# Patient Record
Sex: FEMALE | Race: BLACK OR AFRICAN AMERICAN | Employment: UNEMPLOYED | URBAN - METROPOLITAN AREA
[De-identification: names, ages, dates, MRNs, and addresses within clinical notes are randomized per-mention and may not be internally consistent; named-entity substitution may affect disease eponyms.]

---

## 2017-07-26 ENCOUNTER — HOSPITAL ENCOUNTER (EMERGENCY)
Age: 42
Discharge: HOME | End: 2017-07-26
Attending: EMERGENCY MEDICINE
Payer: COMMERCIAL

## 2017-07-26 VITALS
OXYGEN SATURATION: 100 % | HEART RATE: 84 BPM | DIASTOLIC BLOOD PRESSURE: 90 MMHG | SYSTOLIC BLOOD PRESSURE: 133 MMHG | WEIGHT: 186 LBS | RESPIRATION RATE: 19 BRPM | BODY MASS INDEX: 30.99 KG/M2 | HEIGHT: 65 IN | TEMPERATURE: 98.5 F

## 2017-07-26 DIAGNOSIS — R51.9 HEADACHE, UNSPECIFIED HEADACHE TYPE: Primary | ICD-10-CM

## 2017-07-26 PROCEDURE — 99282 EMERGENCY DEPT VISIT SF MDM: CPT

## 2017-07-26 RX ORDER — ACETAMINOPHEN 500 MG
1000 TABLET ORAL
Status: DISCONTINUED | OUTPATIENT
Start: 2017-07-26 | End: 2017-07-26

## 2017-07-26 NOTE — DISCHARGE INSTRUCTIONS

## 2017-07-26 NOTE — ED NOTES
Patient is refusing tylenol at this time. Patient states \"i can take my own tylenol at home that doesn't cost $300\". Patient instructed on use of tylenol. Will continue to monitor.

## 2017-07-26 NOTE — ED PROVIDER NOTES
HPI Comments: 2:53 AM   Tamy Duffy is a 38 y.o. female with hx of HTN and migraines presents to ED C/O stabbing, intermittent, worsening right sided parietal HA onset 22 hours ago, but woke her up PTA, prompting her to come to the ED. Associated sx include \"blotchyness to my vision. \" Denies current HA in ED. States \"it causes me to be confused\". Also c/o left ear pain and right shoulder pain. Denies exertional sx. Pt denies use of OTC for sx because \"I only have 1 kidney. \" Pt denies falls or trauma, prior episode of similar sx, fevers, chills, nausea, vomiting, or any other sxs or complaints. The history is provided by the patient. No  was used. Past Medical History:   Diagnosis Date    Anxiety     Depression     Hypertension     Personal history of kidney cancer        Past Surgical History:   Procedure Laterality Date    HX COLONOSCOPY      HX HYSTERECTOMY      HX NEPHRECTOMY      HX OOPHORECTOMY Right          History reviewed. No pertinent family history. Social History     Social History    Marital status: SINGLE     Spouse name: N/A    Number of children: N/A    Years of education: N/A     Occupational History    Not on file. Social History Main Topics    Smoking status: Never Smoker    Smokeless tobacco: Not on file    Alcohol use Yes    Drug use: No    Sexual activity: Not on file     Other Topics Concern    Not on file     Social History Narrative         ALLERGIES: Shellfish derived    Review of Systems   Constitutional: Negative. Negative for appetite change, chills and fever. HENT: Negative for congestion, sore throat and trouble swallowing. Eyes: Positive for visual disturbance (\"blotchyness\"). Respiratory: Negative. Negative for cough, shortness of breath and wheezing. Cardiovascular: Negative. Negative for chest pain, palpitations and leg swelling. Gastrointestinal: Negative.   Negative for abdominal pain, blood in stool, diarrhea and vomiting. Genitourinary: Negative. Negative for difficulty urinating, dysuria, frequency and vaginal discharge. Musculoskeletal: Negative. Negative for back pain and myalgias. Skin: Negative. Negative for rash and wound. Neurological: Positive for headaches. Negative for dizziness, syncope, speech difficulty, weakness, light-headedness and numbness. Psychiatric/Behavioral: Negative. Negative for hallucinations, self-injury and suicidal ideas. All other systems reviewed and are negative. Vitals:    07/26/17 0243 07/26/17 0248   BP: 133/90    Pulse: 84    Resp: 19    Temp: 98.5 °F (36.9 °C)    SpO2: 100% 100%   Weight: 84.4 kg (186 lb)    Height: 5' 5\" (1.651 m)             Physical Exam   Constitutional: She is oriented to person, place, and time. She appears well-developed and well-nourished. No distress. HENT:   Head: Normocephalic and atraumatic. Mouth/Throat: Oropharynx is clear and moist.   Eyes: Conjunctivae and EOM are normal. Pupils are equal, round, and reactive to light. No photophobia on exam   Neck: Trachea normal and normal range of motion. Neck supple. No meningeal signs   Cardiovascular: Normal rate, regular rhythm, S1 normal and S2 normal.    Pulmonary/Chest: Effort normal. No accessory muscle usage. No respiratory distress. Abdominal: Soft. Normal appearance. She exhibits no distension. There is no tenderness. There is no rigidity, no rebound and no guarding. Musculoskeletal: Normal range of motion. She exhibits no edema or tenderness. Neurological: She is alert and oriented to person, place, and time. She has normal strength. No cranial nerve deficit or sensory deficit. Coordination normal.   Normal finger to nose, no cerebellar signs or ataxia. Normal gait. Skin: Skin is warm and intact. No rash noted. Psychiatric: Her speech is normal and behavior is normal.   Flat affect   Vitals reviewed.        MDM  Number of Diagnoses or Management Options  Headache, unspecified headache type:   Diagnosis management comments: Shant Anna is a 38 y.o. Female coming in with a right sided parietal intermittent HA only lasting a couple of seconds at a time. ?? Aura and documented hx of migraines. Patient has a very strange demeanor and affect, but is well appearing with a normal neuro exam. Patient drove herself to the ED and is currently asymptomatic. Will provide tylenol and refer to PCP for outpatient follow up. No concern for intracranial hemorrhage, SAH or meningitis. Gave return precautions for worsening symptoms and otherwise instructed to call primary doctor in am for outpatient follow up. ED Course       Procedures  Vitals:  Patient Vitals for the past 12 hrs:   Temp Pulse Resp BP SpO2   07/26/17 0248 - - - - 100 %   07/26/17 0243 98.5 °F (36.9 °C) 84 19 133/90 100 %       Medications Ordered:  Medications   acetaminophen (TYLENOL) tablet 1,000 mg (not administered)       Diagnosis:   1. Headache, unspecified headache type        Disposition: discharge    Follow-up Information     Follow up With Details Comments [address] [place] [address]. Pedrito Maier MD Call today follow up with your primary care physician  [address] [facility]      [id number] [facility] EMERGENCY DEPT  As needed, If symptoms worsen [address] [facility]  [phone]          Patient's Medications   Start Taking    No medications on file   Continue Taking    BUTALBITAL-ACETAMINOPHEN-CAFF (FIORICET) -40 MG PER CAPSULE    Take 1-2 Caps by mouth every six (6) hours as needed for Pain. Max Daily Amount: 8 Caps. HYDROCHLOROTHIAZIDE (HYDRODIURIL) 25 MG TABLET    Take 25 mg by mouth daily. LOSARTAN (COZAAR) 100 MG TABLET    Take 100 mg by mouth daily. SIMVASTATIN (ZOCOR) 10 MG TABLET    Take 20 mg by mouth nightly.    These Medications have changed    No medications on file   Stop Taking    No medications on file Naeem 46  [employer], acting as a scribe for and in the presence of Leatha Dave MD      July 26, 2017 at 2:39 AM       Provider Attestation:      I personally performed the services described in the documentation, reviewed the documentation, as recorded by the scribe in my presence, and it accurately and completely records my words and actions.      Leatha Dave MD      Signed by: Naeem Fajardo, July 26, 2017 at 2:39 AM

## 2018-05-17 ENCOUNTER — CLINICAL SUPPORT (OUTPATIENT)
Dept: FAMILY MEDICINE CLINIC | Age: 43
End: 2018-05-17

## 2018-05-17 DIAGNOSIS — E66.9 OBESITY, UNSPECIFIED CLASSIFICATION, UNSPECIFIED OBESITY TYPE, UNSPECIFIED WHETHER SERIOUS COMORBIDITY PRESENT: Primary | ICD-10-CM

## 2018-05-17 NOTE — PROGRESS NOTES
Patient attended a Medically Supervised Weight Loss New Patient Orientation today where we discussed:  - New Direction Very Low Calorie Diet details  - Medical Supervision  - Nutrition education  - Cost of Meal Replacements  - Policies and compliance required for program enrollment. Patients initial consultation with physician is tentatively scheduled for:  Future Appointments  Date Time Provider Frederic Sanders   5/29/2018 2:30 PM Delvin Marx DO [address] [facility] [address]         Patient was concerned if this would be appropriate for her because she only has 1 kidney.

## 2018-05-17 NOTE — Clinical Note
Patient was concerned if this would be appropriate for her because she only has 1 kidney. Please let me know if I need to cancel her consult. Thank you.

## 2018-05-29 ENCOUNTER — OFFICE VISIT (OUTPATIENT)
Dept: FAMILY MEDICINE CLINIC | Age: 43
End: 2018-05-29

## 2018-05-29 VITALS
DIASTOLIC BLOOD PRESSURE: 87 MMHG | RESPIRATION RATE: 16 BRPM | HEIGHT: 65 IN | HEART RATE: 73 BPM | SYSTOLIC BLOOD PRESSURE: 118 MMHG | BODY MASS INDEX: 31.97 KG/M2 | WEIGHT: 191.9 LBS

## 2018-05-29 DIAGNOSIS — E66.9 OBESITY (BMI 35.0-39.9 WITHOUT COMORBIDITY): Primary | ICD-10-CM

## 2018-05-29 DIAGNOSIS — E78.5 HYPERLIPIDEMIA, UNSPECIFIED HYPERLIPIDEMIA TYPE: ICD-10-CM

## 2018-05-29 DIAGNOSIS — E55.9 VITAMIN D DEFICIENCY: ICD-10-CM

## 2018-05-29 RX ORDER — ZINC GLUCONATE 10 MG
400 LOZENGE ORAL DAILY
COMMUNITY
End: 2022-01-28

## 2018-05-29 RX ORDER — BENZONATATE 100 MG/1
CAPSULE ORAL
COMMUNITY
Start: 2018-05-23 | End: 2021-12-22

## 2018-05-29 RX ORDER — CYANOCOBALAMIN (VITAMIN B-12) 1000 MCG
1 TABLET, EXTENDED RELEASE ORAL
COMMUNITY
End: 2018-08-02 | Stop reason: ALTCHOICE

## 2018-05-29 RX ORDER — CETIRIZINE HCL 10 MG
10 TABLET ORAL
COMMUNITY
End: 2022-04-12

## 2018-05-29 RX ORDER — ALBUTEROL SULFATE 90 UG/1
1-2 AEROSOL, METERED RESPIRATORY (INHALATION)
COMMUNITY
Start: 2018-05-23

## 2018-05-29 RX ORDER — ASCORBIC ACID 500 MG
500 TABLET ORAL DAILY
COMMUNITY
End: 2021-12-02

## 2018-05-29 RX ORDER — BISMUTH SUBSALICYLATE 262 MG
1 TABLET,CHEWABLE ORAL DAILY
COMMUNITY

## 2018-05-29 NOTE — PATIENT INSTRUCTIONS
Homework for [employer]        Exercise    Exercise daily   - Start slow, gradually increase your time by around 10 to 20 % a week    - To prevent injury, take a recovery week every 4 weeks (reduce your exercise time and intensity by 1/2 during this time)    - Your goal is to work up to 150 min a week; hard enough that you can't whistle or sing. It may take 6 months to work up to this. - Call the Health  at [facility] labs for exercise ideas ([phone])          Common Side Effects   (In order of how common)    -Fatigue  -Hunger  -Constipation  -Low sodium  -Hair Loss      1. Fatigue  Everyone goes through this stage  It's normal  It lasts 10 - 14 days  It goes away  It's your body adjusting to the Ketogenic diet and it's normal       2. Hunger  More common in the first few days  After your body adjusts to the Ketogenic diet it will go away       3. Constipation   -Drink at least 64 oz of non-calorie fluid a day  -Add fiber (at least 20 grams a day)       1. Get the New Direction products with fiber added     2. Use SUGAR-FREE products: Fiber One products, Benefiber or Metamucil    If you're prone to constipation: Take a Stool Softener every day.     (eg - Dulcolax Stool Softener 100 mg or Miralax)    If you get constipated:     1. Start with a Stool Softener (produces a bowel movement in 72 hr):   Examples:      Miralax 1 capful twice a day (It's OK to go up to 3 caps for a few days)      Dulcolax Stool Softener 100 mg       2. Next: If you still have constipation after 2 or 3 days, add a Stimulant          Laxative (this will produce a bowel movement in 6 - 12 hr):   Examples:      Exlax (1 small square) for up to 4 days      Dulcolax stimulant laxative (8.25 mg)       Magnesium citrate pill 500 mg 3 - 4 pills a day       3. Or you can go straight to a combination Stool Softener / Stimulant at night. If  you need, you can add a morning dose.    Examples:      Pericolace 50 mg / 8.25 mg      Senokot-S  50 mg / 8.25 mg       4. Finally If You're Really locked up, get Liquid Magnesium Citrate (take  according to the directions)      4. Low blood levels of sodium  -Not very common, especially if you're not taking a diuretic (fluid pill)  If you develop a headache, feel fatigued, light-headed or dizzy    Drink 1/2 cup of bouillon broth up to twice a day until you feel normal      5. Hair loss  -This is fairly uncommon; you may see more than a usual amount of hair in your brush or the shower drain  This can happen with any physical stress (surgery, trauma or calorie restriction) or psychological stress. Although is it very concerning, it is often temporary and will resolve within 3 months. Some people notice a benefit from taking a daily dose of Biotin 2,500 mcg and increasing their Fish Oil intake. Books to [address] [place] [address]    1. Change Anything: The [employer] of Personal Success  by Dandy Graham, Ruba Rice, and Gian Alfaro    2. Mindless Eating  by Lou Freeman    3. Perfectly Yourself  by Anderson Tellez    4. Me, Myself and I - 28 Days to Creative Self-Love  by Thierno Dunn version only    Note: Reading these books is a small but significant investment in yourself. Merely following the diet will reliably result in weight loss. However, revising how you respond to stressful situations, how you relate to food and, your commitment to self-care (adequate sleep, exercise & daily reflection) is the ONLY way to protect your weight loss and free yourself from your patterns that lead to weight gain. Compliance    We do not expect perfection but we do ask for your persistence. Your success is directly corolleated to your willingness to do the work of growing yourself. You will hit plateaus in your weight loss. You will run into situations that test your will power. You will encounter times when you feel frustrated.     It's OK if you slip up from time to time. However, how your respond to your slip ups and, the adjustments you make to prevent future slip ups will determine you long-term success. If you find yourself temporarily slipping in the program, it's OK. We will gently nudge you forward and encourage you to do the work of identifying and moving beyond your stuck areas. However, if you find yourself wavering in the program for a prolonged time (more than 3 or 4 months) we will ask that you take a break from the program and work with a counselor to do some focused work in order to identify and break the patterns that are holding you back. Once you and your counselor is convinced that you have moved past those patterns and want to give the program another chance, we will gladly work with you to determine if you're ready to start back in the program.    When returning after a break, if it's been less than 3 months, you do not need to repeat an orientation, labs or an EKG. If it's over been 3 months you will required to repeat an orientation and, if greater than 6 months, you will be required to repeat an orientation, labs and an EKG. Additional Tips    - Consume your 4 daily meal replacements equally spaced over the    day   No more than 6 hours between each meal   Breakfast is especially important    - Get the support of family and friends    - Snack-proof your house    - Have strategies for social situations, meetings that run over or vacation      - When you fall off the plan it's no big deal; just start right back. Turn those days into examples of what to change or avoid next time. Long-term Healthy Weight / Body Fat  Different ways to determining your ideal weight:  1. Keep your waist to less than 1/2 of your height   2. Keep your % body fat to under 30% for female and under 20% if male  3. Keep your BMI around 25          Supplements      1.   Vitacost Synergy Basic Multi-Vitamin (Their In-House Brand)      Take 1 capsule twice a day        Found ONLY at [facility], NOT at [facility], [facility], [facility], the Vitamin Shoppe, etc      SKU #: [id number]       $16.49   / 1 month supply      www. Heap  [phone]       2. N-Acetyl Cysteine (NAC) -- 600 mg       1 pill once a day       Found at many stores but [address] has a good price:       Item #: [id number]  $9.99 / 120 pills (4 month supply)       www. Heap  [phone]      3. Turmeric with Black Pepper Extract (or Bioperine) 500 mg      1 pill 1 - 2 times a day (more is joint pain or increased inflammation)      Found at many stores but Vitacost has a good price:      SKU #: [id number]  $13.64 / 60 pills      www[employer]  [phone]       4. Probiotic: 15-35 (35 billion CFU)         1 capsule twice a day for 2 weeks, then 3 days a week       SKU #: [id number]  $27.99 / 120 capsules       www[employer]  [phone]       5. Fish Oil      Take 1 capsule daily                             FYI:   Typical fish oil per pill =  mg and  mg  Krill oil per pill = EPA 50 - 70 mg and DHA 27 - 250 mg    6. Other things to help with will power      -[address] you with self acceptance           Put 4 drops in 10 oz water or a meal replacement 2 - 4 times a day         Around $15 a vial which lasts ~3 months         www. Heap  [phone]                Drink Filtered Water    My favorite water filter (adds minerals to your water and cheaper than Jennifer)    pH REFRESH Alkaline Water Pitcher Ionizer With 2 Long-Life Filters - Alkaline  Machine - Ionized Water Alkalizer Filter, 84oz, 2.5L (2017 Model) (White)   Sold on [employer] w/ Free Shipping        ^^^^^^^^^^^^^^^^^^^^^^^^^^^^^^^^^^^^^^^^^^^^^^^^^^^^^^^^^^^^^^^^^^^^^^^^^^^^^^^^^^^^^^^^^^^^^^^      Carbohydrates     If you do not metabolize carbohydrates well, you will lose weight and keep the weigh off by keeping your carbohydrate intake low.  How do you know if you do not metabolize carbs well? If your Triglycerides, sdLCL-C and Insulin Resistance are elevated, then you will do well to follow a low carb diet. Fun Facts About Carbs    - The Typical American Diet = 450 grams a day    - The Reducing Phase of the VLCD = 40 grams a day    - Target for weight loss maintenance:   - Eat no more than 30 grams per meal   - Eat no more than 10 grams per snack (mid-morning and mid-afternoon snack)        Resources for finding out where carbs hide in our foods:  1. Our Registered Dietitians    2. Www. Atkins. com/tools    3. Read food labels    4. Books       - The Calorie Wilhelminia Bless       - The [place] System Diet for a New You       - Radha Olsen's NEW Carb and Calorie 4th Edition (my favorite)    5. Smart phone and Internet-based apps       - Circlefive        - Carb Manager      If you want to get a better picture of your cardiac risk, consider getting a coronary calcium score:  Call [phone] to schedule one. Body Mass Index: Care Instructions  Your Care Instructions    Body mass index (BMI) can help you see if your weight is raising your risk for health problems. It uses a formula to compare how much you weigh with how tall you are. · A BMI lower than 18.5 is considered underweight. · A BMI between 18.5 and 24.9 is considered healthy. · A BMI between 25 and 29.9 is considered overweight. A BMI of 30 or higher is considered obese. If your BMI is in the normal range, it means that you have a lower risk for weight-related health problems. If your BMI is in the overweight or obese range, you may be at increased risk for weight-related health problems, such as high blood pressure, heart disease, stroke, arthritis or joint pain, and diabetes.  If your BMI is in the underweight range, you may be at increased risk for health problems such as fatigue, lower protection (immunity) against illness, muscle loss, bone loss, hair loss, and hormone problems. BMI is just one measure of your risk for weight-related health problems. You may be at higher risk for health problems if you are not active, you eat an unhealthy diet, or you drink too much alcohol or use tobacco products. Follow-up care is a key part of your treatment and safety. Be sure to make and go to all appointments, and call your doctor if you are having problems. It's also a good idea to know your test results and keep a list of the medicines you take. How can you care for yourself at home? · Practice healthy eating habits. This includes eating plenty of fruits, vegetables, whole grains, lean protein, and low-fat dairy. · If your doctor recommends it, get more exercise. Walking is a good choice. Bit by bit, increase the amount you walk every day. Try for at least 30 minutes on most days of the week. · Do not smoke. Smoking can increase your risk for health problems. If you need help quitting, talk to your doctor about stop-smoking programs and medicines. These can increase your chances of quitting for good. · Limit alcohol to 2 drinks a day for men and 1 drink a day for women. Too much alcohol can cause health problems. If you have a BMI higher than 25  · Your doctor may do other tests to check your risk for weight-related health problems. This may include measuring the distance around your waist. A waist measurement of more than 40 inches in men or 35 inches in women can increase the risk of weight-related health problems. · Talk with your doctor about steps you can take to stay healthy or improve your health. You may need to make lifestyle changes to lose weight and stay healthy, such as changing your diet and getting regular exercise. If you have a BMI lower than 18.5  · Your doctor may do other tests to check your risk for health problems. · Talk with your doctor about steps you can take to stay healthy or improve your health.  You may need to make lifestyle changes to gain or maintain weight and stay healthy, such as getting more healthy foods in your diet and doing exercises to build muscle. Where can you learn more? Go to http://elvie-yi.info/. Enter S176 in the search box to learn more about \"Body Mass Index: Care Instructions. \"  Current as of: October 13, 2016  Content Version: 11.4  © 0998-8118 [employer]. Care instructions adapted under license by [employer] (which disclaims liability or warranty for this information). If you have questions about a medical condition or this instruction, always ask your healthcare professional. [place] [address] any warranty or liability for your use of this information.

## 2018-05-29 NOTE — PROGRESS NOTES
[facility] Weight Loss Program Progress Note: Initial Physician Visit     Chano Purdy is a 37 y.o. female who is here for medical screening for entering the [facility] Program.     CC:  Obesity      Weight History  I personally reviewed the Medical Screening Nara Fothergill completed by patient and scanned into media section of chart. It includes duration of their obesity, maximum weight, goal weight and weight gain time line (timing), all of which give the context of their obesity AND a Family History of their obesity. Is their Weight Loss Goal entered in to Comments? 145      Weight loss History  I personally reviewed the Medical Screening Nara Fothergill completed by the patient and scanned into media section of chart. It includes number of weight loss attempts, the weight loss program that patients was most successful using, and if they have any hx of anorectic medication use, including OTC supplements. This captures modifying factors. Significant Medical History  Past Medical History:   Diagnosis Date    Anxiety     Chronic kidney disease     Stage II    Depression     Hypertension     Personal history of kidney cancer        I personally reviewed the Medical Screening Nara Fothergill completed by the patient and scanned into media section of chart. This allows me to assess associated symptoms that are significant in the assessment of the patient's obesity and the patient's Past Medical History. Significant Psychosocial History   Has a doctor every diagnosed with Binge Eating Disorder, Bulemia or Anorexia? : no     Compliance  Upcoming Travel? no    Social History  Social History   Substance Use Topics    Smoking status: Never Smoker    Smokeless tobacco: Never Used    Alcohol use Yes       Exercise  I personally reviewed the Medical Screening Nara Fothergill completed by the patient and scanned into media section of chart.       Review of Systems  See HPI        Objective  Visit Vitals    /87    Pulse 73    Resp 16    Ht 5' 5\" (1.651 m)    Wt 191 lb 14.4 oz (87 kg)    BMI 31.93 kg/m2         Weight Metrics 5/29/2018 5/29/2018 7/26/2017 12/9/2016 11/11/2016 2/11/2016 10/20/2015   Weight - 191 lb 14.4 oz 186 lb 185 lb 187 lb 190 lb 190 lb   Waist Measure Inches 34 - - - - - -   Body Fat % 39.7 - - - - - -   BMI - 31.93 kg/m2 30.95 kg/m2 36.13 kg/m2 36.52 kg/m2 37.11 kg/m2 37.11 kg/m2       Labs: See HDL labs scanned into Media section       Physical Exam    Vital Signs Reviewed  Weight Management Metrics Reviewed    Appearance: Obese  HEENT:  Scleral icterus?  no  Neck:  Thyromegaly or nodules? no  Mouth: Large tongue no  Heart:  RRR  Lungs:  LCTA  Abdomen:     Hepatomegaly? no   Striae present? no  Skin:    Acne?  no   Hirsutism? no   Skin tags? no   Acanthosis Nigricans?  no  Ext:  Edema? no      Assessment & Plan  Encounter Diagnoses   Name Primary?  Obesity (BMI 35.0-39.9 without comorbidity) Yes    Hyperlipidemia, unspecified hyperlipidemia type     Vitamin D deficiency        1. Labs reviewed w/ patient    2. EKG reviewed w/ patient:  Personally reviewed by me  -Sinus  Rhythm   -Left atrial enlargement.   -Negative precordial T-waves  -Probably normal -consider anteroseptal ischemia - asymptomatic  QTc = 411 sec (Upper limit is 440 for males & 460 for females)    3. Medication changes include: Stop HCTZ & stop Fe    4. Based on his history, labs and EKG, Pete Quiñonez is now enrolled for the [facility] Weight Loss Program.     5.  Individualized Patient Plan is as follows:   Diet Regimen: 4 Meal Replacements daily. Weigh in: weekly at [address]   BP f/up plan: weekly at [address]       25 min of the 45 minutes face to face time with Jennifer Bautista consisted of counseling & coordinating and/or discussing treatment plans in reference to her above mentioned diagnoses.

## 2018-05-29 NOTE — MR AVS SNAPSHOT
Pam Diaz [place] [address] [facility] [address]. [address] Located within [facility] 83 [id number] 
[phone] Patient: Krysten Vaughan MRN: [id number] UVB:[birth year]52 Visit Information Date & Time Provider Department Dept. Phone Encounter #  
 5/29/2018  2:30 PM Garret AriadneEllen landers 13 [id number] Follow-up Instructions Return in about 4 weeks (around 6/26/2018) for MSWL & Lab Draw. Upcoming Health Maintenance Date Due DTaP/Tdap/Td series (1 - Tdap) 2/14/1994 PAP AKA CERVICAL CYTOLOGY 2/14/1994 Influenza Age 5 to Adult 8/1/2018 Allergies as of 5/29/2018  Review Complete On: 5/29/2018 By: Sascha Mcgrath LPN Severity Noted Reaction Type Reactions Shellfish Derived  08/27/2014    Angioedema Current Immunizations  Never Reviewed No immunizations on file. Not reviewed this visit You Were Diagnosed With   
  
 Codes Comments Obesity (BMI 35.0-39.9 without comorbidity)    -  Primary ICD-10-CM: W60.1 ICD-9-CM: 278.00 Hyperlipidemia, unspecified hyperlipidemia type     ICD-10-CM: E78.5 ICD-9-CM: 272.4 Vitamin D deficiency     ICD-10-CM: E55.9 ICD-9-CM: 268.9 Vitals BP Pulse Resp Height(growth percentile) Weight(growth percentile) BMI  
 118/87 73 16 5' 5\" (1.651 m) 191 lb 14.4 oz (87 kg) 31.93 kg/m2 OB Status Smoking Status Hysterectomy Never Smoker Vitals History BMI and BSA Data Body Mass Index Body Surface Area  
 31.93 kg/m 2 2 m 2 Your Updated Medication List  
  
   
This list is accurate as of 5/29/18  2:49 PM.  Always use your most recent med list.  
  
  
  
  
 albuterol 90 mcg/actuation inhaler Commonly known as:  PROVENTIL HFA, VENTOLIN HFA, PROAIR HFA  
1-2 Puffs. benzonatate 100 mg capsule Commonly known as:  TESSALON Take 1 capsule 3 times daily as needed for coughing, swallow capsules whole. butalbital-acetaminophen-caff -40 mg per capsule Commonly known as:  [employer] Take 1-2 Caps by mouth every six (6) hours as needed for Pain. Max Daily Amount: 8 Caps. cetirizine 10 mg tablet Commonly known as:  ZYRTEC 10 mg.  
  
 COQ10  PO Take  by mouth. CULTURELLE 15 billion cell capsule Generic drug:  lactobacillus rhamnosus gg 15 billion cell Take 1 Cap by mouth daily. EMERGEN-C 1,000 mg Pwep Generic drug:  Ascorbic Acid-Multivits-Min Take  by mouth. FIBER (PSYLLIUM HUSK) PO Take  by mouth. fish oil-omega-3 fatty acids 340-1,000 mg capsule Take 1 Cap by mouth daily. hydroCHLOROthiazide 25 mg tablet Commonly known as:  HYDRODIURIL Take 25 mg by mouth daily. iron, carbonyl 45 mg Tab Commonly known as:  Nevelyn Cohen Take 1 Tab by mouth every seven (7) days. losartan 100 mg tablet Commonly known as:  COZAAR Take 100 mg by mouth daily. magnesium 250 mg Tab Take  by mouth.  
  
 multivitamin tablet Commonly known as:  ONE A DAY Take 1 Tab by mouth daily. [address] Take  by mouth.  
  
 potassium 99 mg tablet Take 99 mg by mouth two (2) times a day. simvastatin 10 mg tablet Commonly known as:  ZOCOR Take 20 mg by mouth nightly. VITAMIN B-6 PO Take  by mouth. VITAMIN C 500 mg tablet Generic drug:  ascorbic acid (vitamin C) Take  by mouth. zinc 50 mg Tab tablet Take  by mouth daily. We Performed the Following AMB POC EKG ROUTINE W/ 12 LEADS, INTER & REP [81438 CPT(R)] Follow-up Instructions Return in about 4 weeks (around 6/26/2018) for MS[facility] & Lab Draw. Patient Instructions Homework for [employer] 
 
 
 
Exercise Exercise daily  
- Start slow, gradually increase your time by around 10 to 20 % a week - To prevent injury, take a recovery week every 4 weeks (reduce your exercise time and intensity by 1/2 during this time) - Your goal is to work up to 150 min a week; hard enough that you can't whistle or sing. It may take 6 months to work up to this. - Call the Health  at [facility] labs for exercise ideas ([phone]) Common Side Effects (In order of how common) -Fatigue 
-Hunger 
-Constipation 
-Low sodium 
-Hair Loss 1. Fatigue Everyone goes through this stage It's normal 
It lasts 10 - 14 days It goes away It's your body adjusting to the Ketogenic diet and it's normal  
 
 
2. Hunger More common in the first few days After your body adjusts to the Ketogenic diet it will go away 3. Constipation  
-Drink at least 64 oz of non-calorie fluid a day 
-Add fiber (at least 20 grams a day) 1. Get the New Direction products with fiber added 2. Use SUGAR-FREE products: Fiber One products, Benefiber or Metamucil If you're prone to constipation: Take a Stool Softener every day. 
   (eg - Dulcolax Stool Softener 100 mg or Miralax) If you get constipated: 1. Start with a Stool Softener (produces a bowel movement in 72 hr): 
 Examples: 
    Miralax 1 capful twice a day (It's OK to go up to 3 caps for a few days) Dulcolax Stool Softener 100 mg 2. Next: If you still have constipation after 2 or 3 days, add a Stimulant Laxative (this will produce a bowel movement in 6 - 12 hr): 
 Examples: 
    Exlax (1 small square) for up to 4 days Dulcolax stimulant laxative (8.25 mg) Magnesium citrate pill 500 mg 3 - 4 pills a day 3. Or you can go straight to a combination Stool Softener / Stimulant at night. If  you need, you can add a morning dose. Examples: 
    Pericolace 50 mg / 8.25 mg Senokot-S  50 mg / 8.25 mg 
 
   4. Finally If You're Really locked up, get Liquid Magnesium Citrate (take  according to the directions) 4. Low blood levels of sodium -Not very common, especially if you're not taking a diuretic (fluid pill) If you develop a headache, feel fatigued, light-headed or dizzy Drink 1/2 cup of bouillon broth up to twice a day until you feel normal 
 
 
5. Hair loss 
-This is fairly uncommon; you may see more than a usual amount of hair in your brush or the shower drain This can happen with any physical stress (surgery, trauma or calorie restriction) or psychological stress. Although is it very concerning, it is often temporary and will resolve within 3 months. Some people notice a benefit from taking a daily dose of Biotin 2,500 mcg and increasing their Fish Oil intake. Books to [address] [place] [address] 1. Change Anything: The [employer] of Personal Success 
by Daphnie Frazier, Bert Desir, and Sidra Donald 2. Mindless Eating 
by Ava Olivares 3. Perfectly Yourself 
by Baljinder Cordero 4. Me, Myself and I - 28 Days to Creative Self-Love 
by Loni Roldan version only Note: Reading these books is a small but significant investment in yourself. Merely following the diet will reliably result in weight loss. However, revising how you respond to stressful situations, how you relate to food and, your commitment to self-care (adequate sleep, exercise & daily reflection) is the ONLY way to protect your weight loss and free yourself from your patterns that lead to weight gain. Compliance We do not expect perfection but we do ask for your persistence. Your success is directly corolleated to your willingness to do the work of growing yourself. You will hit plateaus in your weight loss. You will run into situations that test your will power. You will encounter times when you feel frustrated. It's OK if you slip up from time to time. However, how your respond to your slip ups and, the adjustments you make to prevent future slip ups will determine you long-term success. If you find yourself temporarily slipping in the program, it's OK. We will gently nudge you forward and encourage you to do the work of identifying and moving beyond your stuck areas. However, if you find yourself wavering in the program for a prolonged time (more than 3 or 4 months) we will ask that you take a break from the program and work with a counselor to do some focused work in order to identify and break the patterns that are holding you back. Once you and your counselor is convinced that you have moved past those patterns and want to give the program another chance, we will gladly work with you to determine if you're ready to start back in the program. 
 
When returning after a break, if it's been less than 3 months, you do not need to repeat an orientation, labs or an EKG. If it's over been 3 months you will required to repeat an orientation and, if greater than 6 months, you will be required to repeat an orientation, labs and an EKG. Additional Tips 
 
- Consume your 4 daily meal replacements equally spaced over the    day No more than 6 hours between each meal 
 Breakfast is especially important - Get the support of family and friends 
 
- Snack-proof your house - Have strategies for social situations, meetings that run over or vacation - When you fall off the plan it's no big deal; just start right back. Turn those days into examples of what to change or avoid next time. Long-term Healthy Weight / Body Fat Different ways to determining your ideal weight: 
1. Keep your waist to less than 1/2 of your height 2. Keep your % body fat to under 30% for female and under 20% if male 3. Keep your BMI around 25 Supplements 1. Vitacost Synergy Basic Multi-Vitamin (Their In-House Brand) Take 1 capsule twice a day Found ONLY at [facility], NOT at [facility], [address] [place] [address], the [address] [place] [address] kelly Hernandez 
    [facility] #: [id number] $16.49   / 1 month supply 
    www. Zannel  [phone]  
 
 
2. N-Acetyl Cysteine (NAC) -- 600 mg 
     1 pill once a day Found at many stores but [address] has a good price: 
     Item #: [id number]  $9.99 / 120 pills (4 month supply) 
     www. Zannel  [phone] 3. Turmeric with Black Pepper Extract (or Bioperine) 500 mg 
    1 pill 1 - 2 times a day (more is joint pain or increased inflammation) Found at many stores but [address] has a good price: 
    SKU #: [id number]  $13.64 / 61 pills 
    www. Zannel  [phone] 4. Probiotic: 15-35 (35 billion CFU) 1 capsule twice a day for 2 weeks, then 3 days a week SKU #: [id number]  $27.99 / 120 capsules 
     www. Zannel  [phone] 5. Fish Oil Take 1 capsule daily FYI:  
Typical fish oil per pill =  mg and  mg 
Krill oil per pill = EPA 50 - 70 mg and DHA 27 - 250 mg 
 
6. Other things to help with will power -Radha Ke Remed[employer] Crab Apple - Helps you with self acceptance Put 4 drops in 10 oz water or a meal replacement 2 - 4 times a day Around $15 a vial which lasts ~3 months 
       www. Zannel  [phone] Drink Filtered Water My favorite water filter (adds minerals to your water and cheaper than Jennifer) pH REFRESH Alkaline Water Pitcher Ionizer With 2 Long-Life Filters - Alkaline  Machine - Ionized Water Alkalizer Filter, 84oz, 2.5L (2017 Model) Jackson Lauraside) Sold on [employer] w/ Free Shipping 
 
 
 
^^^^^^^^^^^^^^^^^^^^^^^^^^^^^^^^^^^^^^^^^^^^^^^^^^^^^^^^^^^^^^^^^^^^^^^^^^^^^^^^^^^^^^^^^^^^^^^ Carbohydrates If you do not metabolize carbohydrates well, you will lose weight and keep the weigh off by keeping your carbohydrate intake low. How do you know if you do not metabolize carbs well?   If your Triglycerides, sdLCL-C and Insulin Resistance are elevated, then you will do well to follow a low carb diet. Fun Facts About Carbs - The Typical American Diet = 450 grams a day - The Reducing Phase of the VLCD = 40 grams a day - Target for weight loss maintenance: 
 - Eat no more than 30 grams per meal 
 - Eat no more than 10 grams per snack (mid-morning and mid-afternoon snack) Resources for finding out where carbs hide in our foods: 
1. Our Registered Dietitians 2. Www. Atkins. com/tools 3. Read food labels 4. Books - The Calorie Annamarie Dukesile - The New Atkins Diet for a New You 
     - Radha Joneslloyd's NEW Carb and Calorie 4th Edition (my favorite) 5. Smart phone and Internet-based apps - Cyphort  
     - Carb Manager If you want to get a better picture of your cardiac risk, consider getting a coronary calcium score:  Call [phone] to schedule one. Body Mass Index: Care Instructions Your Care Instructions Body mass index (BMI) can help you see if your weight is raising your risk for health problems. It uses a formula to compare how much you weigh with how tall you are. · A BMI lower than 18.5 is considered underweight. · A BMI between 18.5 and 24.9 is considered healthy. · A BMI between 25 and 29.9 is considered overweight. A BMI of 30 or higher is considered obese. If your BMI is in the normal range, it means that you have a lower risk for weight-related health problems. If your BMI is in the overweight or obese range, you may be at increased risk for weight-related health problems, such as high blood pressure, heart disease, stroke, arthritis or joint pain, and diabetes. If your BMI is in the underweight range, you may be at increased risk for health problems such as fatigue, lower protection (immunity) against illness, muscle loss, bone loss, hair loss, and hormone problems. BMI is just one measure of your risk for weight-related health problems. You may be at higher risk for health problems if you are not active, you eat an unhealthy diet, or you drink too much alcohol or use tobacco products. Follow-up care is a key part of your treatment and safety. Be sure to make and go to all appointments, and call your doctor if you are having problems. It's also a good idea to know your test results and keep a list of the medicines you take. How can you care for yourself at home? · Practice healthy eating habits. This includes eating plenty of fruits, vegetables, whole grains, lean protein, and low-fat dairy. · If your doctor recommends it, get more exercise. Walking is a good choice. Bit by bit, increase the amount you walk every day. Try for at least 30 minutes on most days of the week. · Do not smoke. Smoking can increase your risk for health problems. If you need help quitting, talk to your doctor about stop-smoking programs and medicines. These can increase your chances of quitting for good. · Limit alcohol to 2 drinks a day for men and 1 drink a day for women. Too much alcohol can cause health problems. If you have a BMI higher than 25 · Your doctor may do other tests to check your risk for weight-related health problems. This may include measuring the distance around your waist. A waist measurement of more than 40 inches in men or 35 inches in women can increase the risk of weight-related health problems. · Talk with your doctor about steps you can take to stay healthy or improve your health. You may need to make lifestyle changes to lose weight and stay healthy, such as changing your diet and getting regular exercise. If you have a BMI lower than 18.5 · Your doctor may do other tests to check your risk for health problems. · Talk with your doctor about steps you can take to stay healthy or improve your health.  You may need to make lifestyle changes to gain or maintain weight and stay healthy, such as getting more healthy foods in your diet and doing exercises to build muscle. Where can you learn more? Go to http://elvie-yi.info/. Enter S176 in the search box to learn more about \"Body Mass Index: Care Instructions. \" Current as of: October 13, 2016 Content Version: 11.4 © 9975-6297 [employer]. Care instructions adapted under license by [employer] (which disclaims liability or warranty for this information). If you have questions about a medical condition or this instruction, always ask your healthcare professional. [address] any warranty or liability for your use of this information. Introducing [facility] & HE[facility] SERVICES! [employer] introduces [employer] patient portal. Now you can access parts of your medical record, email your doctor's office, and request medication refills online. 1. In your internet browser, go to [email or web address] 2. Click on the First Time User? Click Here link in the Sign In box. You will see the New Member Sign Up page. 3. Enter your [employer] Access Code exactly as it appears below. You will not need to use this code after youve completed the sign-up process. If you do not sign up before the expiration date, you must request a new code. · [employer] Access Code: [email or web address] Expires: 8/27/2018  2:48 PM 
 
4. Enter the last four digits of your Social Security Number (xxxx) and Date of Birth (mm/dd/yyyy) as indicated and click Submit. You will be taken to the next sign-up page. 5. Create a [employer] ID. This will be your [employer] login ID and cannot be changed, so think of one that is secure and easy to remember. 6. Create a [employer] password. You can change your password at any time. 7. Enter your Password Reset Question and Answer. This can be used at a later time if you forget your password. 8. Enter your e-mail address. You will receive e-mail notification when new information is available in [address] 9. Click Sign Up. You can now view and download portions of your medical record. 10. Click the Download Summary menu link to download a portable copy of your medical information. If you have questions, please visit the Frequently Asked Questions section of the [employer] website. Remember, [employer] is NOT to be used for urgent needs. For medical emergencies, dial 911. Now available from your iPhone and Android! Please provide this summary of care documentation to your next provider. Your primary care clinician is listed as ABUNDIO GUEVARA. If you have any questions after today's visit, please call [phone].

## 2018-05-30 ENCOUNTER — TELEPHONE (OUTPATIENT)
Dept: FAMILY MEDICINE CLINIC | Age: 43
End: 2018-05-30

## 2018-05-30 NOTE — TELEPHONE ENCOUNTER
Patient called this morning and was confused about her labs and what was reviewed at her consult yesterday, 5/29/18. Patient had questions about whether she should be consuming the 4 meal replacements daily as prescribed for the VLCD (108 grams of protein) given she only has 1 kidney. The provider note was incomplete at the time of this call and I was unable to reference it to answer her in regards to what instructions were given to her by Dr. Neptali Vo. Patient is requesting a followup call to address these clinical concerns.

## 2018-06-07 ENCOUNTER — CLINICAL SUPPORT (OUTPATIENT)
Dept: FAMILY MEDICINE CLINIC | Age: 43
End: 2018-06-07

## 2018-06-07 VITALS
BODY MASS INDEX: 34.72 KG/M2 | DIASTOLIC BLOOD PRESSURE: 82 MMHG | HEART RATE: 72 BPM | RESPIRATION RATE: 16 BRPM | SYSTOLIC BLOOD PRESSURE: 115 MMHG | WEIGHT: 188.7 LBS | HEIGHT: 62 IN

## 2018-06-07 DIAGNOSIS — E66.9 OBESITY (BMI 35.0-39.9 WITHOUT COMORBIDITY): Primary | ICD-10-CM

## 2018-06-07 NOTE — PROGRESS NOTES
Progress Note: Weekly Education Class in the [facility] Loss Program   Is there anything that you or the patient needs to let Dr Kim Rodriguez know about? no  Over the past week, have you experienced any side-effects? Yes fatigue and constipation    Guera Sanchez is a 37 y.o. female who is enrolled in [facility]    Visit Vitals    Ht 5' 5\" (1.651 m)     Weight Metrics 5/29/2018 5/29/2018 7/26/2017 12/9/2016 11/11/2016 2/11/2016 10/20/2015   Weight - 191 lb 14.4 oz 186 lb 185 lb 187 lb 190 lb 190 lb   Waist Measure Inches 34 - - - - - -   Body Fat % 39.7 - - - - - -   BMI - 31.93 kg/m2 30.95 kg/m2 36.13 kg/m2 36.52 kg/m2 37.11 kg/m2 37.11 kg/m2         Have you received any other medical care this week? no  If yes, where and for what? Have you had any change in your medications since your last visit? no  If yes what? Did you have any problems adhering to the program last week? no  If yes, please explain:       Eating Habits Over Last Week:  Did you take in 64 oz of non-caloric fluids?  yes     Did you consume your 4 meal replacements each day? no sometimes only 2-3      Physical Activity Over the Past Week:    Aerobic exercise: 300+ min  Resistance exercise: 0 workouts / week

## 2018-06-14 ENCOUNTER — CLINICAL SUPPORT (OUTPATIENT)
Dept: FAMILY MEDICINE CLINIC | Age: 43
End: 2018-06-14

## 2018-06-14 ENCOUNTER — HOSPITAL ENCOUNTER (EMERGENCY)
Age: 43
Discharge: HOME | End: 2018-06-14
Attending: EMERGENCY MEDICINE
Payer: COMMERCIAL

## 2018-06-14 VITALS
DIASTOLIC BLOOD PRESSURE: 77 MMHG | HEART RATE: 64 BPM | SYSTOLIC BLOOD PRESSURE: 117 MMHG | WEIGHT: 188.7 LBS | BODY MASS INDEX: 34.72 KG/M2 | RESPIRATION RATE: 16 BRPM | HEIGHT: 62 IN

## 2018-06-14 VITALS
DIASTOLIC BLOOD PRESSURE: 67 MMHG | HEART RATE: 60 BPM | SYSTOLIC BLOOD PRESSURE: 121 MMHG | OXYGEN SATURATION: 100 % | BODY MASS INDEX: 33.86 KG/M2 | TEMPERATURE: 97.5 F | HEIGHT: 62 IN | WEIGHT: 184 LBS | RESPIRATION RATE: 12 BRPM

## 2018-06-14 DIAGNOSIS — R00.2 PALPITATIONS: Primary | ICD-10-CM

## 2018-06-14 DIAGNOSIS — E66.9 OBESITY (BMI 35.0-39.9 WITHOUT COMORBIDITY): Primary | ICD-10-CM

## 2018-06-14 DIAGNOSIS — E87.6 HYPOKALEMIA: ICD-10-CM

## 2018-06-14 LAB
ANION GAP SERPL CALC-SCNC: 5 MMOL/L (ref 3–18)
APPEARANCE UR: CLEAR
ATRIAL RATE: 60 BPM
BASOPHILS # BLD: 0 K/UL (ref 0–0.06)
BASOPHILS NFR BLD: 0 % (ref 0–2)
BILIRUB UR QL: NEGATIVE
BUN SERPL-MCNC: 15 MG/DL (ref 7–18)
BUN/CREAT SERPL: 14 (ref 12–20)
CALCIUM SERPL-MCNC: 9 MG/DL (ref 8.5–10.1)
CALCULATED P AXIS, ECG09: 65 DEGREES
CALCULATED R AXIS, ECG10: 42 DEGREES
CALCULATED T AXIS, ECG11: 60 DEGREES
CHLORIDE SERPL-SCNC: 99 MMOL/L (ref 100–108)
CK MB CFR SERPL CALC: 1.2 % (ref 0–4)
CK MB SERPL-MCNC: 3 NG/ML (ref 5–25)
CK SERPL-CCNC: 248 U/L (ref 26–192)
CO2 SERPL-SCNC: 30 MMOL/L (ref 21–32)
COLOR UR: YELLOW
CREAT SERPL-MCNC: 1.07 MG/DL (ref 0.6–1.3)
D DIMER PPP FEU-MCNC: 0.27 UG/ML(FEU)
DIAGNOSIS, 93000: NORMAL
DIFFERENTIAL METHOD BLD: ABNORMAL
EOSINOPHIL # BLD: 0.1 K/UL (ref 0–0.4)
EOSINOPHIL NFR BLD: 2 % (ref 0–5)
ERYTHROCYTE [DISTWIDTH] IN BLOOD BY AUTOMATED COUNT: 12.7 % (ref 11.6–14.5)
GLUCOSE SERPL-MCNC: 83 MG/DL (ref 74–99)
GLUCOSE UR STRIP.AUTO-MCNC: NEGATIVE MG/DL
HCT VFR BLD AUTO: 33.5 % (ref 35–45)
HGB BLD-MCNC: 11.2 G/DL (ref 12–16)
HGB UR QL STRIP: NEGATIVE
KETONES UR QL STRIP.AUTO: ABNORMAL MG/DL
LEUKOCYTE ESTERASE UR QL STRIP.AUTO: NEGATIVE
LYMPHOCYTES # BLD: 1.9 K/UL (ref 0.9–3.6)
LYMPHOCYTES NFR BLD: 40 % (ref 21–52)
MCH RBC QN AUTO: 29 PG (ref 24–34)
MCHC RBC AUTO-ENTMCNC: 33.4 G/DL (ref 31–37)
MCV RBC AUTO: 86.8 FL (ref 74–97)
MONOCYTES # BLD: 0.3 K/UL (ref 0.05–1.2)
MONOCYTES NFR BLD: 7 % (ref 3–10)
NEUTS SEG # BLD: 2.3 K/UL (ref 1.8–8)
NEUTS SEG NFR BLD: 51 % (ref 40–73)
NITRITE UR QL STRIP.AUTO: NEGATIVE
P-R INTERVAL, ECG05: 144 MS
PH UR STRIP: 6.5 [PH] (ref 5–8)
PLATELET # BLD AUTO: 183 K/UL (ref 135–420)
PMV BLD AUTO: 10.8 FL (ref 9.2–11.8)
POTASSIUM SERPL-SCNC: 3 MMOL/L (ref 3.5–5.5)
PROT UR STRIP-MCNC: NEGATIVE MG/DL
Q-T INTERVAL, ECG07: 434 MS
QRS DURATION, ECG06: 84 MS
QTC CALCULATION (BEZET), ECG08: 434 MS
RBC # BLD AUTO: 3.86 M/UL (ref 4.2–5.3)
SODIUM SERPL-SCNC: 134 MMOL/L (ref 136–145)
SP GR UR REFRACTOMETRY: <1.005 (ref 1–1.03)
TROPONIN I SERPL-MCNC: <0.02 NG/ML (ref 0–0.06)
TSH SERPL DL<=0.05 MIU/L-ACNC: 3.56 UIU/ML (ref 0.36–3.74)
UROBILINOGEN UR QL STRIP.AUTO: 0.2 EU/DL (ref 0.2–1)
VENTRICULAR RATE, ECG03: 60 BPM
WBC # BLD AUTO: 4.7 K/UL (ref 4.6–13.2)

## 2018-06-14 PROCEDURE — 99285 EMERGENCY DEPT VISIT HI MDM: CPT

## 2018-06-14 PROCEDURE — 85025 COMPLETE CBC W/AUTO DIFF WBC: CPT | Performed by: EMERGENCY MEDICINE

## 2018-06-14 PROCEDURE — 81003 URINALYSIS AUTO W/O SCOPE: CPT | Performed by: EMERGENCY MEDICINE

## 2018-06-14 PROCEDURE — 96361 HYDRATE IV INFUSION ADD-ON: CPT

## 2018-06-14 PROCEDURE — 85379 FIBRIN DEGRADATION QUANT: CPT | Performed by: EMERGENCY MEDICINE

## 2018-06-14 PROCEDURE — 74011250636 HC RX REV CODE- 250/636: Performed by: EMERGENCY MEDICINE

## 2018-06-14 PROCEDURE — 80048 BASIC METABOLIC PNL TOTAL CA: CPT | Performed by: EMERGENCY MEDICINE

## 2018-06-14 PROCEDURE — 93005 ELECTROCARDIOGRAM TRACING: CPT

## 2018-06-14 PROCEDURE — 84443 ASSAY THYROID STIM HORMONE: CPT | Performed by: EMERGENCY MEDICINE

## 2018-06-14 PROCEDURE — 74011250637 HC RX REV CODE- 250/637: Performed by: EMERGENCY MEDICINE

## 2018-06-14 PROCEDURE — 82550 ASSAY OF CK (CPK): CPT | Performed by: EMERGENCY MEDICINE

## 2018-06-14 PROCEDURE — 96360 HYDRATION IV INFUSION INIT: CPT

## 2018-06-14 RX ORDER — POTASSIUM CHLORIDE 20 MEQ/1
40 TABLET, EXTENDED RELEASE ORAL
Status: COMPLETED | OUTPATIENT
Start: 2018-06-14 | End: 2018-06-14

## 2018-06-14 RX ORDER — SODIUM CHLORIDE 9 MG/ML
1000 INJECTION, SOLUTION INTRAVENOUS ONCE
Status: COMPLETED | OUTPATIENT
Start: 2018-06-14 | End: 2018-06-14

## 2018-06-14 RX ORDER — HYDROCHLOROTHIAZIDE 25 MG/1
25 TABLET ORAL DAILY
COMMUNITY
End: 2018-08-02 | Stop reason: SDUPTHER

## 2018-06-14 RX ORDER — LOSARTAN POTASSIUM 100 MG/1
100 TABLET ORAL DAILY
COMMUNITY
End: 2018-08-02 | Stop reason: SDUPTHER

## 2018-06-14 RX ORDER — POTASSIUM CHLORIDE 750 MG/1
10 TABLET, FILM COATED, EXTENDED RELEASE ORAL 2 TIMES DAILY
Qty: 6 TAB | Refills: 0 | Status: SHIPPED | OUTPATIENT
Start: 2018-06-14 | End: 2018-06-17

## 2018-06-14 RX ORDER — SIMVASTATIN 20 MG/1
10 TABLET, FILM COATED ORAL
COMMUNITY
End: 2018-08-02 | Stop reason: SDUPTHER

## 2018-06-14 RX ADMIN — SODIUM CHLORIDE 1000 ML: 900 INJECTION, SOLUTION INTRAVENOUS at 01:41

## 2018-06-14 RX ADMIN — SODIUM CHLORIDE 1000 ML: 900 INJECTION, SOLUTION INTRAVENOUS at 02:16

## 2018-06-14 RX ADMIN — POTASSIUM CHLORIDE 40 MEQ: 20 TABLET, EXTENDED RELEASE ORAL at 02:14

## 2018-06-14 NOTE — ED PROVIDER NOTES
HPI Comments: 1:41 AM Inocente Madison is a 37 y.o. female who presents to ED via EMS complaining of rapid HR and light headedness that started around 1230 this morning. She states she checked her fit bit watch at home and her HR was 120. Reports she is feeling anxious and nervous while presenting. 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 Before this episode of sx she was feeling fine. Denies CP, SOB, nausea, vomiting, back pain, weakness, numbness, or any other associated sx. No other complaints or concerns in the ED. PCP: No primary care provider on file. The history is provided by the patient. No  was used. Past Medical History:   Diagnosis Date    Hypertension        No past surgical history on file. No family history on file. Social History     Social History    Marital status: SINGLE     Spouse name: N/A    Number of children: N/A    Years of education: N/A     Occupational History    Not on file. Social History Main Topics    Smoking status: Not on file    Smokeless tobacco: Not on file    Alcohol use Not on file    Drug use: Not on file    Sexual activity: Not on file     Other Topics Concern    Not on file     Social History Narrative    No narrative on file         ALLERGIES: Seafood    Review of Systems   Constitutional: Negative for chills and fever. Respiratory: Negative for shortness of breath. Cardiovascular: Positive for palpitations. Negative for chest pain and leg swelling. Gastrointestinal: Negative for diarrhea, nausea and vomiting. Musculoskeletal: Negative for back pain. Neurological: Positive for light-headedness. Negative for dizziness, syncope, weakness and numbness. Psychiatric/Behavioral: The patient is nervous/anxious. All other systems reviewed and are negative.       Vitals:    06/14/18 0121 06/14/18 0200 06/14/18 0246   BP: 112/75 115/66 121/67   Pulse: 63 63 60   Resp: 14 13 12   Temp: 97.5 °F (36.4 °C)     SpO2: 100% 99% 100%   Weight: 83.5 kg (184 lb)     Height: 5' 2\" (1.575 m)              Physical Exam   Constitutional: She is oriented to person, place, and time. She appears well-developed. HENT:   Head: Normocephalic. Mouth/Throat: Oropharynx is clear and moist.   Eyes: Pupils are equal, round, and reactive to light. Neck: Normal range of motion. Cardiovascular: Normal rate and normal heart sounds. No murmur heard. Pulmonary/Chest: Effort normal. She has no wheezes. She has no rales. Abdominal: Soft. There is no tenderness. Musculoskeletal: Normal range of motion. She exhibits no edema. Neurological: She is alert and oriented to person, place, and time. Skin: Skin is warm and dry. Psychiatric: Her mood appears anxious. Mildly anxious. Nursing note and vitals reviewed. [facility]      ED Course       Procedures    Vitals:  No data found. Medications ordered:   Medications   0.9% sodium chloride infusion 1,000 mL (0 mL IntraVENous IV Completed 6/14/18 0216)   potassium chloride (K-DUR, KLOR-CON) SR tablet 40 mEq (40 mEq Oral Given 6/14/18 0214)   0.9% sodium chloride infusion 1,000 mL (0 mL IntraVENous IV Completed 6/14/18 0252)         Lab findings:  No results found for this or any previous visit (from the past 12 hour(s)). X-Ray, CT or other radiology findings or impressions:  No orders to display       Progress notes, Consult notes or additional Procedure notes:   2:27 AM pt feels better, no complaints. Stable for dc and close f/u. Not c/w cad/pe/ptx/pna. Verb understanding of det ret inst given and agrees w dc plan. No emc. Disposition:  Diagnosis:   1. Palpitations    2.  Hypokalemia        Disposition: home    Follow-up Information     Follow up With Details Comments [address] Schedule an appointment as soon as possible for a visit in 1 day or your physician [address] [place] [address]  [address]  [place] [address]   [phone]           Discharge Medication List as of 6/14/2018  2:27 AM      START taking these medications    Details   potassium chloride SR (KLOR-CON 10) 10 mEq tablet Take 1 Tab by mouth two (2) times a day for 3 days. , Print, Disp-6 Tab, R-0         CONTINUE these medications which have NOT CHANGED    Details   losartan (COZAAR) 100 mg tablet Take 100 mg by mouth daily. , Historical Med      hydroCHLOROthiazide (HYDRODIURIL) 25 mg tablet Take 25 mg by mouth daily. , Historical Med      simvastatin (ZOCOR) 20 mg tablet Take 10 mg by mouth nightly., Historical Med             [facility] acting as a scribe for and in the presence of Tiana Goldman MD      June 14, 2018 at 1:47 AM       Provider Attestation:      I personally performed the services described in the documentation, reviewed the documentation, as recorded by the scribe in my presence, and it accurately and completely records my words and actions.  June 14, 2018 at 1:47 AM - Tiana Goldman MD

## 2018-06-14 NOTE — PROGRESS NOTES
Progress Note: Weekly Education Class in the [facility] Weight Loss Program   Is there anything that you or the patient needs to let Dr Ruben Monzon know about? no  Over the past week, have you experienced any side-effects? placido    Poly Mary is a 37 y.o. female who is enrolled in [facility] Loss Program    Visit Vitals    Ht 5' 1.5\" (1.562 m)     Weight Metrics 6/7/2018 5/29/2018 5/29/2018 7/26/2017 12/9/2016 11/11/2016 2/11/2016   Weight 188 lb 11.2 oz - 191 lb 14.4 oz 186 lb 185 lb 187 lb 190 lb   Waist Measure Inches - 34 - - - - -   Body Fat % - 39.7 - - - - -   BMI 35.08 kg/m2 - 31.93 kg/m2 30.95 kg/m2 36.13 kg/m2 36.52 kg/m2 37.11 kg/m2         Have you received any other medical care this week? yes  If yes, where and for what? Ed for dehydration    Have you had any change in your medications since your last visit? no  If yes what? Did you have any problems adhering to the program last week? no  If yes, please explain:       Eating Habits Over Last Week:  Did you take in 64 oz of non-caloric fluids? yes     Did you consume your 4 meal replacements each day?  yes       Physical Activity Over the Past Week:    Aerobic exercise: 45 min  Resistance exercise: 0 workouts / week

## 2018-06-14 NOTE — ED NOTES
I have reviewed discharge instructions with the patient. The patient verbalized understanding. Patient armband removed and given to patient to take home. Patient was informed of the privacy risks if armband lost or stolen  Current Discharge Medication List      START taking these medications    Details   potassium chloride SR (KLOR-CON 10) 10 mEq tablet Take 1 Tab by mouth two (2) times a day for 3 days.   Qty: 6 Tab, Refills: 0

## 2018-06-14 NOTE — DISCHARGE INSTRUCTIONS
Return for pain, fever, shortness of breath, vomiting, decreased fluid intake, weakness, numbness, dizziness, or any change or concerns. Hypokalemia: Care Instructions  Your Care Instructions    Hypokalemia (say \"sr-ct-yhb-HEIDI-alejandra-uh\") is a low level of potassium. The heart, muscles, kidneys, and nervous system all need potassium to work well. This problem has many different causes. Kidney problems, diet, and medicines like diuretics and laxatives can cause it. So can vomiting or diarrhea. In some cases, cancer is the cause. Your doctor may do tests to find the cause of your low potassium levels. You may need medicines to bring your potassium levels back to normal. You may also need regular blood tests to check your potassium. If you have very low potassium, you may need intravenous (IV) medicines. You also may need tests to check the electrical activity of your heart. Heart problems caused by low potassium levels can be very serious. Follow-up care is a key part of your treatment and safety. Be sure to make and go to all appointments, and call your doctor if you are having problems. It's also a good idea to know your test results and keep a list of the medicines you take. How can you care for yourself at home? · If your doctor recommends it, eat foods that have a lot of potassium. These include fresh fruits, juices, and vegetables. They also include nuts, beans, and milk. · Be safe with medicines. If your doctor prescribes medicines or potassium supplements, take them exactly as directed. Call your doctor if you have any problems with your medicines. · Get your potassium levels tested as often as your doctor tells you. When should you call for help? Call 911 anytime you think you may need emergency care. For example, call if:  ? · You feel like your heart is missing beats. Heart problems caused by low potassium can cause death. ? · You passed out (lost consciousness).    ? · You have a seizure. ?Call your doctor now or seek immediate medical care if:  ? · You feel weak or unusually tired. ? · You have severe arm or leg cramps. ? · You have tingling or numbness. ? · You feel sick to your stomach, or you vomit. ? · You have belly cramps. ? · You feel bloated or constipated. ? · You have to urinate a lot. ? · You feel very thirsty most of the time. ? · You are dizzy or lightheaded, or you feel like you may faint. ? · You feel depressed, or you lose touch with reality. ? Watch closely for changes in your health, and be sure to contact your doctor if:  ? · You do not get better as expected. Where can you learn more? Go to http://elvie-yi.info/. Enter G358 in the search box to learn more about \"Hypokalemia: Care Instructions. \"  Current as of: May 12, 2017  Content Version: 11.4  © 5227-8186 [employer]. Care instructions adapted under license by [employer] (which disclaims liability or warranty for this information). If you have questions about a medical condition or this instruction, always ask your healthcare professional. [place] [address] any warranty or liability for your use of this information. Palpitations: Care Instructions  Your Care Instructions    Heart palpitations are the uncomfortable sensation that your heart is beating fast or irregularly. You might feel pounding or fluttering in your chest. It might feel like your heart is skipping a beat. Although palpitations may be caused by a heart problem, they also occur because of stress, fatigue, or use of alcohol, caffeine, or nicotine. Many medicines, including diet pills, antihistamines, decongestants, and some herbal products, can cause heart palpitations. Nearly everyone has palpitations from time to time. Depending on your symptoms, your doctor may need to do more tests to try to find the cause of your palpitations.   Follow-up care is a key part of your treatment and safety. Be sure to make and go to all appointments, and call your doctor if you are having problems. It's also a good idea to know your test results and keep a list of the medicines you take. How can you care for yourself at home? · Avoid caffeine, nicotine, and excess alcohol. · Do not take illegal drugs, such as methamphetamines and cocaine. · Do not take weight loss or diet medicines unless you talk with your doctor first.  · Get plenty of sleep. · Do not overeat. · If you have palpitations again, take deep breaths and try to relax. This may slow a racing heart. · If you start to feel lightheaded, lie down to avoid injuries that might result if you pass out and fall down. · Keep a record of your palpitations and bring it to your next doctor's appointment. Write down:  ¨ The date and time. ¨ Your pulse. (If your heart is beating fast, it may be hard to count your pulse.)  ¨ What you were doing when the palpitations started. ¨ How long the palpitations lasted. ¨ Any other symptoms. · If an activity causes palpitations, slow down or stop. Talk to your doctor before you do that activity again. · Take your medicines exactly as prescribed. Call your doctor if you think you are having a problem with your medicine. When should you call for help? Call 911 anytime you think you may need emergency care. For example, call if:  ? · You passed out (lost consciousness). ? · You have symptoms of a heart attack. These may include:  ¨ Chest pain or pressure, or a strange feeling in the chest.  ¨ Sweating. ¨ Shortness of breath. ¨ Pain, pressure, or a strange feeling in the back, neck, jaw, or upper belly or in one or both shoulders or arms. ¨ Lightheadedness or sudden weakness. ¨ A fast or irregular heartbeat. After you call 911, the  may tell you to chew 1 adult-strength or 2 to 4 low-dose aspirin. Wait for an ambulance. Do not try to drive yourself.    ? · You have symptoms of a stroke. These may include:  ¨ Sudden numbness, tingling, weakness, or loss of movement in your face, arm, or leg, especially on only one side of your body. ¨ Sudden vision changes. ¨ Sudden trouble speaking. ¨ Sudden confusion or trouble understanding simple statements. ¨ Sudden problems with walking or balance. ¨ A sudden, severe headache that is different from past headaches. ?Call your doctor now or seek immediate medical care if:  ? · You have heart palpitations and:  ¨ Are dizzy or lightheaded, or you feel like you may faint. ¨ Have new or increased shortness of breath. ? Watch closely for changes in your health, and be sure to contact your doctor if:  ? · You continue to have heart palpitations. Where can you learn more? Go to http://elvie-yi.info/. Enter R508 in the search box to learn more about \"Palpitations: Care Instructions. \"  Current as of: September 21, 2016  Content Version: 11.4  © 1884-6699 [employer]. Care instructions adapted under license by [employer] acquired by [employer] (which disclaims liability or warranty for this information). If you have questions about a medical condition or this instruction, always ask your healthcare professional. [place] [address] any warranty or liability for your use of this information.

## 2018-06-22 ENCOUNTER — CLINICAL SUPPORT (OUTPATIENT)
Dept: FAMILY MEDICINE CLINIC | Age: 43
End: 2018-06-22

## 2018-06-22 VITALS
RESPIRATION RATE: 16 BRPM | SYSTOLIC BLOOD PRESSURE: 125 MMHG | DIASTOLIC BLOOD PRESSURE: 82 MMHG | HEART RATE: 83 BPM | WEIGHT: 183.7 LBS | BODY MASS INDEX: 33.8 KG/M2 | HEIGHT: 62 IN

## 2018-06-22 DIAGNOSIS — E66.9 OBESITY (BMI 30.0-34.9): Primary | ICD-10-CM

## 2018-06-22 NOTE — PROGRESS NOTES
Progress Note: Weekly Education Class in the [facility] Weight Loss Program    Is there anything that you or the patient needs to let Dr Dina Carrillo know about? Yes, patient feels that the sodium amount in the soups should be divulged in orientation stress heavily to  participates that each product is not the same in sodium, potassium, cholesterol and vitamins. Over the past week, have you experienced any side-effects? no    Mc Sotomayor is a 37 y.o. female who is enrolled in [facility] Loss Program    Visit Vitals    Ht 5' 1.5\" (1.562 m)     Weight Metrics 6/14/2018 6/14/2018 6/14/2018 6/7/2018 5/29/2018 5/29/2018 7/26/2017   Weight - 184 lb 188 lb 11.2 oz 188 lb 11.2 oz - 191 lb 14.4 oz 186 lb   Waist Measure Inches 34.25 - - - 34 - -   Body Fat % 39.4 - - - 39.7 - -   BMI - 33.65 kg/m2 35.08 kg/m2 35.08 kg/m2 - 31.93 kg/m2 30.95 kg/m2         Have you received any other medical care this week? no  If yes, where and for what? Have you had any change in your medications since your last visit? yes  If yes what? Had to start back taking HCTZ for swelling. Did you have any problems adhering to the program last week? no  If yes, please explain:       Eating Habits Over Last Week:  Did you take in 64 oz of non-caloric fluids? yes plus    Did you consume your 4 meal replacements each day?  yes       Physical Activity Over the Past Week:    Aerobic exercise: 90 min  Resistance exercise: 0 workouts / week

## 2018-06-29 ENCOUNTER — CLINICAL SUPPORT (OUTPATIENT)
Dept: FAMILY MEDICINE CLINIC | Age: 43
End: 2018-06-29

## 2018-06-29 VITALS
WEIGHT: 180.3 LBS | SYSTOLIC BLOOD PRESSURE: 106 MMHG | HEIGHT: 62 IN | DIASTOLIC BLOOD PRESSURE: 80 MMHG | BODY MASS INDEX: 33.18 KG/M2 | HEART RATE: 65 BPM | RESPIRATION RATE: 16 BRPM

## 2018-06-29 DIAGNOSIS — E66.9 OBESITY (BMI 30.0-34.9): Primary | ICD-10-CM

## 2018-06-29 NOTE — PROGRESS NOTES
Pauly Sin RN     Progress Note: Weekly Education Class in the [facility] Weight Loss Program   Is there anything that you or the patient needs to let Dr Amari Pickett know about? no  Over the past week, have you experienced any side-effects? no    Micki Ag is a 37 y.o. female who is enrolled in [facility] Weight Loss Program    Visit Vitals    Ht 5' 1.5\" (1.562 m)    Wt 180 lb 4.8 oz (81.8 kg)    BMI 33.52 kg/m2     Weight Metrics 6/29/2018 6/29/2018 6/22/2018 6/22/2018 6/14/2018 6/14/2018 6/14/2018   Weight - 180 lb 4.8 oz - 183 lb 11.2 oz - 184 lb 188 lb 11.2 oz   Waist Measure Inches 32.25 - 33 - 34.25 - -   Body Fat % 38.1 - 38.9 - 39.4 - -   BMI - 33.52 kg/m2 - 34.15 kg/m2 - 33.65 kg/m2 35.08 kg/m2         Have you received any other medical care this week? no  If yes, where and for what? Have you had any change in your medications since your last visit? no  If yes what? Did you have any problems adhering to the program last week? No  If yes, please explain:        Eating Habits Over Last Week:  Did you take in 64 oz of non-caloric fluids?  yes     Did you consume your 4 meal replacements each day? no       Physical Activity Over the Past Week:    Aerobic exercise: 150  min  Resistance exercise: 0 workouts / week

## 2018-07-05 ENCOUNTER — OFFICE VISIT (OUTPATIENT)
Dept: FAMILY MEDICINE CLINIC | Age: 43
End: 2018-07-05

## 2018-07-05 VITALS
SYSTOLIC BLOOD PRESSURE: 104 MMHG | BODY MASS INDEX: 33.05 KG/M2 | HEART RATE: 76 BPM | RESPIRATION RATE: 16 BRPM | HEIGHT: 62 IN | DIASTOLIC BLOOD PRESSURE: 77 MMHG | TEMPERATURE: 97.4 F | WEIGHT: 179.6 LBS

## 2018-07-05 DIAGNOSIS — E55.9 VITAMIN D DEFICIENCY: ICD-10-CM

## 2018-07-05 DIAGNOSIS — E66.9 OBESITY (BMI 30.0-34.9): Primary | ICD-10-CM

## 2018-07-05 DIAGNOSIS — E78.5 HYPERLIPIDEMIA, UNSPECIFIED HYPERLIPIDEMIA TYPE: ICD-10-CM

## 2018-07-05 NOTE — PATIENT INSTRUCTIONS
Constipation   -Drink 64  To 100 oz of non-calorie fluid a day; don't go over 100 oz a day though.  -Add fiber (at least 20 grams a day)         1. Get the New Direction products with fiber added     2. Use SUGAR-FREE products: Fiber One products, Benefiber or Metamucil      If you're prone to constipation: Take a Stool Softener every day.     (eg - Dulcolax Stool Softener 100 mg or Miralax)    If you get constipated:     1. Start with a Stool Softener (produces a bowel movement in 72 hr):   Examples:      Miralax 1 capful twice a day (It's OK to go up to 3 caps for a few days)      Dulcolax Stool Softener 100 mg       2. Next: If you still have constipation after 2 or 3 days, add a Stimulant          Laxative (this will produce a bowel movement in 6 - 12 hr):   Examples:      Exlax (1 small square) for up to 4 days      Dulcolax stimulant laxative (8.25 mg)       Magnesium citrate pill 500 mg 3 - 4 pills a day       3. Or you can go straight to a combination Stool Softener / Stimulant at night. If  you need, you can add a morning dose. Examples:      Pericolace 50 mg / 8.25 mg      Senokot-S  50 mg / 8.25 mg       4. Finally If You're Really locked up, get Liquid Magnesium Citrate (take  according to the directions)        If you're going to be on a fluid pill, you need to come in for weekly electrolyte checks. Don't play into your co-worker's drama.    &&&&&&&&&&&&&&&&&&&&&&&&&&&&&&&&&&&&&&&&&&&&&&       Body Mass Index: Care Instructions  Your Care Instructions    Body mass index (BMI) can help you see if your weight is raising your risk for health problems. It uses a formula to compare how much you weigh with how tall you are. · A BMI lower than 18.5 is considered underweight. · A BMI between 18.5 and 24.9 is considered healthy. · A BMI between 25 and 29.9 is considered overweight. A BMI of 30 or higher is considered obese.   If your BMI is in the normal range, it means that you have a lower risk for weight-related health problems. If your BMI is in the overweight or obese range, you may be at increased risk for weight-related health problems, such as high blood pressure, heart disease, stroke, arthritis or joint pain, and diabetes. If your BMI is in the underweight range, you may be at increased risk for health problems such as fatigue, lower protection (immunity) against illness, muscle loss, bone loss, hair loss, and hormone problems. BMI is just one measure of your risk for weight-related health problems. You may be at higher risk for health problems if you are not active, you eat an unhealthy diet, or you drink too much alcohol or use tobacco products. Follow-up care is a key part of your treatment and safety. Be sure to make and go to all appointments, and call your doctor if you are having problems. It's also a good idea to know your test results and keep a list of the medicines you take. How can you care for yourself at home? · Practice healthy eating habits. This includes eating plenty of fruits, vegetables, whole grains, lean protein, and low-fat dairy. · If your doctor recommends it, get more exercise. Walking is a good choice. Bit by bit, increase the amount you walk every day. Try for at least 30 minutes on most days of the week. · Do not smoke. Smoking can increase your risk for health problems. If you need help quitting, talk to your doctor about stop-smoking programs and medicines. These can increase your chances of quitting for good. · Limit alcohol to 2 drinks a day for men and 1 drink a day for women. Too much alcohol can cause health problems. If you have a BMI higher than 25  · Your doctor may do other tests to check your risk for weight-related health problems. This may include measuring the distance around your waist. A waist measurement of more than 40 inches in men or 35 inches in women can increase the risk of weight-related health problems.   · Talk with your doctor about steps you can take to stay healthy or improve your health. You may need to make lifestyle changes to lose weight and stay healthy, such as changing your diet and getting regular exercise. If you have a BMI lower than 18.5  · Your doctor may do other tests to check your risk for health problems. · Talk with your doctor about steps you can take to stay healthy or improve your health. You may need to make lifestyle changes to gain or maintain weight and stay healthy, such as getting more healthy foods in your diet and doing exercises to build muscle. Where can you learn more? Go to http://elvie-yi.info/. Enter S176 in the search box to learn more about \"Body Mass Index: Care Instructions. \"  Current as of: October 13, 2016  Content Version: 11.4  © 5502-8305 [employer], [employer]. Care instructions adapted under license by [employer] (which disclaims liability or warranty for this information). If you have questions about a medical condition or this instruction, always ask your healthcare professional. [address] any warranty or liability for your use of this information.

## 2018-07-05 NOTE — PROGRESS NOTES
[facility] Weight Loss Program Progress Note:   F/up Physician Visit for the VLCD / LCD Program    CC: Weight Management    Alyx Toussaint is a 37 y.o. female who is here for her f/up physician visit for the [facility].    Weight History  Ideal body weight: 107 lb 14.6 oz (48.9 kg)  Adjusted ideal body weight: 136 lb 9.4 oz (62 kg) (Based on [address] [facility] [address])    Wt Readings from Last 10 Encounters:   07/05/18 179 lb 9.6 oz (81.5 kg)   06/29/18 180 lb 4.8 oz (81.8 kg)   06/22/18 183 lb 11.2 oz (83.3 kg)   06/14/18 188 lb 11.2 oz (85.6 kg)   06/14/18 184 lb (83.5 kg)   06/07/18 188 lb 11.2 oz (85.6 kg)   05/29/18 191 lb 14.4 oz (87 kg)   07/26/17 186 lb (84.4 kg)   12/09/16 185 lb (83.9 kg)   11/11/16 187 lb (84.8 kg)       Weight Metrics 7/5/2018 6/29/2018 6/29/2018 6/22/2018 6/22/2018 6/14/2018 6/14/2018   Weight 179 lb 9.6 oz - 180 lb 4.8 oz - 183 lb 11.2 oz - 184 lb   Waist Measure Inches - 32.25 - 33 - 34.25 -   Body Fat % - 38.1 - 38.9 - 39.4 -   BMI 33.39 kg/m2 - 33.52 kg/m2 - 34.15 kg/m2 - 33.65 kg/m2         Medications Currently Taking  Current Outpatient Prescriptions   Medication Sig Dispense Refill    losartan (COZAAR) 100 mg tablet Take 100 mg by mouth daily.  hydroCHLOROthiazide (HYDRODIURIL) 25 mg tablet Take 25 mg by mouth daily.  simvastatin (ZOCOR) 20 mg tablet Take 10 mg by mouth nightly.  albuterol (PROVENTIL HFA, VENTOLIN HFA, PROAIR HFA) 90 mcg/actuation inhaler 1-2 Puffs.  benzonatate (TESSALON) 100 mg capsule Take 1 capsule 3 times daily as needed for coughing, swallow capsules whole.  cetirizine (ZYRTEC) 10 mg tablet 10 mg.      ubidecarenone/vitamin E mixed (COQ10  PO) Take  by mouth.  iron, carbonyl (FEOSOL) 45 mg tab Take 1 Tab by mouth every seven (7) days.  L gasseri/B bifidum/B longum (Omate PO) Take  by mouth.  ascorbic acid, vitamin C, (VITAMIN C) 500 mg tablet Take  by mouth.       multivitamin (ONE A DAY) tablet Take 1 Tab by mouth daily.  pyridoxine HCl, vitamin B6, (VITAMIN B-6 PO) Take  by mouth.  fish oil-omega-3 fatty acids 340-1,000 mg capsule Take 1 Cap by mouth daily.  FIBER, PSYLLIUM HUSK, PO Take  by mouth.  Ascorbic Acid-Multivits-Min (EMERGEN-C) 1,000 mg pwep Take  by mouth.  magnesium 250 mg tab Take  by mouth.  zinc 50 mg tab tablet Take  by mouth daily.  lactobacillus rhamnosus gg 15 billion cell (CULTURELLE) 15 billion cell capsule Take 1 Cap by mouth daily.  potassium 99 mg tablet Take 99 mg by mouth two (2) times a day.  butalbital-acetaminophen-caff (FIORICET) -40 mg per capsule Take 1-2 Caps by mouth every six (6) hours as needed for Pain. Max Daily Amount: 8 Caps. 20 Cap 0    simvastatin (ZOCOR) 10 mg tablet Take 20 mg by mouth nightly.  losartan (COZAAR) 100 mg tablet Take 100 mg by mouth daily.  hydrochlorothiazide (HYDRODIURIL) 25 mg tablet Take 25 mg by mouth daily. Participation   Have you been attending class on a regular basis? yes    Review of Systems  Since your last visit, have you experienced any complications? yes  constipation   If yes, please list: constipation    Are you taking an appetite suppressant? no  If so:  Do you need a refill? no  Are you experiencine any Chest Pain, Palpitations or Dizziness? no    BP Readings from Last 3 Encounters:   06/29/18 106/80   06/22/18 125/82   06/14/18 117/77       Have you received any other medical care this week? no  If yes, where and for what? Have you discontinued or changed any medicine or dose of your medicine(s) since your last visit with Dr Latisha Stephens? no    If yes, where and for what? Diet  How many ounces of calorie-free liquids did you consume each day?   128 oz    How many meal replacements did you take each day? 2 replacement     Did you have any problems adhering to the program?  no   If yes, please explain:      Exercise  Aerobic exercise: 90 min  Resistance exercise: 0 workouts / week  Any discomfort?  no     If yes, where? Objective  Visit Vitals    Resp 16    Ht 5' 1.5\" (1.562 m)    Wt 179 lb 9.6 oz (81.5 kg)    BMI 33.39 kg/m2     No LMP recorded. Patient has had a hysterectomy. Physical Exam  Appearance: Obese, A&O x 3, NAD  HEENT:  NC/AT, EOMI, PERRL, No scleral icterus    Assessment / Plan    Encounter Diagnosis   Name Primary?  Obesity (BMI 30.0-34. 9) Yes       1. Weight management    improved   Progress was reviewed with patient   Goal(s) for next appointment:   Keep up the good work      2. Labs    Latest results reviewed with patient   Routine monitoring labs ordered yes    -Lab slip given to pt for off-site [facility] labs no    10 minutes of the 15 minutes face to face time with Cara Munoz consisted of counseling & coordinating and/or discussing treatment plans in reference to her The encounter diagnosis was Obesity (BMI 30.0-34.9).

## 2018-07-05 NOTE — MR AVS SNAPSHOT
Pam Diaz [place] [address] [facility] [address] [facility] 83 [address] [phone] Patient: Franc Craven MRN: [id number] OYS:[birth year] Visit Information Date & Time Provider Department Dept. Phone Encounter #  
 7/5/2018  9:45 AM Zeke Cutlerbeny 13 [id number] Follow-up Instructions Return in about 4 weeks (around 8/2/2018) for [facility] & Lab Draw. Your Appointments 7/13/2018 30:05 AM  
METABOLIC PROGRAM 5 with HRMP WEEKLY CLASS AMA  
HR Metabolic Program ([facility]) Appt Note: wt check and bp check HR Metabolic Program ([facility]) [phone]  
  
    
 7/20/2018 91:80 AM  
METABOLIC PROGRAM 5 with HRMP WEEKLY CLASS AMA  
HR Metabolic Program ([facility]) Appt Note: wt check and bp check HR Metabolic Program ([facility]) [phone]  
  
    
 7/27/2018 96:35 AM  
METABOLIC PROGRAM 5 with HRMP WEEKLY CLASS AMA  
HR Metabolic Program ([facility]) Appt Note: wt check and bp check HR Metabolic Program ([facility]) [phone] Upcoming Health Maintenance Date Due DTaP/Tdap/Td series (1 - Tdap) 2/14/1994 PAP AKA CERVICAL CYTOLOGY 2/14/1994 Influenza Age 5 to Adult 8/1/2018 Allergies as of 7/5/2018  Review Complete On: 7/5/2018 By: Norm Obrien DO Severity Noted Reaction Type Reactions Seafood  06/14/2018    Hives Shellfish Derived  08/27/2014    Angioedema Current Immunizations  Never Reviewed No immunizations on file. Not reviewed this visit You Were Diagnosed With   
  
 Codes Comments Obesity (BMI 30.0-34.9)    -  Primary ICD-10-CM: M15.1 ICD-9-CM: 278.00 Hyperlipidemia, unspecified hyperlipidemia type     ICD-10-CM: E78.5 ICD-9-CM: 272.4 Vitamin D deficiency     ICD-10-CM: E55.9 ICD-9-CM: 268.9 Vitals BP Pulse Temp Resp Height(growth percentile) Weight(growth percentile) 104/77 (BP 1 Location: Left arm, BP Patient Position: At rest) 76 97.4 °F (36.3 °C) 16 5' 1.5\" (1.562 m) 179 lb 9.6 oz (81.5 kg) BMI OB Status Smoking Status 33.39 kg/m2 Hysterectomy Never Smoker Vitals History BMI and BSA Data Body Mass Index Body Surface Area  
 33.39 kg/m 2 1.88 m 2 Your Updated Medication List  
  
   
This list is accurate as of 7/5/18 10:37 AM.  Always use your most recent med list.  
  
  
  
  
 albuterol 90 mcg/actuation inhaler Commonly known as:  PROVENTIL HFA, VENTOLIN HFA, PROAIR HFA  
1-2 Puffs. benzonatate 100 mg capsule Commonly known as:  TESSALON Take 1 capsule 3 times daily as needed for coughing, swallow capsules whole. butalbital-acetaminophen-caff -40 mg per capsule Commonly known as:  [employer] Take 1-2 Caps by mouth every six (6) hours as needed for Pain. Max Daily Amount: 8 Caps. cetirizine 10 mg tablet Commonly known as:  ZYRTEC 10 mg.  
  
 COQ10  PO Take  by mouth. CULTURELLE 15 billion cell capsule Generic drug:  lactobacillus rhamnosus gg 15 billion cell Take 1 Cap by mouth daily. EMERGEN-C 1,000 mg Pwep Generic drug:  Ascorbic Acid-Multivits-Min Take  by mouth. FIBER (PSYLLIUM HUSK) PO Take  by mouth. fish oil-omega-3 fatty acids 340-1,000 mg capsule Take 1 Cap by mouth daily. * hydroCHLOROthiazide 25 mg tablet Commonly known as:  HYDRODIURIL Take 25 mg by mouth daily. * hydroCHLOROthiazide 25 mg tablet Commonly known as:  HYDRODIURIL Take 25 mg by mouth daily. iron, carbonyl 45 mg Tab Commonly known as:  Demond Axe Take 1 Tab by mouth every seven (7) days. * losartan 100 mg tablet Commonly known as:  COZAAR Take 100 mg by mouth daily. * losartan 100 mg tablet Commonly known as:  COZAAR Take 100 mg by mouth daily. magnesium 250 mg Tab Take  by mouth.  
  
 multivitamin tablet Commonly known as:  ONE A DAY Take 1 Tab by mouth daily. [address] Take  by mouth.  
  
 potassium 99 mg tablet Take 99 mg by mouth two (2) times a day. * simvastatin 10 mg tablet Commonly known as:  ZOCOR Take 20 mg by mouth nightly. * simvastatin 20 mg tablet Commonly known as:  ZOCOR Take 10 mg by mouth nightly. VITAMIN B-6 PO Take  by mouth. VITAMIN C 500 mg tablet Generic drug:  ascorbic acid (vitamin C) Take  by mouth. zinc 50 mg Tab tablet Take  by mouth daily. * Notice: This list has 6 medication(s) that are the same as other medications prescribed for you. Read the directions carefully, and ask your doctor or other care provider to review them with you. Follow-up Instructions Return in about 4 weeks (around 8/2/2018) for [facility] & Lab Draw. Patient Instructions Constipation  
-Drink 64  To 100 oz of non-calorie fluid a day; don't go over 100 oz a day though. 
-Add fiber (at least 20 grams a day) 1. Get the New Direction products with fiber added 2. Use SUGAR-FREE products: Fiber One products, Benefiber or Metamucil If you're prone to constipation: Take a Stool Softener every day. 
   (eg - Dulcolax Stool Softener 100 mg or Miralax) If you get constipated: 1. Start with a Stool Softener (produces a bowel movement in 72 hr): 
 Examples: 
    Miralax 1 capful twice a day (It's OK to go up to 3 caps for a few days) Dulcolax Stool Softener 100 mg 2. Next: If you still have constipation after 2 or 3 days, add a Stimulant Laxative (this will produce a bowel movement in 6 - 12 hr): 
 Examples: 
    Exlax (1 small square) for up to 4 days Dulcolax stimulant laxative (8.25 mg) Magnesium citrate pill 500 mg 3 - 4 pills a day 3. Or you can go straight to a combination Stool Softener / Stimulant at night. If  you need, you can add a morning dose. Examples: 
    Pericolace 50 mg / 8.25 mg Senokot-S  50 mg / 8.25 mg 
 
   4. Finally If You're Really locked up, get Liquid Magnesium Citrate (take  according to the directions) If you're going to be on a fluid pill, you need to come in for weekly electrolyte checks. Don't play into your co-worker's drama. 
 
&&&&&&&&&&&&&&&&&&&&&&&&&&&&&&&&&&&&&&&&&&&&&& Body Mass Index: Care Instructions Your Care Instructions Body mass index (BMI) can help you see if your weight is raising your risk for health problems. It uses a formula to compare how much you weigh with how tall you are. · A BMI lower than 18.5 is considered underweight. · A BMI between 18.5 and 24.9 is considered healthy. · A BMI between 25 and 29.9 is considered overweight. A BMI of 30 or higher is considered obese. If your BMI is in the normal range, it means that you have a lower risk for weight-related health problems. If your BMI is in the overweight or obese range, you may be at increased risk for weight-related health problems, such as high blood pressure, heart disease, stroke, arthritis or joint pain, and diabetes. If your BMI is in the underweight range, you may be at increased risk for health problems such as fatigue, lower protection (immunity) against illness, muscle loss, bone loss, hair loss, and hormone problems. BMI is just one measure of your risk for weight-related health problems. You may be at higher risk for health problems if you are not active, you eat an unhealthy diet, or you drink too much alcohol or use tobacco products. Follow-up care is a key part of your treatment and safety. Be sure to make and go to all appointments, and call your doctor if you are having problems. It's also a good idea to know your test results and keep a list of the medicines you take. How can you care for yourself at home? · Practice healthy eating habits.  This includes eating plenty of fruits, vegetables, whole grains, lean protein, and low-fat dairy. · If your doctor recommends it, get more exercise. Walking is a good choice. Bit by bit, increase the amount you walk every day. Try for at least 30 minutes on most days of the week. · Do not smoke. Smoking can increase your risk for health problems. If you need help quitting, talk to your doctor about stop-smoking programs and medicines. These can increase your chances of quitting for good. · Limit alcohol to 2 drinks a day for men and 1 drink a day for women. Too much alcohol can cause health problems. If you have a BMI higher than 25 · Your doctor may do other tests to check your risk for weight-related health problems. This may include measuring the distance around your waist. A waist measurement of more than 40 inches in men or 35 inches in women can increase the risk of weight-related health problems. · Talk with your doctor about steps you can take to stay healthy or improve your health. You may need to make lifestyle changes to lose weight and stay healthy, such as changing your diet and getting regular exercise. If you have a BMI lower than 18.5 · Your doctor may do other tests to check your risk for health problems. · Talk with your doctor about steps you can take to stay healthy or improve your health. You may need to make lifestyle changes to gain or maintain weight and stay healthy, such as getting more healthy foods in your diet and doing exercises to build muscle. Where can you learn more? Go to http://elvie-yi.info/. Enter S176 in the search box to learn more about \"Body Mass Index: Care Instructions. \" Current as of: October 13, 2016 Content Version: 11.4 © 9663-0440 Healthwise, Incorporated. Care instructions adapted under license by [employer] (which disclaims liability or warranty for this information).  If you have questions about a medical condition or this instruction, always ask your healthcare professional. [place] [address] any warranty or liability for your use of this information. Introducing [facility] SERVICES! Kaylie Miller introduces [employer] patient portal. Now you can access parts of your medical record, email your doctor's office, and request medication refills online. 1. In your internet browser, go to [email or web address] 2. Click on the First Time User? Click Here link in the Sign In box. You will see the New Member Sign Up page. 3. Enter your [employer] Access Code exactly as it appears below. You will not need to use this code after youve completed the sign-up process. If you do not sign up before the expiration date, you must request a new code. · [employer] Access Code: [email or web address] Expires: 8/27/2018  2:48 PM 
 
4. Enter the last four digits of your Social Security Number (xxxx) and Date of Birth (mm/dd/yyyy) as indicated and click Submit. You will be taken to the next sign-up page. 5. Create a [employer] ID. This will be your [employer] login ID and cannot be changed, so think of one that is secure and easy to remember. 6. Create a [employer] password. You can change your password at any time. 7. Enter your Password Reset Question and Answer. This can be used at a later time if you forget your password. 8. Enter your e-mail address. You will receive e-mail notification when new information is available in [address] 9. Click Sign Up. You can now view and download portions of your medical record. 10. Click the Download Summary menu link to download a portable copy of your medical information. If you have questions, please visit the Frequently Asked Questions section of the [employer] website. Remember, [employer] is NOT to be used for urgent needs. For medical emergencies, dial 911. Now available from your iPhone and Android! Please provide this summary of care documentation to your next provider. Your primary care clinician is listed as ABUNDIO GUEVARA. If you have any questions after today's visit, please call [phone].

## 2018-07-06 LAB
ANION GAP SERPL CALC-SCNC: 12 MMOL/L
BUN SERPL-MCNC: 12 MG/DL (ref 6–22)
CALCIUM SERPL-MCNC: 9.9 MG/DL (ref 8.4–10.5)
CHLORIDE SERPL-SCNC: 99 MMOL/L (ref 98–110)
CO2 SERPL-SCNC: 31 MMOL/L (ref 20–32)
CREAT SERPL-MCNC: 1 MG/DL (ref 0.5–1.2)
GFRAA, 66117: >60
GFRNA, 66118: 57.3
GLUCOSE SERPL-MCNC: 96 MG/DL (ref 70–99)
POTASSIUM SERPL-SCNC: 3.7 MMOL/L (ref 3.5–5.5)
SODIUM SERPL-SCNC: 142 MMOL/L (ref 133–145)

## 2018-07-18 DIAGNOSIS — E66.9 OBESITY (BMI 35.0-39.9 WITHOUT COMORBIDITY): Primary | ICD-10-CM

## 2018-07-20 ENCOUNTER — CLINICAL SUPPORT (OUTPATIENT)
Dept: FAMILY MEDICINE CLINIC | Age: 43
End: 2018-07-20

## 2018-07-20 VITALS
WEIGHT: 176.7 LBS | BODY MASS INDEX: 32.52 KG/M2 | HEIGHT: 62 IN | RESPIRATION RATE: 16 BRPM | DIASTOLIC BLOOD PRESSURE: 87 MMHG | HEART RATE: 80 BPM | SYSTOLIC BLOOD PRESSURE: 126 MMHG

## 2018-07-20 DIAGNOSIS — E66.09 CLASS 1 OBESITY DUE TO EXCESS CALORIES WITH BODY MASS INDEX (BMI) OF 34.0 TO 34.9 IN ADULT, UNSPECIFIED WHETHER SERIOUS COMORBIDITY PRESENT: Primary | ICD-10-CM

## 2018-07-20 DIAGNOSIS — E66.9 OBESITY (BMI 30.0-34.9): ICD-10-CM

## 2018-07-20 DIAGNOSIS — E66.9 OBESITY (BMI 35.0-39.9 WITHOUT COMORBIDITY): Primary | ICD-10-CM

## 2018-07-20 LAB
ANION GAP SERPL CALC-SCNC: 17 MMOL/L
BUN SERPL-MCNC: 24 MG/DL (ref 6–22)
CALCIUM SERPL-MCNC: 9.4 MG/DL (ref 8.4–10.5)
CHLORIDE SERPL-SCNC: 93 MMOL/L (ref 98–110)
CO2 SERPL-SCNC: 28 MMOL/L (ref 20–32)
CREAT SERPL-MCNC: 1.1 MG/DL (ref 0.5–1.2)
GFRAA, 66117: >60
GFRNA, 66118: 51.5
GLUCOSE SERPL-MCNC: 63 MG/DL (ref 70–99)
POTASSIUM SERPL-SCNC: 3.6 MMOL/L (ref 3.5–5.5)
SODIUM SERPL-SCNC: 138 MMOL/L (ref 133–145)

## 2018-07-20 NOTE — PROGRESS NOTES
Progress Note: Weekly Education Class in the [facility]   Is there anything that you or the patient needs to let Dr Darius Connelly know about? Yes  Weekly classes are all not the same with regards to incentive coupons . Over the past week, have you experienced any side-effects? Yes guerita Clarke is a 37 y.o. female who is enrolled in [facility]    Visit Vitals    Ht 5' 1.5\" (1.562 m)    Wt 176 lb 11.2 oz (80.2 kg)    BMI 32.85 kg/m2     Weight Metrics 7/20/2018 7/20/2018 7/5/2018 6/29/2018 6/29/2018 6/22/2018 6/22/2018   Weight - 176 lb 11.2 oz 179 lb 9.6 oz - 180 lb 4.8 oz - 183 lb 11.2 oz   Waist Measure Inches 31.5 - - 32.25 - 33 -   Body Fat % 37.5 - - 38.1 - 38.9 -   BMI - 32.85 kg/m2 33.39 kg/m2 - 33.52 kg/m2 - 34.15 kg/m2         Have you received any other medical care this week? no  If yes, where and for what? Have you had any change in your medications since your last visit? no  If yes what? Did you have any problems adhering to the program last week? no  If yes, please explain:       Eating Habits Over Last Week:  Did you take in 64 oz of non-caloric fluids?  yes     Did you consume your 4 meal replacements each day? no       Physical Activity Over the Past Week:    Aerobic exercise: 30 min  Resistance exercise: 0 workouts / week

## 2018-07-23 ENCOUNTER — TELEPHONE (OUTPATIENT)
Dept: FAMILY MEDICINE CLINIC | Age: 43
End: 2018-07-23

## 2018-07-26 ENCOUNTER — CLINICAL SUPPORT (OUTPATIENT)
Dept: FAMILY MEDICINE CLINIC | Age: 43
End: 2018-07-26

## 2018-07-26 VITALS
WEIGHT: 175.1 LBS | BODY MASS INDEX: 32.55 KG/M2 | SYSTOLIC BLOOD PRESSURE: 116 MMHG | HEART RATE: 72 BPM | DIASTOLIC BLOOD PRESSURE: 78 MMHG

## 2018-07-26 DIAGNOSIS — E66.9 OBESITY (BMI 30.0-34.9): Primary | ICD-10-CM

## 2018-07-26 NOTE — PROGRESS NOTES
Progress Note: Weekly Education Class in the [facility]   Is there anything that you or the patient needs to let Dr Roger Ordoñez know about? Yes Patient states taking lasix 20mg every other day due to  Increase face, legs, feet, and hands,   Over the past week, have you experienced any side-effects? no    Ramiro Barbosa is a 37 y.o. female who is enrolled in [facility]    Visit Vitals    /78    Pulse 72     Weight Metrics 7/20/2018 7/20/2018 7/5/2018 6/29/2018 6/29/2018 6/22/2018 6/22/2018   Weight - 176 lb 11.2 oz 179 lb 9.6 oz - 180 lb 4.8 oz - 183 lb 11.2 oz   Waist Measure Inches 31.5 - - 32.25 - 33 -   Body Fat % 37.5 - - 38.1 - 38.9 -   BMI - 32.85 kg/m2 33.39 kg/m2 - 33.52 kg/m2 - 34.15 kg/m2         Have you received any other medical care this week? no  If yes, where and for what? Have you had any change in your medications since your last visit? no  If yes what? Did you have any problems adhering to the program last week? no  If yes, please explain:       Eating Habits Over Last Week:  Did you take in 64 oz of non-caloric fluids? yes     Did you consume your 4 meal replacements each day?  yes       Physical Activity Over the Past Week:    Aerobic exercise: 45 min  Resistance exercise: 0 workouts / week

## 2018-08-02 ENCOUNTER — OFFICE VISIT (OUTPATIENT)
Dept: INTERNAL MEDICINE CLINIC | Age: 43
End: 2018-08-02

## 2018-08-02 VITALS
BODY MASS INDEX: 32.7 KG/M2 | OXYGEN SATURATION: 98 % | RESPIRATION RATE: 12 BRPM | HEIGHT: 61 IN | HEART RATE: 82 BPM | TEMPERATURE: 97.6 F | WEIGHT: 173.2 LBS | SYSTOLIC BLOOD PRESSURE: 108 MMHG | DIASTOLIC BLOOD PRESSURE: 74 MMHG

## 2018-08-02 DIAGNOSIS — E78.5 HYPERLIPIDEMIA, UNSPECIFIED HYPERLIPIDEMIA TYPE: ICD-10-CM

## 2018-08-02 DIAGNOSIS — E66.9 OBESITY (BMI 35.0-39.9 WITHOUT COMORBIDITY): Primary | ICD-10-CM

## 2018-08-02 NOTE — PATIENT INSTRUCTIONS
Health Maintenance Due   Topic Date Due    DTaP/Tdap/Td series (1 - Tdap) 02/14/1994    PAP AKA CERVICAL CYTOLOGY  02/14/1994    Influenza Age 5 to Adult  08/01/2018     Monthly goal:    8-12 lbs weight loss         Body Mass Index: Care Instructions  Your Care Instructions    Body mass index (BMI) can help you see if your weight is raising your risk for health problems. It uses a formula to compare how much you weigh with how tall you are. · A BMI lower than 18.5 is considered underweight. · A BMI between 18.5 and 24.9 is considered healthy. · A BMI between 25 and 29.9 is considered overweight. A BMI of 30 or higher is considered obese. If your BMI is in the normal range, it means that you have a lower risk for weight-related health problems. If your BMI is in the overweight or obese range, you may be at increased risk for weight-related health problems, such as high blood pressure, heart disease, stroke, arthritis or joint pain, and diabetes. If your BMI is in the underweight range, you may be at increased risk for health problems such as fatigue, lower protection (immunity) against illness, muscle loss, bone loss, hair loss, and hormone problems. BMI is just one measure of your risk for weight-related health problems. You may be at higher risk for health problems if you are not active, you eat an unhealthy diet, or you drink too much alcohol or use tobacco products. Follow-up care is a key part of your treatment and safety. Be sure to make and go to all appointments, and call your doctor if you are having problems. It's also a good idea to know your test results and keep a list of the medicines you take. How can you care for yourself at home? · Practice healthy eating habits. This includes eating plenty of fruits, vegetables, whole grains, lean protein, and low-fat dairy. · If your doctor recommends it, get more exercise. Walking is a good choice. Bit by bit, increase the amount you walk every day. Try for at least 30 minutes on most days of the week. · Do not smoke. Smoking can increase your risk for health problems. If you need help quitting, talk to your doctor about stop-smoking programs and medicines. These can increase your chances of quitting for good. · Limit alcohol to 2 drinks a day for men and 1 drink a day for women. Too much alcohol can cause health problems. If you have a BMI higher than 25  · Your doctor may do other tests to check your risk for weight-related health problems. This may include measuring the distance around your waist. A waist measurement of more than 40 inches in men or 35 inches in women can increase the risk of weight-related health problems. · Talk with your doctor about steps you can take to stay healthy or improve your health. You may need to make lifestyle changes to lose weight and stay healthy, such as changing your diet and getting regular exercise. If you have a BMI lower than 18.5  · Your doctor may do other tests to check your risk for health problems. · Talk with your doctor about steps you can take to stay healthy or improve your health. You may need to make lifestyle changes to gain or maintain weight and stay healthy, such as getting more healthy foods in your diet and doing exercises to build muscle. Where can you learn more? Go to http://elvie-yi.info/. Enter S176 in the search box to learn more about \"Body Mass Index: Care Instructions. \"  Current as of: October 13, 2016  Content Version: 11.4  © 3114-4366 Healthwise, Incorporated. Care instructions adapted under license by [employer] (which disclaims liability or warranty for this information). If you have questions about a medical condition or this instruction, always ask your healthcare professional. [address] any warranty or liability for your use of this information.

## 2018-08-02 NOTE — PROGRESS NOTES
[facility] Progress Note:   F/up Physician Visit    CC: Obesity      Dahlia Camara is a 37 y.o. female who is here for her  f/up physician visit for the VLCD Program. Deondre Levy has completed 9 weeks of the program to date. Pt [facility] from [facility] office for location convenience of classes. Is taking HCTZ half tab every other day, using edema to guide whether or not she takes it. Wants to come off losartan and simvastatin. Discussed that losartan may be for her kidney protection and she should discuss with her nephrologist. And statin will depend on labs at goal weight. Feels well on diet, doing well with compliance, likes convenience of replacement meals during her work hours. Down 6 lbs since last visit. Starting weight:  191 lbs  Current weight:  173 lbs  Goal weight:  145 lbs    Last  lbs    Weight Metrics 8/2/2018 8/2/2018 7/26/2018 7/20/2018 7/20/2018 7/5/2018 6/29/2018   Weight - 173 lb 3.2 oz 175 lb 1.6 oz - 176 lb 11.2 oz 179 lb 9.6 oz -   Waist Measure Inches 31 - 32 31.5 - - 32.25   Exercise Mins/week 45 - - - - - -   Body Fat % 37.5 - 37.2 37.5 - - 38.1   BMI - 32.28 kg/m2 32.55 kg/m2 - 32.85 kg/m2 33.39 kg/m2 -         Current Outpatient Prescriptions   Medication Sig Dispense Refill    albuterol (PROVENTIL HFA, VENTOLIN HFA, PROAIR HFA) 90 mcg/actuation inhaler 1-2 Puffs.  benzonatate (TESSALON) 100 mg capsule Take 1 capsule 3 times daily as needed for coughing, swallow capsules whole.  cetirizine (ZYRTEC) 10 mg tablet 10 mg.      ubidecarenone/vitamin E mixed (COQ10  PO) Take  by mouth daily.  L gasseri/B bifidum/B longum (Cortex PO) Take  by mouth daily.  ascorbic acid, vitamin C, (VITAMIN C) 500 mg tablet Take 500 mg by mouth daily.  multivitamin (ONE A DAY) tablet Take 1 Tab by mouth daily.  pyridoxine HCl, vitamin B6, (VITAMIN B-6 PO) Take  by mouth daily.       fish oil-omega-3 fatty acids 340-1,000 mg capsule Take 1 Cap by mouth daily.  FIBER, PSYLLIUM HUSK, PO Take  by mouth daily.  Ascorbic Acid-Multivits-Min (EMERGEN-C) 1,000 mg pwep Take  by mouth daily.  magnesium 250 mg tab Take  by mouth daily.  zinc 50 mg tab tablet Take 50 mg by mouth daily.  lactobacillus rhamnosus gg 15 billion cell (CULTURELLE) 15 billion cell capsule Take 1 Cap by mouth daily.  potassium 99 mg tablet Take 99 mg by mouth two (2) times a day.  butalbital-acetaminophen-caff (FIORICET) -40 mg per capsule Take 1-2 Caps by mouth every six (6) hours as needed for Pain. Max Daily Amount: 8 Caps. 20 Cap 0    simvastatin (ZOCOR) 10 mg tablet Take 20 mg by mouth nightly.  losartan (COZAAR) 100 mg tablet Take 100 mg by mouth daily.  hydrochlorothiazide (HYDRODIURIL) 25 mg tablet Take 25 mg by mouth daily. Participation   Did you attend clinic and class last week? no    Review of Systems  Since your last visit, have you experienced any complications? no  If yes, please list:     No CP, SOB, palpitations, lightheadedness, dizziness, constipation. Positives highlight in BOLD. Are you taking an appetite suppressant? no  If so, is there any Chest Pain, Palpitations or Dizziness? BP Readings from Last 10 Encounters:   08/02/18 108/74   07/26/18 116/78   07/20/18 126/87   07/05/18 104/77   06/29/18 106/80   06/22/18 125/82   06/14/18 117/77   06/14/18 121/67   06/07/18 115/82   05/29/18 118/87         Have you received any other medical care this week? yes  If yes, where and for what? Mammogram screening      Have you discontinued or changed any medicine or dose of your medicine since your last visit? no  If yes, where and for what? Diet  How many ounces of calorie-free liquids did you consume each day? 64 oz    How many meal replacements did you take each day?  4    Did you have any problems adhering to the program?  no If yes, please explain: Exercise  Aerobic exercise: 45 min  Resistance exercise: 0 workouts / week  Any discomfort?  no     If yes, where? Review of Systems  Complete ROS negative except where noted above    Objective  Visit Vitals    /74 (BP 1 Location: Right arm, BP Patient Position: Sitting)    Pulse 82    Temp 97.6 °F (36.4 °C) (Oral)    Resp 12    Ht 5' 1.42\" (1.56 m)    Wt 173 lb 3.2 oz (78.6 kg)    SpO2 98%    BMI 32.28 kg/m2     No LMP recorded. Patient has had a hysterectomy. No flowsheet data found. Waist Circumference: I personally reviewed patient's Weight Management Doc Flowsheet  Neck Circumference: I personally reviewed patient's Weight Management Doc Flowsheet  Percent Body Fat: I personally reviewed patient's Weight Management Doc Flowsheet    Physical Exam  Appearance: well appearing, obese, A&O, NAD  HEENT:  NC/AT, PERRL, No scleral icterus  Heart:  RRR without M/R/G  Lungs:  CTAB, no rhonchi, rales, or wheezes with good air exchange   Ext:  No LE Edema    Assessment / Plan    Encounter Diagnoses   Name Primary?  Obesity (BMI 35.0-39.9 without comorbidity) Yes    Hyperlipidemia, unspecified hyperlipidemia type        1. Weight management well controlled   Progress was reviewed with patient    2. Labs    Latest results reviewed with patient   Lab slip given to pt for f/up HDL labs    3. Diet regimen   # of meal replacements prescribed: 4   If modified LCD-nutritional guidelines:    Monthly Goal   As below    Medical monitoring schedule:   Weekly BP/Weight checks   Monthly provider appointments  I have reviewed/discussed the above normal BMI with the patient. I have recommended the following interventions: dietary management education, guidance, and counseling, monitor weight and prescribed dietary intake . .      Ms. Ronaldo French has a reminder for a \"due or due soon\" health maintenance.  I have asked that she contact her primary care provider for follow-up on this health maintenance. Patient Instructions     Health Maintenance Due   Topic Date Due    DTaP/Tdap/Td series (1 - Tdap) 02/14/1994    PAP AKA CERVICAL CYTOLOGY  02/14/1994    Influenza Age 5 to Adult  08/01/2018     Monthly goal:    8-12 lbs weight loss         Body Mass Index: Care Instructions  Your Care Instructions    Body mass index (BMI) can help you see if your weight is raising your risk for health problems. It uses a formula to compare how much you weigh with how tall you are. · A BMI lower than 18.5 is considered underweight. · A BMI between 18.5 and 24.9 is considered healthy. · A BMI between 25 and 29.9 is considered overweight. A BMI of 30 or higher is considered obese. If your BMI is in the normal range, it means that you have a lower risk for weight-related health problems. If your BMI is in the overweight or obese range, you may be at increased risk for weight-related health problems, such as high blood pressure, heart disease, stroke, arthritis or joint pain, and diabetes. If your BMI is in the underweight range, you may be at increased risk for health problems such as fatigue, lower protection (immunity) against illness, muscle loss, bone loss, hair loss, and hormone problems. BMI is just one measure of your risk for weight-related health problems. You may be at higher risk for health problems if you are not active, you eat an unhealthy diet, or you drink too much alcohol or use tobacco products. Follow-up care is a key part of your treatment and safety. Be sure to make and go to all appointments, and call your doctor if you are having problems. It's also a good idea to know your test results and keep a list of the medicines you take. How can you care for yourself at home? · Practice healthy eating habits. This includes eating plenty of fruits, vegetables, whole grains, lean protein, and low-fat dairy. · If your doctor recommends it, get more exercise. Walking is a good choice.  Bit by bit, increase the amount you walk every day. Try for at least 30 minutes on most days of the week. · Do not smoke. Smoking can increase your risk for health problems. If you need help quitting, talk to your doctor about stop-smoking programs and medicines. These can increase your chances of quitting for good. · Limit alcohol to 2 drinks a day for men and 1 drink a day for women. Too much alcohol can cause health problems. If you have a BMI higher than 25  · Your doctor may do other tests to check your risk for weight-related health problems. This may include measuring the distance around your waist. A waist measurement of more than 40 inches in men or 35 inches in women can increase the risk of weight-related health problems. · Talk with your doctor about steps you can take to stay healthy or improve your health. You may need to make lifestyle changes to lose weight and stay healthy, such as changing your diet and getting regular exercise. If you have a BMI lower than 18.5  · Your doctor may do other tests to check your risk for health problems. · Talk with your doctor about steps you can take to stay healthy or improve your health. You may need to make lifestyle changes to gain or maintain weight and stay healthy, such as getting more healthy foods in your diet and doing exercises to build muscle. Where can you learn more? Go to http://elvie-yi.info/. Enter S176 in the search box to learn more about \"Body Mass Index: Care Instructions. \"  Current as of: October 13, 2016  Content Version: 11.4  © 8368-7040 Healthwise, [employer]. Care instructions adapted under license by [employer] (which disclaims liability or warranty for this information). If you have questions about a medical condition or this instruction, always ask your healthcare professional. [place] [address] any warranty or liability for your use of this information.           Follow-up Disposition:  Return in about 4 weeks (around 8/30/2018). 10 minutes of the 15 minutes face to face time with Ifrah Villarreal consisted of counseling & coordinating and/or discussing treatment plans in reference to her The primary encounter diagnosis was Obesity (BMI 35.0-39.9 without comorbidity). A diagnosis of Hyperlipidemia, unspecified hyperlipidemia type was also pertinent to this visit. The patient is to follow up as scheduled and will report to the ED or the office if symptoms change or increase. The patient has voiced understanding and will comply.

## 2018-08-02 NOTE — PROGRESS NOTES
Chief Complaint   Patient presents with    Weight Management     1. Have you been to the ER, urgent care clinic since your last visit? Hospitalized since your last visit? No    2. Have you seen or consulted any other health care providers outside of the [address] [place] [address] since your last visit? Include any pap smears or colon screening.  No

## 2018-08-02 NOTE — MR AVS SNAPSHOT
[address] 
[phone] Patient: Quintin Reyes MRN: [id number] MCK:[id number] Visit Information Date & Time Provider Department Dept. Phone Encounter #  
 8/2/2018  9:30 AM Jamila Doty NP [facility] [id number] Follow-up Instructions Return in about 4 weeks (around 8/30/2018). Upcoming Health Maintenance Date Due DTaP/Tdap/Td series (1 - Tdap) 2/14/1994 PAP AKA CERVICAL CYTOLOGY 2/14/1994 Influenza Age 5 to Adult 8/1/2018 Allergies as of 8/2/2018  Review Complete On: 8/2/2018 By: Jamila Doty NP Severity Noted Reaction Type Reactions Seafood High 03/21/2014    Hives, Swelling Iodine Medium 03/21/2014    Other (comments) Nsaids (Non-steroidal Anti-inflammatory Drug)  03/21/2014    Other (comments) Shellfish Derived  08/27/2014    Angioedema Current Immunizations  Never Reviewed No immunizations on file. Not reviewed this visit You Were Diagnosed With   
  
 Codes Comments Obesity (BMI 35.0-39.9 without comorbidity)    -  Primary ICD-10-CM: R35.9 ICD-9-CM: 278.00 Hyperlipidemia, unspecified hyperlipidemia type     ICD-10-CM: E78.5 ICD-9-CM: 272.4 Vitals BP Pulse Temp Resp Height(growth percentile) Weight(growth percentile) 108/74 (BP 1 Location: Right arm, BP Patient Position: Sitting) 82 97.6 °F (36.4 °C) (Oral) 12 5' 1.42\" (1.56 m) 173 lb 3.2 oz (78.6 kg) SpO2 BMI OB Status Smoking Status 98% 32.28 kg/m2 Hysterectomy Never Smoker Vitals History BMI and BSA Data Body Mass Index Body Surface Area  
 32.28 kg/m 2 1.85 m 2 Your Updated Medication List  
  
   
This list is accurate as of 8/2/18 10:09 AM.  Always use your most recent med list.  
  
  
  
  
 albuterol 90 mcg/actuation inhaler Commonly known as:  PROVENTIL HFA, VENTOLIN HFA, PROAIR HFA  
 1-2 Puffs. benzonatate 100 mg capsule Commonly known as:  TESSALON Take 1 capsule 3 times daily as needed for coughing, swallow capsules whole. butalbital-acetaminophen-caff -40 mg per capsule Commonly known as:  [employer] Take 1-2 Caps by mouth every six (6) hours as needed for Pain. Max Daily Amount: 8 Caps. cetirizine 10 mg tablet Commonly known as:  ZYRTEC 10 mg.  
  
 COQ10  PO Take  by mouth daily. CULTURELLE 15 billion cell capsule Generic drug:  lactobacillus rhamnosus gg 15 billion cell Take 1 Cap by mouth daily. EMERGEN-C 1,000 mg Pwep Generic drug:  Ascorbic Acid-Multivits-Min Take  by mouth daily. FIBER (PSYLLIUM HUSK) PO Take  by mouth daily. fish oil-omega-3 fatty acids 340-1,000 mg capsule Take 1 Cap by mouth daily. hydroCHLOROthiazide 25 mg tablet Commonly known as:  HYDRODIURIL Take 25 mg by mouth daily. losartan 100 mg tablet Commonly known as:  COZAAR Take 100 mg by mouth daily. magnesium 250 mg Tab Take  by mouth daily. multivitamin tablet Commonly known as:  ONE A DAY Take 1 Tab by mouth daily. [address] Take  by mouth daily. potassium 99 mg tablet Take 99 mg by mouth two (2) times a day. simvastatin 10 mg tablet Commonly known as:  ZOCOR Take 20 mg by mouth nightly. VITAMIN B-6 PO Take  by mouth daily. VITAMIN C 500 mg tablet Generic drug:  ascorbic acid (vitamin C) Take 500 mg by mouth daily. zinc 50 mg Tab tablet Take 50 mg by mouth daily. Follow-up Instructions Return in about 4 weeks (around 8/30/2018). Patient Instructions Health Maintenance Due Topic Date Due  
 DTaP/Tdap/Td series (1 - Tdap) 02/14/1994  PAP AKA CERVICAL CYTOLOGY  02/14/1994  Influenza Age 5 to Adult  08/01/2018 Monthly goal: 
 
8-12 lbs weight loss Body Mass Index: Care Instructions Your Care Instructions Body mass index (BMI) can help you see if your weight is raising your risk for health problems. It uses a formula to compare how much you weigh with how tall you are. · A BMI lower than 18.5 is considered underweight. · A BMI between 18.5 and 24.9 is considered healthy. · A BMI between 25 and 29.9 is considered overweight. A BMI of 30 or higher is considered obese. If your BMI is in the normal range, it means that you have a lower risk for weight-related health problems. If your BMI is in the overweight or obese range, you may be at increased risk for weight-related health problems, such as high blood pressure, heart disease, stroke, arthritis or joint pain, and diabetes. If your BMI is in the underweight range, you may be at increased risk for health problems such as fatigue, lower protection (immunity) against illness, muscle loss, bone loss, hair loss, and hormone problems. BMI is just one measure of your risk for weight-related health problems. You may be at higher risk for health problems if you are not active, you eat an unhealthy diet, or you drink too much alcohol or use tobacco products. Follow-up care is a key part of your treatment and safety. Be sure to make and go to all appointments, and call your doctor if you are having problems. It's also a good idea to know your test results and keep a list of the medicines you take. How can you care for yourself at home? · Practice healthy eating habits. This includes eating plenty of fruits, vegetables, whole grains, lean protein, and low-fat dairy. · If your doctor recommends it, get more exercise. Walking is a good choice. Bit by bit, increase the amount you walk every day. Try for at least 30 minutes on most days of the week. · Do not smoke. Smoking can increase your risk for health problems.  If you need help quitting, talk to your doctor about stop-smoking programs and medicines. These can increase your chances of quitting for good. · Limit alcohol to 2 drinks a day for men and 1 drink a day for women. Too much alcohol can cause health problems. If you have a BMI higher than 25 · Your doctor may do other tests to check your risk for weight-related health problems. This may include measuring the distance around your waist. A waist measurement of more than 40 inches in men or 35 inches in women can increase the risk of weight-related health problems. · Talk with your doctor about steps you can take to stay healthy or improve your health. You may need to make lifestyle changes to lose weight and stay healthy, such as changing your diet and getting regular exercise. If you have a BMI lower than 18.5 · Your doctor may do other tests to check your risk for health problems. · Talk with your doctor about steps you can take to stay healthy or improve your health. You may need to make lifestyle changes to gain or maintain weight and stay healthy, such as getting more healthy foods in your diet and doing exercises to build muscle. Where can you learn more? Go to http://elvie-yi.info/. Enter S176 in the search box to learn more about \"Body Mass Index: Care Instructions. \" Current as of: October 13, 2016 Content Version: 11.4 © 2375-1891 Healthwise, Incorporated. Care instructions adapted under license by [employer] (which disclaims liability or warranty for this information). If you have questions about a medical condition or this instruction, always ask your healthcare professional. [place] [address] any warranty or liability for your use of this information. Introducing [facility]! [employer] introduces [employer] patient portal. Now you can access parts of your medical record, email your doctor's office, and request medication refills online.    
 
1. In your internet browser, go to [email or web address] 2. Click on the First Time User? Click Here link in the Sign In box. You will see the New Member Sign Up page. 3. Enter your Appfolio Access Code exactly as it appears below. You will not need to use this code after youve completed the sign-up process. If you do not sign up before the expiration date, you must request a new code. · Appfolio Access Code: [email or web address] Expires: 8/27/2018  2:48 PM 
 
4. Enter the last four digits of your Social Security Number (xxxx) and Date of Birth (mm/dd/yyyy) as indicated and click Submit. You will be taken to the next sign-up page. 5. Create a Appfolio ID. This will be your Appfolio login ID and cannot be changed, so think of one that is secure and easy to remember. 6. Create a Appfolio password. You can change your password at any time. 7. Enter your Password Reset Question and Answer. This can be used at a later time if you forget your password. 8. Enter your e-mail address. You will receive e-mail notification when new information is available in [address] 9. Click Sign Up. You can now view and download portions of your medical record. 10. Click the Download Summary menu link to download a portable copy of your medical information. If you have questions, please visit the Frequently Asked Questions section of the Appfolio website. Remember, Appfolio is NOT to be used for urgent needs. For medical emergencies, dial 911. Now available from your iPhone and Android! Please provide this summary of care documentation to your next provider. Your primary care clinician is listed as ABUNDIO GUEVARA. If you have any questions after today's visit, please call [phone].

## 2018-08-03 RX ORDER — GLUCOSAMINE SULFATE 1500 MG
3000 POWDER IN PACKET (EA) ORAL DAILY
COMMUNITY
End: 2019-06-14 | Stop reason: ALTCHOICE

## 2018-08-10 ENCOUNTER — CLINICAL SUPPORT (OUTPATIENT)
Dept: FAMILY MEDICINE CLINIC | Age: 43
End: 2018-08-10

## 2018-08-10 VITALS
HEART RATE: 75 BPM | DIASTOLIC BLOOD PRESSURE: 74 MMHG | SYSTOLIC BLOOD PRESSURE: 106 MMHG | HEIGHT: 64 IN | WEIGHT: 173.8 LBS | BODY MASS INDEX: 29.67 KG/M2

## 2018-08-10 DIAGNOSIS — E66.9 OBESITY (BMI 35.0-39.9 WITHOUT COMORBIDITY): Primary | ICD-10-CM

## 2018-08-10 NOTE — PROGRESS NOTES
Progress Note: Weekly Education Class in the [facility] Loss Program   Is there anything that you or the patient needs to let the [facility] Physician know about? no  Over the past week, have you experienced any side-effects? no    Dahlia Camara is a 37 y.o. female who is enrolled in [facility]    Visit Vitals    /74 (BP 1 Location: Right arm, BP Patient Position: Sitting)    Pulse 75    Ht 5' 4\" (1.626 m)    Wt 173 lb 12.8 oz (78.8 kg)    BMI 29.83 kg/m2     Weight Metrics 8/10/2018 8/2/2018 8/2/2018 7/26/2018 7/20/2018 7/20/2018 7/5/2018   Weight 173 lb 12.8 oz - 173 lb 3.2 oz 175 lb 1.6 oz - 176 lb 11.2 oz 179 lb 9.6 oz   Waist Measure Inches 31 31 - 32 31.5 - -   Exercise Mins/week - 45 - - - - -   Body Fat % - 37.5 - 37.2 37.5 - -   BMI 29.83 kg/m2 - 32.28 kg/m2 32.55 kg/m2 - 32.85 kg/m2 33.39 kg/m2         Have you received any other medical care this week? no  If yes, where and for what? Have you had any change in your medications since your last visit? no  If yes what? Did you have any problems adhering to the program last week? no  If yes, please explain:       Eating Habits Over Last Week:  Did you take in 64 oz of non-caloric fluids?  yes     Did you consume your 4 meal replacements each day? no eating 3 meal replacements per day, as well as Fish or Tuna on each day      Physical Activity Over the Past Week:    Aerobic exercise: 315 min  Resistance exercise: 315 min workouts / week

## 2018-08-24 ENCOUNTER — CLINICAL SUPPORT (OUTPATIENT)
Dept: FAMILY MEDICINE CLINIC | Age: 43
End: 2018-08-24

## 2018-08-24 VITALS
BODY MASS INDEX: 28.54 KG/M2 | HEIGHT: 64 IN | WEIGHT: 167.2 LBS | SYSTOLIC BLOOD PRESSURE: 106 MMHG | DIASTOLIC BLOOD PRESSURE: 75 MMHG | HEART RATE: 78 BPM

## 2018-08-24 DIAGNOSIS — E66.9 OBESITY (BMI 35.0-39.9 WITHOUT COMORBIDITY): Primary | ICD-10-CM

## 2018-08-24 NOTE — PROGRESS NOTES
Progress Note: Weekly Education Class in the [facility] Loss Program   Is there anything that you or the patient needs to let the [facility] Physician know about? no  Over the past week, have you experienced any side-effects? no    Rudi Rhoades is a 37 y.o. female who is enrolled in [facility] Loss Program    Visit Vitals    /75 (BP 1 Location: Right arm, BP Patient Position: Sitting)    Pulse 78    Ht 5' 4\" (1.626 m)    Wt 167 lb 3.2 oz (75.8 kg)    BMI 28.7 kg/m2     Weight Metrics 8/24/2018 8/10/2018 8/2/2018 8/2/2018 7/26/2018 7/20/2018 7/20/2018   Weight 167 lb 3.2 oz 173 lb 12.8 oz - 173 lb 3.2 oz 175 lb 1.6 oz - 176 lb 11.2 oz   Waist Measure Inches 31 31 31 - 32 31.5 -   Exercise Mins/week - - 45 - - - -   Body Fat % - - 37.5 - 37.2 37.5 -   BMI 28.7 kg/m2 29.83 kg/m2 - 32.28 kg/m2 32.55 kg/m2 - 32.85 kg/m2         Have you received any other medical care this week? yes  If yes, where and for what? Fingers smashed in the garage door    Have you had any change in your medications since your last visit? no  If yes what? Did you have any problems adhering to the program last week? yes  If yes, please explain: thank you for the discount card. I have been doing this program for 12 weeks, with constant progress and this has been the first discount card. I would like to talk about the Web Classes, Please contact me. Eating Habits Over Last Week:  Did you take in 64 oz of non-caloric fluids? yes     Did you consume your 4 meal replacements each day?  yes       Physical Activity Over the Past Week:    Aerobic exercise: 210 min  Resistance exercise: 0 workouts / week

## 2018-09-07 ENCOUNTER — TELEPHONE (OUTPATIENT)
Dept: FAMILY MEDICINE CLINIC | Age: 43
End: 2018-09-07

## 2018-09-07 NOTE — TELEPHONE ENCOUNTER
Attempted to return patient call 2 days in a row 9/5/18 and 9/6/18 at the number listed. The voicemail is not set up and there was no answer.

## 2018-09-21 ENCOUNTER — CLINICAL SUPPORT (OUTPATIENT)
Dept: FAMILY MEDICINE CLINIC | Age: 43
End: 2018-09-21

## 2018-09-21 VITALS
HEART RATE: 91 BPM | HEIGHT: 64 IN | DIASTOLIC BLOOD PRESSURE: 74 MMHG | BODY MASS INDEX: 28.31 KG/M2 | SYSTOLIC BLOOD PRESSURE: 104 MMHG | WEIGHT: 165.8 LBS

## 2018-09-21 DIAGNOSIS — E66.9 OBESITY, UNSPECIFIED CLASSIFICATION, UNSPECIFIED OBESITY TYPE, UNSPECIFIED WHETHER SERIOUS COMORBIDITY PRESENT: Primary | ICD-10-CM

## 2018-09-21 NOTE — PROGRESS NOTES
Progress Note: Weekly Medical Monitoring in the [facility]   
Is there anything that you or the patient needs to let the supervising provider know about? no 
 
Over the past week, have you experienced any side-effects? no 
 
Armen Eli is a 37 y.o. female who is enrolled in [facility] 
 
Armen Eli was prescribed the VLCD / LCD. Visit Vitals  /74 (BP 1 Location: Right arm, BP Patient Position: Sitting)  Pulse 91  
 Ht 5' 4\" (1.626 m)  Wt 75.2 kg (165 lb 12.8 oz)  BMI 28.46 kg/m2 Weight Metrics 9/21/2018 8/24/2018 8/10/2018 8/2/2018 8/2/2018 7/26/2018 7/20/2018 Weight 165 lb 12.8 oz 167 lb 3.2 oz 173 lb 12.8 oz - 173 lb 3.2 oz 175 lb 1.6 oz - Waist Measure Inches 30.5 31 31 31 - 32 31.5 Exercise Mins/week - - - 45 - - - Body Fat % - - - 37.5 - 37.2 37.5 BMI 28.46 kg/m2 28.7 kg/m2 29.83 kg/m2 - 32.28 kg/m2 32.55 kg/m2 - Have you received any other medical care this week? no  If yes, where and for what? Have you had any change in your medications since your last visit? no  If yes what? Did you have any problems adhering to the program last week? no  If yes, please explain:  
 
 
Eating Habits Over Last Week: 
Did you take in 64 oz of non-caloric fluids? yes Did you consume your prescribed meal replacement regimen each day? yes Physical Activity Over the Past Week: 
 
Aerobic exercise: 105 min Resistance exercise: 35 workouts / week

## 2018-10-05 ENCOUNTER — CLINICAL SUPPORT (OUTPATIENT)
Dept: FAMILY MEDICINE CLINIC | Age: 43
End: 2018-10-05

## 2018-10-05 VITALS
HEART RATE: 81 BPM | HEIGHT: 64 IN | SYSTOLIC BLOOD PRESSURE: 103 MMHG | BODY MASS INDEX: 28.17 KG/M2 | DIASTOLIC BLOOD PRESSURE: 68 MMHG | WEIGHT: 165 LBS

## 2018-10-05 DIAGNOSIS — E66.9 OBESITY, UNSPECIFIED CLASSIFICATION, UNSPECIFIED OBESITY TYPE, UNSPECIFIED WHETHER SERIOUS COMORBIDITY PRESENT: Primary | ICD-10-CM

## 2018-10-05 NOTE — PROGRESS NOTES
Progress Note: Weekly Medical Monitoring in the [facility]    Is there anything that you or the patient needs to let the supervising provider know about? no    Over the past week, have you experienced any side-effects? no    Jie Chamorro is a 37 y.o. female who is enrolled in [facility]    Jie Chamorro was prescribed the VLCD / LCD. Visit Vitals    /68    Pulse 81    Ht 5' 4\" (1.626 m)    Wt 74.8 kg (165 lb)    BMI 28.32 kg/m2     Weight Metrics 10/5/2018 9/21/2018 8/24/2018 8/10/2018 8/2/2018 8/2/2018 7/26/2018   Weight 165 lb 165 lb 12.8 oz 167 lb 3.2 oz 173 lb 12.8 oz - 173 lb 3.2 oz 175 lb 1.6 oz   Waist Measure Inches 32.25 30.5 31 31 31 - 32   Exercise Mins/week - - - - 45 - -   Body Fat % - - - - 37.5 - 37.2   BMI 28.32 kg/m2 28.46 kg/m2 28.7 kg/m2 29.83 kg/m2 - 32.28 kg/m2 32.55 kg/m2         Have you received any other medical care this week? no  If yes, where and for what? Have you had any change in your medications since your last visit? no  If yes what? Did you have any problems adhering to the program last week? no  If yes, please explain:       Eating Habits Over Last Week:  Did you take in 64 oz of non-caloric fluids?  yes     Did you consume your prescribed meal replacement regimen each day? no       Physical Activity Over the Past Week:    Aerobic exercise: daily did not specify minutes min  Resistance exercise: 0 workouts / week

## 2018-10-18 ENCOUNTER — OFFICE VISIT (OUTPATIENT)
Dept: SURGERY | Age: 43
End: 2018-10-18

## 2018-10-18 VITALS
WEIGHT: 166.4 LBS | TEMPERATURE: 98.5 F | HEIGHT: 64 IN | SYSTOLIC BLOOD PRESSURE: 110 MMHG | HEART RATE: 67 BPM | DIASTOLIC BLOOD PRESSURE: 72 MMHG | RESPIRATION RATE: 16 BRPM | OXYGEN SATURATION: 98 % | BODY MASS INDEX: 28.41 KG/M2

## 2018-10-18 DIAGNOSIS — E66.9 OBESITY (BMI 35.0-39.9 WITHOUT COMORBIDITY): ICD-10-CM

## 2018-10-18 DIAGNOSIS — E55.9 VITAMIN D DEFICIENCY: ICD-10-CM

## 2018-10-18 DIAGNOSIS — Z01.812 BLOOD TESTS PRIOR TO TREATMENT OR PROCEDURE: ICD-10-CM

## 2018-10-18 DIAGNOSIS — E66.9 OBESITY (BMI 35.0-39.9 WITHOUT COMORBIDITY): Primary | ICD-10-CM

## 2018-10-18 DIAGNOSIS — R63.4 RAPID WEIGHT LOSS: ICD-10-CM

## 2018-10-18 NOTE — PROGRESS NOTES
[facility] Progress Note:   F/up Provider Visit    CC: Weight Management    Africa Arreaga is a 37 y.o. female who is here for her  f/up medical provider visit for the VLCD Program. she did attend class last week. Still taking HCTZ PRN. Not feeling hungry. Eats salmon and tuna + 3 MR otherwise 4 MR Daily.   -7lb since last provider visit    Weight History  Weight Metrics 10/18/2018 10/18/2018 10/5/2018 10/5/2018 9/21/2018 8/24/2018 8/10/2018   Weight - 166 lb 6.4 oz - 165 lb 165 lb 12.8 oz 167 lb 3.2 oz 173 lb 12.8 oz   Waist Measure Inches 32 - 32.25 - 30.5 31 31   Exercise Mins/week - - - - - - -   Body Fat % - - - - - - -   BMI - 28.56 kg/m2 - 28.32 kg/m2 28.46 kg/m2 28.7 kg/m2 29.83 kg/m2       Starting wt: 191  Goal wt: 150 and if 145 will be happy   EKG due: 141  Ideal body weight: 54.7 kg (120 lb 9.5 oz)  Adjusted ideal body weight: 63 kg (138 lb 14.6 oz)  Body mass index is 28.56 kg/m². History    Past Medical History:   Diagnosis Date    Anxiety     Chronic kidney disease     Stage II    Depression     Hypertension     Personal history of kidney cancer        Past Surgical History:   Procedure Laterality Date    HX COLONOSCOPY      HX HYSTERECTOMY      HX NEPHRECTOMY Left     HX OOPHORECTOMY Right        Current Outpatient Medications   Medication Sig Dispense Refill    cholecalciferol (VITAMIN D3) 1,000 unit cap Take 3,000 Units by mouth daily.  albuterol (PROVENTIL HFA, VENTOLIN HFA, PROAIR HFA) 90 mcg/actuation inhaler 1-2 Puffs.  benzonatate (TESSALON) 100 mg capsule Take 1 capsule 3 times daily as needed for coughing, swallow capsules whole.  cetirizine (ZYRTEC) 10 mg tablet 10 mg.      ubidecarenone/vitamin E mixed (COQ10  PO) Take  by mouth daily.  L gasseri/B bifidum/B longum (Datamars PO) Take  by mouth daily.  ascorbic acid, vitamin C, (VITAMIN C) 500 mg tablet Take 500 mg by mouth daily.       multivitamin (ONE A DAY) tablet Take 1 Tab by mouth daily.  pyridoxine HCl, vitamin B6, (VITAMIN B-6 PO) Take  by mouth daily.  fish oil-omega-3 fatty acids 340-1,000 mg capsule Take 1 Cap by mouth daily.  FIBER, PSYLLIUM HUSK, PO Take  by mouth daily.  Ascorbic Acid-Multivits-Min (EMERGEN-C) 1,000 mg pwep Take  by mouth daily.  magnesium 250 mg tab Take  by mouth daily.  zinc 50 mg tab tablet Take 50 mg by mouth daily.  lactobacillus rhamnosus gg 15 billion cell (CULTURELLE) 15 billion cell capsule Take 1 Cap by mouth daily.  potassium 99 mg tablet Take 99 mg by mouth two (2) times a day.  butalbital-acetaminophen-caff (FIORICET) -40 mg per capsule Take 1-2 Caps by mouth every six (6) hours as needed for Pain. Max Daily Amount: 8 Caps. 20 Cap 0    simvastatin (ZOCOR) 10 mg tablet Take 20 mg by mouth nightly.  losartan (COZAAR) 100 mg tablet Take 100 mg by mouth daily.  hydrochlorothiazide (HYDRODIURIL) 25 mg tablet Take 25 mg by mouth daily. Allergies   Allergen Reactions    Seafood Hives and Swelling    Iodine Other (comments)    Nsaids (Non-Steroidal Anti-Inflammatory Drug) Other (comments)    Shellfish Derived Angioedema       Social History     Tobacco Use    Smoking status: Never Smoker    Smokeless tobacco: Never Used   Substance Use Topics    Alcohol use: Yes    Drug use: No       No family history on file. No family status information on file. Medications:  Outpatient Medications Marked as Taking for the 10/18/18 encounter (Office Visit) with Franco Park NP   Medication Sig Dispense Refill    cholecalciferol (VITAMIN D3) 1,000 unit cap Take 3,000 Units by mouth daily.  albuterol (PROVENTIL HFA, VENTOLIN HFA, PROAIR HFA) 90 mcg/actuation inhaler 1-2 Puffs.  benzonatate (TESSALON) 100 mg capsule Take 1 capsule 3 times daily as needed for coughing, swallow capsules whole.       cetirizine (ZYRTEC) 10 mg tablet 10 mg.      ubidecarenone/vitamin E mixed (COQ10  PO) Take  by mouth daily.  L gasseri/B bifidum/B longum ([email or web address] PO) Take  by mouth daily.  ascorbic acid, vitamin C, (VITAMIN C) 500 mg tablet Take 500 mg by mouth daily.  multivitamin (ONE A DAY) tablet Take 1 Tab by mouth daily.  pyridoxine HCl, vitamin B6, (VITAMIN B-6 PO) Take  by mouth daily.  fish oil-omega-3 fatty acids 340-1,000 mg capsule Take 1 Cap by mouth daily.  FIBER, PSYLLIUM HUSK, PO Take  by mouth daily.  Ascorbic Acid-Multivits-Min (EMERGEN-C) 1,000 mg pwep Take  by mouth daily.  magnesium 250 mg tab Take  by mouth daily.  zinc 50 mg tab tablet Take 50 mg by mouth daily.  lactobacillus rhamnosus gg 15 billion cell (CULTURELLE) 15 billion cell capsule Take 1 Cap by mouth daily.  potassium 99 mg tablet Take 99 mg by mouth two (2) times a day.  butalbital-acetaminophen-caff (FIORICET) -40 mg per capsule Take 1-2 Caps by mouth every six (6) hours as needed for Pain. Max Daily Amount: 8 Caps. 20 Cap 0    simvastatin (ZOCOR) 10 mg tablet Take 20 mg by mouth nightly.  losartan (COZAAR) 100 mg tablet Take 100 mg by mouth daily.  hydrochlorothiazide (HYDRODIURIL) 25 mg tablet Take 25 mg by mouth daily. Review of Systems  Review of Systems   Constitutional: Positive for weight loss. Negative for malaise/fatigue. Cardiovascular: Positive for leg swelling. Gastrointestinal: Positive for constipation. Musculoskeletal: Negative for joint pain. Neurological: Negative for weakness and headaches. Objective  Visit Vitals  /72   Pulse 67   Temp 98.5 °F (36.9 °C)   Resp 16   Ht 5' 4\" (1.626 m)   Wt 75.5 kg (166 lb 6.4 oz)   SpO2 98%   BMI 28.56 kg/m²     No LMP recorded. Patient has had a hysterectomy. Physical Exam  Physical Exam   Constitutional: She is oriented to person, place, and time.  She appears well-developed and well-nourished. HENT:   Head: Normocephalic. Cardiovascular: Normal rate and regular rhythm. Pulmonary/Chest: Effort normal and breath sounds normal.   Abdominal: Soft. Musculoskeletal: Normal range of motion. Neurological: She is alert and oriented to person, place, and time. Skin: Skin is warm and dry. Psychiatric: She has a normal mood and affect. Nursing note and vitals reviewed. Assessment / Plan    1. Weight management    Degree of control: Well-controlled, continues to lose weight, feels well on the diet, will likely approach goal in the next month   Progress was reviewed with patient. Goal(s) for next appointment:   -6-10lbs, will likely discuss transition to maintenance and schedule one-on-one registered dietitian appointment at next visit depending on how successful the patient is this month      2.  Labs    Labs not completed prior to visit encouraged patient to get labs done as soon as possible lab slip printed and given to patient for today's visit   Routine monitoring labs ordered, for next month's visit  Orders Placed This Encounter    METABOLIC PANEL, COMPREHENSIVE    URIC ACID    METABOLIC PANEL, COMPREHENSIVE    URIC ACID     Patient had numerous questions regarding insurance copayment for today's visit, did my best to answer patient's questions in regards to specialist visit versus primary care visit and the difference in her co-pays as indicated by her card.       >50% of 30 min visit spent counseling     SEAN Martinez

## 2018-10-18 NOTE — PROGRESS NOTES
Nursing Note for Medical Monitoring in the [facility]      Deniz Daigle is a 37 y.o. female who is enrolled in [facility]    Deniz Daigle was prescribed the VLCD / LCD. Did you have any problems adhering to the program last week? no  If yes, please explain:     Since your last visit, have you experienced any complications? no    Have you received any other medical care this week? no  If yes, where and for what? Have you had any change in your medications since your last visit? no  If yes what? Are you taking an appetite suppressant? no   If yes:  Do you need a refill? BP Readings from Last 3 Encounters:   10/05/18 103/68   09/21/18 104/74   08/24/18 106/75        Eating Habits Over Last Week:  Did you take in 64 oz of non-caloric fluids? yes     Did you consume your prescribed meal replacement regimen each day?  yes       Physical Activity Over the Past Week:    Aerobic exercise:  420min/ 1 hr 7 days a week   Resistance exercise:  workouts / week

## 2018-10-26 ENCOUNTER — CLINICAL SUPPORT (OUTPATIENT)
Dept: FAMILY MEDICINE CLINIC | Age: 43
End: 2018-10-26

## 2018-10-26 VITALS
WEIGHT: 165 LBS | SYSTOLIC BLOOD PRESSURE: 138 MMHG | DIASTOLIC BLOOD PRESSURE: 86 MMHG | HEIGHT: 64 IN | BODY MASS INDEX: 28.17 KG/M2

## 2018-10-26 DIAGNOSIS — E66.9 OBESITY, UNSPECIFIED CLASSIFICATION, UNSPECIFIED OBESITY TYPE, UNSPECIFIED WHETHER SERIOUS COMORBIDITY PRESENT: Primary | ICD-10-CM

## 2018-10-26 NOTE — PROGRESS NOTES
Chief Complaint Patient presents with  Weight Management Progress Note: Weekly Medical Monitoring in the [facility]   
Is there anything that you or the patient needs to let the supervising provider know about? no 
 
Over the past week, have you experienced any side-effects? no 
 
Vianca Flores is a 37 y.o. female who is enrolled in [facility] 
 
Vianca Flores was prescribed the VLCD / LCD. Visit Vitals /86 Ht 5' 4\" (1.626 m) Wt 165 lb (74.8 kg) BMI 28.32 kg/m² Weight Metrics 10/26/2018 10/18/2018 10/18/2018 10/5/2018 10/5/2018 9/21/2018 8/24/2018 Weight 165 lb - 166 lb 6.4 oz - 165 lb 165 lb 12.8 oz 167 lb 3.2 oz Waist Measure Inches 31 32 - 32.25 - 30.5 31 Exercise Mins/week - - - - - - - Body Fat % - - - - - - -  
BMI 28.32 kg/m2 - 28.56 kg/m2 - 28.32 kg/m2 28.46 kg/m2 28.7 kg/m2 Have you received any other medical care this week? no  If yes, where and for what? Have you had any change in your medications since your last visit? no  If yes what? Did you have any problems adhering to the program last week? no  If yes, please explain:  
 
 
Eating Habits Over Last Week: 
Did you take in 64 oz of non-caloric fluids? yes Did you consume your prescribed meal replacement regimen each day? no  
 
 
Physical Activity Over the Past Week: 
 
Aerobic exercise: 300 min Resistance exercise: no workouts / week

## 2018-11-02 ENCOUNTER — CLINICAL SUPPORT (OUTPATIENT)
Dept: FAMILY MEDICINE CLINIC | Age: 43
End: 2018-11-02

## 2018-11-02 VITALS
HEART RATE: 80 BPM | WEIGHT: 161 LBS | HEIGHT: 64 IN | BODY MASS INDEX: 27.49 KG/M2 | SYSTOLIC BLOOD PRESSURE: 104 MMHG | DIASTOLIC BLOOD PRESSURE: 72 MMHG

## 2018-11-02 DIAGNOSIS — E66.9 OBESITY, UNSPECIFIED CLASSIFICATION, UNSPECIFIED OBESITY TYPE, UNSPECIFIED WHETHER SERIOUS COMORBIDITY PRESENT: Primary | ICD-10-CM

## 2018-11-02 LAB
A-G RATIO,AGRAT: 1.4 RATIO (ref 1.1–2.6)
ALBUMIN SERPL-MCNC: 4.1 G/DL (ref 3.5–5)
ALP SERPL-CCNC: 40 U/L (ref 25–115)
ALT SERPL-CCNC: 14 U/L (ref 5–40)
ANION GAP SERPL CALC-SCNC: 12 MMOL/L
AST SERPL W P-5'-P-CCNC: 18 U/L (ref 10–37)
BILIRUB SERPL-MCNC: 1.3 MG/DL (ref 0.2–1.2)
BUN SERPL-MCNC: 13 MG/DL (ref 6–22)
CALCIUM SERPL-MCNC: 9.2 MG/DL (ref 8.4–10.5)
CHLORIDE SERPL-SCNC: 100 MMOL/L (ref 98–110)
CO2 SERPL-SCNC: 27 MMOL/L (ref 20–32)
CREAT SERPL-MCNC: 0.9 MG/DL (ref 0.5–1.2)
GFRAA, 66117: >60
GFRNA, 66118: >60
GLOBULIN,GLOB: 2.9 G/DL (ref 2–4)
GLUCOSE SERPL-MCNC: 90 MG/DL (ref 70–99)
POTASSIUM SERPL-SCNC: 3.7 MMOL/L (ref 3.5–5.5)
PROT SERPL-MCNC: 7 G/DL (ref 6.4–8.3)
SODIUM SERPL-SCNC: 139 MMOL/L (ref 133–145)
URATE SERPL-MCNC: 4.1 MG/DL (ref 2.2–7.7)

## 2018-11-02 NOTE — PROGRESS NOTES
Chief Complaint Patient presents with  Weight Management Progress Note: Weekly Medical Monitoring in the [facility]   
Is there anything that you or the patient needs to let the supervising provider know about? no 
 
Over the past week, have you experienced any side-effects? Yes headache Allison Jett is a 37 y.o. female who is enrolled in [facility] 
 
Allison Jett was prescribed the VLCD / LCD. Visit Vitals /72 Pulse 80 Ht 5' 4\" (1.626 m) Wt 161 lb (73 kg) BMI 27.64 kg/m² Weight Metrics 11/2/2018 10/26/2018 10/18/2018 10/18/2018 10/5/2018 10/5/2018 9/21/2018 Weight 161 lb 165 lb - 166 lb 6.4 oz - 165 lb 165 lb 12.8 oz Waist Measure Inches 31 31 32 - 32.25 - 30.5 Exercise Mins/week - - - - - - - Body Fat % - - - - - - -  
BMI 27.64 kg/m2 28.32 kg/m2 - 28.56 kg/m2 - 28.32 kg/m2 28.46 kg/m2 Have you received any other medical care this week? no  If yes, where and for what? Have you had any change in your medications since your last visit? no  If yes what? Did you have any problems adhering to the program last week? yes  If yes, please explain: cravings Eating Habits Over Last Week: 
Did you take in 64 oz of non-caloric fluids? yes Did you consume your prescribed meal replacement regimen each day? no  
 
 
Physical Activity Over the Past Week: 
 
Aerobic exercise: 210 min Resistance exercise: no workouts / week

## 2018-11-16 ENCOUNTER — CLINICAL SUPPORT (OUTPATIENT)
Dept: FAMILY MEDICINE CLINIC | Age: 43
End: 2018-11-16

## 2018-11-16 VITALS
SYSTOLIC BLOOD PRESSURE: 94 MMHG | BODY MASS INDEX: 27.86 KG/M2 | HEIGHT: 64 IN | WEIGHT: 163.2 LBS | HEART RATE: 71 BPM | DIASTOLIC BLOOD PRESSURE: 65 MMHG

## 2018-11-16 DIAGNOSIS — E66.9 OBESITY, UNSPECIFIED CLASSIFICATION, UNSPECIFIED OBESITY TYPE, UNSPECIFIED WHETHER SERIOUS COMORBIDITY PRESENT: Primary | ICD-10-CM

## 2018-11-16 NOTE — PROGRESS NOTES
Chief Complaint Patient presents with  Weight Management Progress Note: Weekly Medical Monitoring in the [facility]   
Is there anything that you or the patient needs to let the supervising provider know about? no 
 
Over the past week, have you experienced any side-effects? Yes cold intolerance\"heart racing 3am-5am morning time\" De Donnelly is a 37 y.o. female who is enrolled in [facility] 
 
De Donnelly was prescribed the VLCD / LCD. Visit Vitals BP 94/65 Pulse 71 Ht 5' 4\" (1.626 m) Wt 163 lb 3.2 oz (74 kg) BMI 28.01 kg/m² Weight Metrics 11/16/2018 11/2/2018 10/26/2018 10/18/2018 10/18/2018 10/5/2018 10/5/2018 Weight 163 lb 3.2 oz 161 lb 165 lb - 166 lb 6.4 oz - 165 lb Waist Measure Inches 30.5 31 31 32 - 32.25 - Exercise Mins/week - - - - - - - Body Fat % - - - - - - -  
BMI 28.01 kg/m2 27.64 kg/m2 28.32 kg/m2 - 28.56 kg/m2 - 28.32 kg/m2 Have you received any other medical care this week? yes  If yes, where and for what? \"I attended counseling yesterday ([facility])\" Have you had any change in your medications since your last visit? no  If yes what? Did you have any problems adhering to the program last week? yes  If yes, please explain: Keira Capps been drinking coconut water and Gatorade. \"  
 
Eating Habits Over Last Week: 
Did you take in 64 oz of non-caloric fluids? yes Did you consume your prescribed meal replacement regimen each day? yes Physical Activity Over the Past Week: 
 
Aerobic exercise: 175 min Resistance exercise: no workouts / week

## 2018-11-18 DIAGNOSIS — E66.9 OBESITY (BMI 35.0-39.9 WITHOUT COMORBIDITY): ICD-10-CM

## 2018-11-18 DIAGNOSIS — Z01.812 BLOOD TESTS PRIOR TO TREATMENT OR PROCEDURE: ICD-10-CM

## 2018-11-18 DIAGNOSIS — R63.4 RAPID WEIGHT LOSS: ICD-10-CM

## 2018-11-18 DIAGNOSIS — E55.9 VITAMIN D DEFICIENCY: ICD-10-CM

## 2018-11-26 LAB
A-G RATIO,AGRAT: 1.7 RATIO (ref 1.1–2.6)
ALBUMIN SERPL-MCNC: 4.2 G/DL (ref 3.5–5)
ALP SERPL-CCNC: 45 U/L (ref 25–115)
ALT SERPL-CCNC: 15 U/L (ref 5–40)
ANION GAP SERPL CALC-SCNC: 14 MMOL/L
AST SERPL W P-5'-P-CCNC: 16 U/L (ref 10–37)
BILIRUB SERPL-MCNC: 1.2 MG/DL (ref 0.2–1.2)
BUN SERPL-MCNC: 16 MG/DL (ref 6–22)
CALCIUM SERPL-MCNC: 9 MG/DL (ref 8.4–10.5)
CHLORIDE SERPL-SCNC: 100 MMOL/L (ref 98–110)
CO2 SERPL-SCNC: 26 MMOL/L (ref 20–32)
CREAT SERPL-MCNC: 1.1 MG/DL (ref 0.5–1.2)
GFRAA, 66117: >60
GFRNA, 66118: 51.5
GLOBULIN,GLOB: 2.5 G/DL (ref 2–4)
GLUCOSE SERPL-MCNC: 96 MG/DL (ref 70–99)
POTASSIUM SERPL-SCNC: 4 MMOL/L (ref 3.5–5.5)
PROT SERPL-MCNC: 6.7 G/DL (ref 6.4–8.3)
SODIUM SERPL-SCNC: 140 MMOL/L (ref 133–145)
URATE SERPL-MCNC: 3.7 MG/DL (ref 2.2–7.7)

## 2018-11-27 ENCOUNTER — OFFICE VISIT (OUTPATIENT)
Dept: SURGERY | Age: 43
End: 2018-11-27

## 2018-11-27 VITALS
DIASTOLIC BLOOD PRESSURE: 76 MMHG | HEIGHT: 64 IN | RESPIRATION RATE: 18 BRPM | BODY MASS INDEX: 28.24 KG/M2 | SYSTOLIC BLOOD PRESSURE: 106 MMHG | HEART RATE: 93 BPM | WEIGHT: 165.4 LBS | TEMPERATURE: 98.7 F

## 2018-11-27 DIAGNOSIS — R00.2 HEART PALPITATIONS: ICD-10-CM

## 2018-11-27 DIAGNOSIS — R63.4 RAPID WEIGHT LOSS: ICD-10-CM

## 2018-11-27 DIAGNOSIS — Z71.3 WEIGHT LOSS COUNSELING, ENCOUNTER FOR: ICD-10-CM

## 2018-11-27 DIAGNOSIS — E66.3 OVERWEIGHT: Primary | ICD-10-CM

## 2018-11-27 RX ORDER — DOCUSATE SODIUM 100 MG/1
100 CAPSULE, LIQUID FILLED ORAL 2 TIMES DAILY
COMMUNITY
End: 2021-01-18 | Stop reason: ALTCHOICE

## 2018-11-27 NOTE — PROGRESS NOTES
Nursing Note for Medical Monitoring in the [facility]      Eric Horne is a 37 y.o. female who is enrolled in [facility]    Eric Horne was prescribed the VLCD / LCD. Did you have any problems adhering to the program last week? yes  If yes, please explain: out of meal replacements    Since your last visit, have you experienced any complications? Yes  Increased palpitations  Have you received any other medical care this week? no  If yes, where and for what? Have you had any change in your medications since your last visit? no  If yes what? Are you taking an appetite suppressant? no   If yes:  Do you need a refill? BP Readings from Last 3 Encounters:   11/27/18 106/76   11/16/18 94/65   11/02/18 104/72        Eating Habits Over Last Week:  Did you take in 64 oz of non-caloric fluids?  yes     Did you consume your prescribed meal replacement regimen each day? no       Physical Activity Over the Past Week:    Aerobic exercise: 420 min  Resistance exercise: 0 workouts / week

## 2018-11-29 DIAGNOSIS — R63.4 RAPID WEIGHT LOSS: ICD-10-CM

## 2018-11-29 DIAGNOSIS — R00.2 HEART PALPITATIONS: ICD-10-CM

## 2018-11-29 DIAGNOSIS — E66.3 OVERWEIGHT: ICD-10-CM

## 2018-11-29 DIAGNOSIS — Z71.3 WEIGHT LOSS COUNSELING, ENCOUNTER FOR: ICD-10-CM

## 2018-11-29 NOTE — PROGRESS NOTES
[facility] Progress Note:   F/up Provider Visit    CC: Weight Management    Mabel Barrientos is a 37 y.o. female who is here for her  f/up medical provider visit for the VLCD Program. she did attend class last week. Still taking HCTZ PRN. Started no new meds. Not adhering to VLCD or LCD as prescribed. Struggling and eating late night snacks along with gluten rich items that she reports her previous MD asked her to avoid. Notes these types of foods and time of evening eating cause heart palpations. She has had these prior, sleeps on left side, nothing improves them except not eating after 8pm and avoiding gluten. Pt notes she has to avoid watching news and violent tv as it gives her anxiety and flash backs. Loss of family members around this time last year x2. Seeing counselor through job.     -1lbs since last visit. Weight History  Weight Metrics 11/27/2018 11/27/2018 11/16/2018 11/2/2018 10/26/2018 10/18/2018 10/18/2018   Weight - 165 lb 6.4 oz 163 lb 3.2 oz 161 lb 165 lb - 166 lb 6.4 oz   Waist Measure Inches 32 - 30.5 31 31 32 -   Exercise Mins/week - - - - - - -   Body Fat % - - - - - - -   BMI - 28.39 kg/m2 28.01 kg/m2 27.64 kg/m2 28.32 kg/m2 - 28.56 kg/m2       Starting wt: 191  Goal wt: 150 and if 145 will be happy   EKG due: 141  Ideal body weight: 54.7 kg (120 lb 9.5 oz)  Adjusted ideal body weight: 62.8 kg (138 lb 8.2 oz)  Body mass index is 28.39 kg/m².       History    Past Medical History:   Diagnosis Date    Anxiety     Cancer ([address]     kidney    Chronic kidney disease     Stage II    Depression     pt states anxiety    Hypertension     Personal history of kidney cancer        Past Surgical History:   Procedure Laterality Date    HX COLONOSCOPY      HX HYSTERECTOMY      HX NEPHRECTOMY Left     HX OOPHORECTOMY Right        Current Outpatient Medications   Medication Sig Dispense Refill    calcium-cholecalciferol, d3, (CALCIUM 600 + D) 600-125 mg-unit tab Take  by mouth.  docusate sodium (STOOL SOFTENER) 100 mg capsule Take 100 mg by mouth two (2) times a day.  cholecalciferol (VITAMIN D3) 1,000 unit cap Take 3,000 Units by mouth daily.  albuterol (PROVENTIL HFA, VENTOLIN HFA, PROAIR HFA) 90 mcg/actuation inhaler 1-2 Puffs.  cetirizine (ZYRTEC) 10 mg tablet 10 mg.      ubidecarenone/vitamin E mixed (COQ10  PO) Take  by mouth daily.  L gasseri/B bifidum/B longum ([employer] [place] PO) Take  by mouth daily.  ascorbic acid, vitamin C, (VITAMIN C) 500 mg tablet Take 500 mg by mouth daily.  multivitamin (ONE A DAY) tablet Take 1 Tab by mouth daily.  pyridoxine HCl, vitamin B6, (VITAMIN B-6 PO) Take  by mouth daily.  fish oil-omega-3 fatty acids 340-1,000 mg capsule Take 1 Cap by mouth daily.  FIBER, PSYLLIUM HUSK, PO Take  by mouth daily.  Ascorbic Acid-Multivits-Min (EMERGEN-C) 1,000 mg pwep Take  by mouth daily.  magnesium 250 mg tab Take  by mouth daily.  zinc 50 mg tab tablet Take 50 mg by mouth daily.  lactobacillus rhamnosus gg 15 billion cell (CULTURELLE) 15 billion cell capsule Take 1 Cap by mouth daily.  potassium 99 mg tablet Take 99 mg by mouth two (2) times a day.  butalbital-acetaminophen-caff (FIORICET) -40 mg per capsule Take 1-2 Caps by mouth every six (6) hours as needed for Pain. Max Daily Amount: 8 Caps. 20 Cap 0    simvastatin (ZOCOR) 10 mg tablet Take 20 mg by mouth nightly.  losartan (COZAAR) 100 mg tablet Take 100 mg by mouth daily.  hydrochlorothiazide (HYDRODIURIL) 25 mg tablet Take 25 mg by mouth daily.  benzonatate (TESSALON) 100 mg capsule Take 1 capsule 3 times daily as needed for coughing, swallow capsules whole.          Allergies   Allergen Reactions    Seafood Hives and Swelling    Iodine Other (comments)    Nsaids (Non-Steroidal Anti-Inflammatory Drug) Other (comments)    Shellfish Derived Angioedema Social History     Tobacco Use    Smoking status: Former Smoker     Last attempt to quit: 2011     Years since quittin.0    Smokeless tobacco: Never Used   Substance Use Topics    Alcohol use: Yes     Frequency: Monthly or less    Drug use: No       No family history on file. No family status information on file. Medications:  Outpatient Medications Marked as Taking for the 18 encounter (Office Visit) with Alicia Lee NP   Medication Sig Dispense Refill    calcium-cholecalciferol, d3, (CALCIUM 600 + D) 600-125 mg-unit tab Take  by mouth.  docusate sodium (STOOL SOFTENER) 100 mg capsule Take 100 mg by mouth two (2) times a day.  cholecalciferol (VITAMIN D3) 1,000 unit cap Take 3,000 Units by mouth daily.  albuterol (PROVENTIL HFA, VENTOLIN HFA, PROAIR HFA) 90 mcg/actuation inhaler 1-2 Puffs.  cetirizine (ZYRTEC) 10 mg tablet 10 mg.      ubidecarenone/vitamin E mixed (COQ10  PO) Take  by mouth daily.  L gasseri/B bifidum/B longum (PredictAd PO) Take  by mouth daily.  ascorbic acid, vitamin C, (VITAMIN C) 500 mg tablet Take 500 mg by mouth daily.  multivitamin (ONE A DAY) tablet Take 1 Tab by mouth daily.  pyridoxine HCl, vitamin B6, (VITAMIN B-6 PO) Take  by mouth daily.  fish oil-omega-3 fatty acids 340-1,000 mg capsule Take 1 Cap by mouth daily.  FIBER, PSYLLIUM HUSK, PO Take  by mouth daily.  Ascorbic Acid-Multivits-Min (EMERGEN-C) 1,000 mg pwep Take  by mouth daily.  magnesium 250 mg tab Take  by mouth daily.  zinc 50 mg tab tablet Take 50 mg by mouth daily.  lactobacillus rhamnosus gg 15 billion cell (CULTURELLE) 15 billion cell capsule Take 1 Cap by mouth daily.  potassium 99 mg tablet Take 99 mg by mouth two (2) times a day.  butalbital-acetaminophen-caff (FIORICET) -40 mg per capsule Take 1-2 Caps by mouth every six (6) hours as needed for Pain.  Max Daily Amount: 8 Caps. 20 Cap 0    simvastatin (ZOCOR) 10 mg tablet Take 20 mg by mouth nightly.  losartan (COZAAR) 100 mg tablet Take 100 mg by mouth daily.  hydrochlorothiazide (HYDRODIURIL) 25 mg tablet Take 25 mg by mouth daily. Review of Systems  Review of Systems   Constitutional: Positive for weight loss. Negative for chills, fever and malaise/fatigue. Eyes: Negative for blurred vision. Respiratory: Negative for shortness of breath. Cardiovascular: Positive for palpitations. Negative for chest pain, orthopnea and leg swelling. Gastrointestinal: Positive for constipation. Negative for abdominal pain, diarrhea, heartburn, nausea and vomiting. Musculoskeletal: Negative for joint pain. Neurological: Negative for dizziness, sensory change, weakness and headaches. Psychiatric/Behavioral: The patient is nervous/anxious. Objective  Visit Vitals  /76   Pulse 93   Temp 98.7 °F (37.1 °C)   Resp 18   Ht 5' 4\" (1.626 m)   Wt 75 kg (165 lb 6.4 oz)   BMI 28.39 kg/m²     No LMP recorded. Patient has had a hysterectomy. Physical Exam  Physical Exam   Constitutional: She is oriented to person, place, and time. She appears well-developed and well-nourished. HENT:   Head: Normocephalic. Cardiovascular: Normal rate and regular rhythm. Pulmonary/Chest: Effort normal and breath sounds normal.   Abdominal: Soft. Musculoskeletal: Normal range of motion. Neurological: She is alert and oriented to person, place, and time. Skin: Skin is warm and dry. Psychiatric: She has a normal mood and affect. Nursing note and vitals reviewed. Assessment / Plan    1. Weight management    Degree of control: poor control this month, feels Better on the diet then off, will likely approach goal in the next month   Progress was reviewed with patient.     Goal(s) for next appointment:   -4lb-10lbs, will likely discuss transition to maintenance and schedule one-on-one registered dietitian appointment at next visit depending on how successful the patient is this month      2.  Labs      Labs reviewed  Orders Placed This Encounter    calcium-cholecalciferol, d3, (CALCIUM 600 + D) 600-125 mg-unit tab     Sig: Take  by mouth.  docusate sodium (STOOL SOFTENER) 100 mg capsule     Sig: Take 100 mg by mouth two (2) times a day. Orders Placed This Encounter    METABOLIC PANEL, COMPREHENSIVE     Standing Status:   Future     Standing Expiration Date:   11/30/2019    URIC ACID     Standing Status:   Future     Standing Expiration Date:   11/30/2019    REFERRAL TO CARDIOLOGY     Referral Priority:   Routine     Referral Type:   Consultation     Referral Reason:   Specialty Services Required     Referred to Provider:   Kelly Candelario DO     Requested Specialty:   Cardiology     Number of Visits Requested:   1    calcium-cholecalciferol, d3, (CALCIUM 600 + D) 600-125 mg-unit tab     Sig: Take  by mouth.  docusate sodium (STOOL SOFTENER) 100 mg capsule     Sig: Take 100 mg by mouth two (2) times a day.      Follow up 1 mo or PRN   Follow up for cardiac eval of palpations      >50% of 30 min visit spent counseling     SEAN Castro

## 2018-12-07 ENCOUNTER — TELEPHONE (OUTPATIENT)
Dept: SURGERY | Age: 43
End: 2018-12-07

## 2018-12-07 ENCOUNTER — CLINICAL SUPPORT (OUTPATIENT)
Dept: FAMILY MEDICINE CLINIC | Age: 43
End: 2018-12-07

## 2018-12-07 VITALS
BODY MASS INDEX: 27.62 KG/M2 | HEART RATE: 90 BPM | WEIGHT: 161.8 LBS | SYSTOLIC BLOOD PRESSURE: 112 MMHG | HEIGHT: 64 IN | DIASTOLIC BLOOD PRESSURE: 88 MMHG

## 2018-12-07 DIAGNOSIS — E66.9 OBESITY, UNSPECIFIED CLASSIFICATION, UNSPECIFIED OBESITY TYPE, UNSPECIFIED WHETHER SERIOUS COMORBIDITY PRESENT: Primary | ICD-10-CM

## 2018-12-07 NOTE — TELEPHONE ENCOUNTER
Patient called while she was at cardiac doctor office at [facility] had me send referral for cardiac to [phone] she mad her own appointment and will f/u after this appointment with Michael ROSAS

## 2018-12-07 NOTE — PROGRESS NOTES
Chief Complaint Patient presents with  Weight Management Progress Note: Weekly Medical Monitoring in the [facility]   
Is there anything that you or the patient needs to let the supervising provider know about? no 
 
Over the past week, have you experienced any side-effects? no 
 
Jaspreet Johnson is a 37 y.o. female who is enrolled in [facility] 
 
Jaspreet Johnson was prescribed the VLCD / LCD. Visit Vitals /88 Pulse 90 Ht 5' 4\" (1.626 m) Wt 161 lb 12.8 oz (73.4 kg) BMI 27.77 kg/m² Weight Metrics 12/7/2018 12/7/2018 11/27/2018 11/27/2018 11/16/2018 11/2/2018 10/26/2018 Weight - 161 lb 12.8 oz - 165 lb 6.4 oz 163 lb 3.2 oz 161 lb 165 lb Waist Measure Inches 30.5 - 32 - 30.5 31 31 Exercise Mins/week - - - - - - - Body Fat % - - - - - - -  
BMI - 27.77 kg/m2 - 28.39 kg/m2 28.01 kg/m2 27.64 kg/m2 28.32 kg/m2 Have you received any other medical care this week? no  If yes, where and for what? Have you had any change in your medications since your last visit? no  If yes what? Did you have any problems adhering to the program last week? no  If yes, please explain:  
 
 
Eating Habits Over Last Week: 
Did you take in 64 oz of non-caloric fluids? yes Did you consume your prescribed meal replacement regimen each day? no  
 
 
Physical Activity Over the Past Week: 
 
Aerobic exercise: 150 min Resistance exercise: no workouts / week

## 2018-12-10 ENCOUNTER — TELEPHONE (OUTPATIENT)
Dept: CARDIOLOGY CLINIC | Age: 43
End: 2018-12-10

## 2018-12-10 NOTE — TELEPHONE ENCOUNTER
Called patient to schedule NP w/Dr. Amor Jorge for Please evaluate patient for palpitations appt but mailbox has not been set up yet

## 2018-12-21 ENCOUNTER — CLINICAL SUPPORT (OUTPATIENT)
Dept: FAMILY MEDICINE CLINIC | Age: 43
End: 2018-12-21

## 2018-12-21 VITALS
BODY MASS INDEX: 27.4 KG/M2 | HEIGHT: 64 IN | HEART RATE: 70 BPM | DIASTOLIC BLOOD PRESSURE: 66 MMHG | WEIGHT: 160.5 LBS | SYSTOLIC BLOOD PRESSURE: 104 MMHG

## 2018-12-21 DIAGNOSIS — E66.9 OBESITY, UNSPECIFIED CLASSIFICATION, UNSPECIFIED OBESITY TYPE, UNSPECIFIED WHETHER SERIOUS COMORBIDITY PRESENT: Primary | ICD-10-CM

## 2018-12-21 NOTE — PROGRESS NOTES
Progress Note: Weekly Medical Monitoring in the [facility]    Is there anything that you or the patient needs to let the supervising provider know about? no    Over the past week, have you experienced any side-effects? no    Mabel Barrientos is a 37 y.o. female who is enrolled in [facility]    Mabel Barrientos was prescribed the VLCD / LCD. Visit Vitals  /66   Pulse 70   Ht 5' 4\" (1.626 m)   Wt 72.8 kg (160 lb 8 oz)   BMI 27.55 kg/m²     Weight Metrics 12/21/2018 12/7/2018 12/7/2018 11/27/2018 11/27/2018 11/16/2018 11/2/2018   Weight 160 lb 8 oz - 161 lb 12.8 oz - 165 lb 6.4 oz 163 lb 3.2 oz 161 lb   Waist Measure Inches 30.25 30.5 - 32 - 30.5 31   Exercise Mins/week - - - - - - -   Body Fat % - - - - - - -   BMI 27.55 kg/m2 - 27.77 kg/m2 - 28.39 kg/m2 28.01 kg/m2 27.64 kg/m2         Have you received any other medical care this week? no  If yes, where and for what? Have you had any change in your medications since your last visit? no If yes what? Did you have any problems adhering to the program last week? no  If yes, please explain:       Eating Habits Over Last Week:  Did you take in 64 oz of non-caloric fluids? yes     Did you consume your prescribed meal replacement regimen each day?  yes       Physical Activity Over the Past Week:    Aerobic exercise: 210 min  Resistance exercise: 7 workouts / week

## 2019-01-04 ENCOUNTER — TELEPHONE (OUTPATIENT)
Dept: FAMILY MEDICINE CLINIC | Age: 44
End: 2019-01-04

## 2019-01-04 NOTE — TELEPHONE ENCOUNTER
Patient was no show for provider followup today with [facility] program. Attempted to contact to confirm enrollment. No voicemail setup, unable to leave message.

## 2019-01-11 ENCOUNTER — CLINICAL SUPPORT (OUTPATIENT)
Dept: FAMILY MEDICINE CLINIC | Age: 44
End: 2019-01-11

## 2019-01-11 VITALS
HEIGHT: 64 IN | BODY MASS INDEX: 27.35 KG/M2 | DIASTOLIC BLOOD PRESSURE: 74 MMHG | HEART RATE: 88 BPM | WEIGHT: 160.2 LBS | SYSTOLIC BLOOD PRESSURE: 106 MMHG

## 2019-01-11 DIAGNOSIS — E66.9 OBESITY, UNSPECIFIED CLASSIFICATION, UNSPECIFIED OBESITY TYPE, UNSPECIFIED WHETHER SERIOUS COMORBIDITY PRESENT: Primary | ICD-10-CM

## 2019-01-11 NOTE — PROGRESS NOTES
Chief Complaint Patient presents with  Weight Management Progress Note: Weekly Medical Monitoring in the [facility]   
Is there anything that you or the patient needs to let the supervising provider know about? NO Over the past week, have you experienced any side-effects? Yes \"nothing listed by patient\" Elizabeth Larson is a 37 y.o. female who is enrolled in [facility] 
 
Elizabeth Larson was prescribed the VLCD / LCD. Visit Vitals /74 Pulse 88 Ht 5' 4\" (1.626 m) Wt 160 lb 3.2 oz (72.7 kg) BMI 27.50 kg/m² Weight Metrics 1/11/2019 12/21/2018 12/7/2018 12/7/2018 11/27/2018 11/27/2018 11/16/2018 Weight 160 lb 3.2 oz 160 lb 8 oz - 161 lb 12.8 oz - 165 lb 6.4 oz 163 lb 3.2 oz Waist Measure Inches 31 30.25 30.5 - 32 - 30.5 Exercise Mins/week - - - - - - - Body Fat % - - - - - - -  
BMI 27.5 kg/m2 27.55 kg/m2 - 27.77 kg/m2 - 28.39 kg/m2 28.01 kg/m2 Have you received any other medical care this week? yes  If yes, where and for what? \"Sentara Ultra sound of stomach area\" Have you had any change in your medications since your last visit? yes  If yes what? \"Cardio cut blood pressure pill in half\" Did you have any problems adhering to the program last week? no  If yes, please explain:  
 
 
Eating Habits Over Last Week: 
Did you take in 64 oz of non-caloric fluids? yes Did you consume your prescribed meal replacement regimen each day? no  
 
 
Physical Activity Over the Past Week: 
 
Aerobic exercise: 210 min Resistance exercise: 70 minutes this week

## 2019-01-18 ENCOUNTER — CLINICAL SUPPORT (OUTPATIENT)
Dept: FAMILY MEDICINE CLINIC | Age: 44
End: 2019-01-18

## 2019-01-18 VITALS
HEART RATE: 78 BPM | DIASTOLIC BLOOD PRESSURE: 82 MMHG | BODY MASS INDEX: 27.68 KG/M2 | SYSTOLIC BLOOD PRESSURE: 118 MMHG | WEIGHT: 162.1 LBS | HEIGHT: 64 IN

## 2019-01-18 DIAGNOSIS — E66.9 OBESITY, UNSPECIFIED CLASSIFICATION, UNSPECIFIED OBESITY TYPE, UNSPECIFIED WHETHER SERIOUS COMORBIDITY PRESENT: Primary | ICD-10-CM

## 2019-01-18 NOTE — PROGRESS NOTES
Chief Complaint   Patient presents with    Weight Management     Progress Note: Weekly Medical Monitoring in the [facility] Program    Is there anything that you or the patient needs to let the supervising provider know about? no    Over the past week, have you experienced any side-effects? Yes \"random eye twitching\"    Stevan Gómez is a 37 y.o. female who is enrolled in [facility]    Stevan Gómez was prescribed the VLCD / LCD. Visit Vitals  /82   Pulse 78   Ht 5' 4\" (1.626 m)   Wt 162 lb 1.6 oz (73.5 kg)   BMI 27.82 kg/m²     Weight Metrics 1/18/2019 1/11/2019 12/21/2018 12/7/2018 12/7/2018 11/27/2018 11/27/2018   Weight 162 lb 1.6 oz 160 lb 3.2 oz 160 lb 8 oz - 161 lb 12.8 oz - 165 lb 6.4 oz   Waist Measure Inches 30.5 31 30.25 30.5 - 32 -   Exercise Mins/week - - - - - - -   Body Fat % - - - - - - -   BMI 27.82 kg/m2 27.5 kg/m2 27.55 kg/m2 - 27.77 kg/m2 - 28.39 kg/m2         Have you received any other medical care this week? n  If yes, where and for what? Have you had any change in your medications since your last visit? no  If yes what? Did you have any problems adhering to the program last week? no  If yes, please explain:       Eating Habits Over Last Week:  Did you take in 64 oz of non-caloric fluids?  no     Did you consume your prescribed meal replacement regimen each day? no       Physical Activity Over the Past Week:    Aerobic exercise: 210 min  Resistance exercise: 35 mins workouts / week

## 2019-01-25 ENCOUNTER — OFFICE VISIT (OUTPATIENT)
Dept: SURGERY | Age: 44
End: 2019-01-25

## 2019-01-25 ENCOUNTER — CLINICAL SUPPORT (OUTPATIENT)
Dept: FAMILY MEDICINE CLINIC | Age: 44
End: 2019-01-25

## 2019-01-25 VITALS
SYSTOLIC BLOOD PRESSURE: 112 MMHG | HEIGHT: 64 IN | HEART RATE: 78 BPM | WEIGHT: 159.8 LBS | BODY MASS INDEX: 27.28 KG/M2 | DIASTOLIC BLOOD PRESSURE: 80 MMHG

## 2019-01-25 DIAGNOSIS — E66.9 OBESITY, UNSPECIFIED CLASSIFICATION, UNSPECIFIED OBESITY TYPE, UNSPECIFIED WHETHER SERIOUS COMORBIDITY PRESENT: Primary | ICD-10-CM

## 2019-01-25 LAB
A-G RATIO,AGRAT: 1.8 RATIO (ref 1.1–2.6)
ALBUMIN SERPL-MCNC: 4.2 G/DL (ref 3.5–5)
ALP SERPL-CCNC: 41 U/L (ref 25–115)
ALT SERPL-CCNC: 21 U/L (ref 5–40)
ANION GAP SERPL CALC-SCNC: 11 MMOL/L
AST SERPL W P-5'-P-CCNC: 24 U/L (ref 10–37)
BILIRUB SERPL-MCNC: 0.5 MG/DL (ref 0.2–1.2)
BUN SERPL-MCNC: 15 MG/DL (ref 6–22)
CALCIUM SERPL-MCNC: 9.1 MG/DL (ref 8.4–10.5)
CHLORIDE SERPL-SCNC: 98 MMOL/L (ref 98–110)
CO2 SERPL-SCNC: 30 MMOL/L (ref 20–32)
CREAT SERPL-MCNC: 0.9 MG/DL (ref 0.5–1.2)
GFRAA, 66117: >60
GFRNA, 66118: >60
GLOBULIN,GLOB: 2.4 G/DL (ref 2–4)
GLUCOSE SERPL-MCNC: 91 MG/DL (ref 70–99)
POTASSIUM SERPL-SCNC: 3.8 MMOL/L (ref 3.5–5.5)
PROT SERPL-MCNC: 6.6 G/DL (ref 6.4–8.3)
SODIUM SERPL-SCNC: 139 MMOL/L (ref 133–145)
URATE SERPL-MCNC: 3.4 MG/DL (ref 2.2–7.7)

## 2019-01-25 NOTE — PROGRESS NOTES
Progress Note: Weekly Medical Monitoring in the [facility]   
Is there anything that you or the patient needs to let the supervising provider know about? no 
 
Over the past week, have you experienced any side-effects? no 
 
Tamy Duffy is a 37 y.o. female who is enrolled in [facility] 
 
Tamy Duffy was prescribed the VLCD / LCD. Visit Vitals /80 Pulse 78 Ht 5' 4\" (1.626 m) Wt 72.5 kg (159 lb 12.8 oz) BMI 27.43 kg/m² Weight Metrics 1/25/2019 1/18/2019 1/11/2019 12/21/2018 12/7/2018 12/7/2018 11/27/2018 Weight 159 lb 12.8 oz 162 lb 1.6 oz 160 lb 3.2 oz 160 lb 8 oz - 161 lb 12.8 oz - Waist Measure Inches 30.5 30.5 31 30.25 30.5 - 32 Exercise Mins/week - - - - - - - Body Fat % - - - - - - -  
BMI 27.43 kg/m2 27.82 kg/m2 27.5 kg/m2 27.55 kg/m2 - 27.77 kg/m2 - Have you received any other medical care this week? no  If yes, where and for what? Have you had any change in your medications since your last visit? no  If yes what? Did you have any problems adhering to the program last week? no  If yes, please explain:  
 
 
Eating Habits Over Last Week: 
Did you take in 64 oz of non-caloric fluids? yes Did you consume your prescribed meal replacement regimen each day? yes Physical Activity Over the Past Week: 
 
Aerobic exercise: 180 min Resistance exercise: 0 workouts / week

## 2019-02-01 ENCOUNTER — OFFICE VISIT (OUTPATIENT)
Dept: SURGERY | Age: 44
End: 2019-02-01

## 2019-02-01 VITALS
BODY MASS INDEX: 27.76 KG/M2 | TEMPERATURE: 97.2 F | SYSTOLIC BLOOD PRESSURE: 111 MMHG | HEIGHT: 64 IN | DIASTOLIC BLOOD PRESSURE: 74 MMHG | HEART RATE: 70 BPM | OXYGEN SATURATION: 100 % | WEIGHT: 162.6 LBS | RESPIRATION RATE: 16 BRPM

## 2019-02-01 DIAGNOSIS — E66.3 OVERWEIGHT: Primary | ICD-10-CM

## 2019-02-01 DIAGNOSIS — Z71.3 WEIGHT LOSS COUNSELING, ENCOUNTER FOR: ICD-10-CM

## 2019-02-01 NOTE — PROGRESS NOTES
Nursing Note for Medical Monitoring in the [facility] Loss Program      Krysten Vaughan is a 37 y.o. female who is enrolled in [facility]    Krysten Vaughan was prescribed the VLCD / LCD. Did you have any problems adhering to the program last week? no  If yes, please explain: but patient wants to be in maintenance and off diet. Since your last visit, have you experienced any complications? Yes, yeast infection    Have you received any other medical care this week? yes  If yes, where and for what? Cardiology last week cleared and pcp last week for yeast infection, no one seen this past week. Have you had any change in your medications since your last visit? no  If yes what? Are you taking an appetite suppressant? no   If yes:  Do you need a refill? BP Readings from Last 3 Encounters:   02/01/19 111/74   01/25/19 112/80   01/18/19 118/82        Eating Habits Over Last Week:  Did you take in 64 oz of non-caloric fluids? yes     Did you consume your prescribed meal replacement regimen each day?  yes       Physical Activity Over the Past Week:    Aerobic exercise: 420 min  Resistance exercise: 0 workouts / week

## 2019-02-01 NOTE — PROGRESS NOTES
[facility] Progress Note:   F/up Provider Visit    CC: Weight Management    Sita Marx is a 37 y.o. female who is here for her  f/up medical provider visit for the VLCD Program. she did attend class last week. Would like to transition to [facility], is happy where she is now. Has been at lower weights but has not felt well there. Recently seen by santana for eval of chest pains and palps; she reports she was found to be clear of any abnormalities. Weight History  Weight Metrics 2/1/2019 1/25/2019 1/25/2019 1/18/2019 1/11/2019 12/21/2018 12/7/2018   Weight 162 lb 9.6 oz - 159 lb 12.8 oz 162 lb 1.6 oz 160 lb 3.2 oz 160 lb 8 oz -   Waist Measure Inches - 30.5 - 30.5 31 30.25 30.5   Exercise Mins/week - - - - - - -   Body Fat % - - - - - - -   BMI 27.91 kg/m2 - 27.43 kg/m2 27.82 kg/m2 27.5 kg/m2 27.55 kg/m2 -       Starting wt: 191  Goal wt: 150 now changed to 160 ; at goal     Ideal body weight: 54.7 kg (120 lb 9.5 oz)  Adjusted ideal body weight: 62.3 kg (137 lb 6.3 oz)  Body mass index is 27.91 kg/m². History    Past Medical History:   Diagnosis Date    Anxiety     Cancer ([facility] [address]     kidney    Chronic kidney disease     Stage II    Depression     pt states anxiety    Eye twitch     Hypertension     Personal history of kidney cancer        Past Surgical History:   Procedure Laterality Date    HX COLONOSCOPY      HX HYSTERECTOMY      HX NEPHRECTOMY Left     HX OOPHORECTOMY Right        Current Outpatient Medications   Medication Sig Dispense Refill    calcium-cholecalciferol, d3, (CALCIUM 600 + D) 600-125 mg-unit tab Take  by mouth.  docusate sodium (STOOL SOFTENER) 100 mg capsule Take 100 mg by mouth two (2) times a day.  cholecalciferol (VITAMIN D3) 1,000 unit cap Take 3,000 Units by mouth daily.  albuterol (PROVENTIL HFA, VENTOLIN HFA, PROAIR HFA) 90 mcg/actuation inhaler 1-2 Puffs.       benzonatate (TESSALON) 100 mg capsule Take 1 capsule 3 times daily as needed for coughing, swallow capsules whole.  cetirizine (ZYRTEC) 10 mg tablet 10 mg.      ubidecarenone/vitamin E mixed (COQ10  PO) Take  by mouth daily.  L gasseri/B bifidum/B longum (Snaptalent PO) Take  by mouth daily.  ascorbic acid, vitamin C, (VITAMIN C) 500 mg tablet Take 500 mg by mouth daily.  multivitamin (ONE A DAY) tablet Take 1 Tab by mouth daily.  pyridoxine HCl, vitamin B6, (VITAMIN B-6 PO) Take  by mouth daily.  fish oil-omega-3 fatty acids 340-1,000 mg capsule Take 1 Cap by mouth daily.  FIBER, PSYLLIUM HUSK, PO Take  by mouth daily.  Ascorbic Acid-Multivits-Min (EMERGEN-C) 1,000 mg pwep Take  by mouth daily.  magnesium 250 mg tab Take  by mouth daily.  zinc 50 mg tab tablet Take 50 mg by mouth daily.  lactobacillus rhamnosus gg 15 billion cell (CULTURELLE) 15 billion cell capsule Take 1 Cap by mouth daily.  potassium 99 mg tablet Take 99 mg by mouth two (2) times a day.  butalbital-acetaminophen-caff (FIORICET) -40 mg per capsule Take 1-2 Caps by mouth every six (6) hours as needed for Pain. Max Daily Amount: 8 Caps. 20 Cap 0    simvastatin (ZOCOR) 10 mg tablet Take 20 mg by mouth nightly.  losartan (COZAAR) 100 mg tablet Take 100 mg by mouth daily.  hydrochlorothiazide (HYDRODIURIL) 25 mg tablet Take 25 mg by mouth daily. Allergies   Allergen Reactions    Seafood Hives and Swelling    Iodine Other (comments)    Nsaids (Non-Steroidal Anti-Inflammatory Drug) Other (comments)    Shellfish Derived Angioedema       Social History     Tobacco Use    Smoking status: Former Smoker     Last attempt to quit: 2011     Years since quittin.1    Smokeless tobacco: Never Used   Substance Use Topics    Alcohol use: Yes     Frequency: Monthly or less    Drug use: No       History reviewed. No pertinent family history. No family status information on file. Medications:  Outpatient Medications Marked as Taking for the 2/1/19 encounter (Office Visit) with Shaye Joshua NP   Medication Sig Dispense Refill    calcium-cholecalciferol, d3, (CALCIUM 600 + D) 600-125 mg-unit tab Take  by mouth.  docusate sodium (STOOL SOFTENER) 100 mg capsule Take 100 mg by mouth two (2) times a day.  cholecalciferol (VITAMIN D3) 1,000 unit cap Take 3,000 Units by mouth daily.  albuterol (PROVENTIL HFA, VENTOLIN HFA, PROAIR HFA) 90 mcg/actuation inhaler 1-2 Puffs.  benzonatate (TESSALON) 100 mg capsule Take 1 capsule 3 times daily as needed for coughing, swallow capsules whole.  cetirizine (ZYRTEC) 10 mg tablet 10 mg.      ubidecarenone/vitamin E mixed (COQ10  PO) Take  by mouth daily.  L gasseri/B bifidum/B longum (VIOlife PO) Take  by mouth daily.  ascorbic acid, vitamin C, (VITAMIN C) 500 mg tablet Take 500 mg by mouth daily.  multivitamin (ONE A DAY) tablet Take 1 Tab by mouth daily.  pyridoxine HCl, vitamin B6, (VITAMIN B-6 PO) Take  by mouth daily.  fish oil-omega-3 fatty acids 340-1,000 mg capsule Take 1 Cap by mouth daily.  FIBER, PSYLLIUM HUSK, PO Take  by mouth daily.  Ascorbic Acid-Multivits-Min (EMERGEN-C) 1,000 mg pwep Take  by mouth daily.  magnesium 250 mg tab Take  by mouth daily.  zinc 50 mg tab tablet Take 50 mg by mouth daily.  lactobacillus rhamnosus gg 15 billion cell (CULTURELLE) 15 billion cell capsule Take 1 Cap by mouth daily.  potassium 99 mg tablet Take 99 mg by mouth two (2) times a day.  butalbital-acetaminophen-caff (FIORICET) -40 mg per capsule Take 1-2 Caps by mouth every six (6) hours as needed for Pain. Max Daily Amount: 8 Caps. 20 Cap 0    simvastatin (ZOCOR) 10 mg tablet Take 20 mg by mouth nightly.  losartan (COZAAR) 100 mg tablet Take 100 mg by mouth daily.       hydrochlorothiazide (HYDRODIURIL) 25 mg tablet Take 25 mg by mouth daily. Review of Systems  Review of Systems   All other systems reviewed and are negative. Objective  Visit Vitals  /74   Pulse 70   Temp 97.2 °F (36.2 °C) (Oral)   Resp 16   Ht 5' 4\" (1.626 m)   Wt 73.8 kg (162 lb 9.6 oz)   SpO2 100%   BMI 27.91 kg/m²     No LMP recorded. Patient has had a hysterectomy. Physical Exam  Physical Exam   Constitutional: She is oriented to person, place, and time. She appears well-developed and well-nourished. HENT:   Head: Normocephalic. Cardiovascular: Normal rate. Pulmonary/Chest: Effort normal.   Musculoskeletal: Normal range of motion. Neurological: She is alert and oriented to person, place, and time. Skin: Skin is warm and dry. Psychiatric: She has a normal mood and affect. Nursing note and vitals reviewed. Assessment / Plan      1. Weight management    Move to maint    Progress was reviewed with patient. Goal(s) for next appointment:   Follow up 3 mo; maintain weight       2.   Labs    Latest results reviewed with patient        >50% of 30 min visit spent counseling     SEAN Gleason

## 2019-03-01 ENCOUNTER — CLINICAL SUPPORT (OUTPATIENT)
Dept: FAMILY MEDICINE CLINIC | Age: 44
End: 2019-03-01

## 2019-03-01 VITALS
BODY MASS INDEX: 28.13 KG/M2 | DIASTOLIC BLOOD PRESSURE: 74 MMHG | HEIGHT: 64 IN | WEIGHT: 164.8 LBS | SYSTOLIC BLOOD PRESSURE: 106 MMHG | HEART RATE: 92 BPM

## 2019-03-01 DIAGNOSIS — E66.9 OBESITY, UNSPECIFIED CLASSIFICATION, UNSPECIFIED OBESITY TYPE, UNSPECIFIED WHETHER SERIOUS COMORBIDITY PRESENT: Primary | ICD-10-CM

## 2019-03-01 DIAGNOSIS — Z76.89 ENCOUNTER FOR WEIGHT MANAGEMENT: Primary | ICD-10-CM

## 2019-03-01 NOTE — PROGRESS NOTES
Progress Note: Weekly Medical Monitoring in the [facility] Weight Loss Program    Is there anything that you or the patient needs to let the supervising provider know about? no    Over the past week, have you experienced any side-effects? no    Micki Ag is a 53 y.o. female who is enrolled in [facility] Program    Micki Ag was prescribed the VLCD / LCD. Visit Vitals  /74   Pulse 92   Ht 5' 4\" (1.626 m)   Wt 74.8 kg (164 lb 12.8 oz)   BMI 28.29 kg/m²     Weight Metrics 3/1/2019 3/1/2019 2/1/2019 1/25/2019 1/25/2019 1/18/2019 1/11/2019   Weight - 164 lb 12.8 oz 162 lb 9.6 oz - 159 lb 12.8 oz 162 lb 1.6 oz 160 lb 3.2 oz   Waist Measure Inches 30.25 - - 30.5 - 30.5 31   Exercise Mins/week - - - - - - -   Body Fat % - - - - - - -   BMI - 28.29 kg/m2 27.91 kg/m2 - 27.43 kg/m2 27.82 kg/m2 27.5 kg/m2         Have you received any other medical care this week? no  If yes, where and for what? Have you had any change in your medications since your last visit? no  If yes what? Did you have any problems adhering to the program last week? no  If yes, please explain:       Eating Habits Over Last Week:  Did you take in 64 oz of non-caloric fluids?  yes     Did you consume your prescribed meal replacement regimen each day? no       Physical Activity Over the Past Week:    Aerobic exercise: 210 min  Resistance exercise: 0 workouts / week

## 2019-03-22 ENCOUNTER — CLINICAL SUPPORT (OUTPATIENT)
Dept: FAMILY MEDICINE CLINIC | Age: 44
End: 2019-03-22

## 2019-03-22 ENCOUNTER — HOSPITAL ENCOUNTER (OUTPATIENT)
Dept: BARIATRICS/WEIGHT MGMT | Age: 44
Discharge: HOME | End: 2019-03-22

## 2019-03-22 ENCOUNTER — DOCUMENTATION ONLY (OUTPATIENT)
Dept: BARIATRICS/WEIGHT MGMT | Age: 44
End: 2019-03-22

## 2019-03-22 VITALS — WEIGHT: 161.9 LBS | BODY MASS INDEX: 27.79 KG/M2

## 2019-03-22 DIAGNOSIS — E66.9 OBESITY, UNSPECIFIED CLASSIFICATION, UNSPECIFIED OBESITY TYPE, UNSPECIFIED WHETHER SERIOUS COMORBIDITY PRESENT: Primary | ICD-10-CM

## 2019-03-22 NOTE — PATIENT INSTRUCTIONS
Medically Supervised Weight Loss Program        Patient's Name: Alonso Kingston   Age: 53 y.o. YOB: 1975   Sex: female      Current Weight:  161.9    Patient is in the [facility] Maintenance Class. Patient attended the weekly class facilitated by Registered Dietitian.   Topic discussed was: Open Forum    Eating Habits Since Last Visit:  0    Average Weekly Physical Activity:  Aerobic Exercise: 30 minutes  Resistance Exercise: 10 minutes    Lanney Hammans, MS RD  3/22/19

## 2019-03-22 NOTE — PROGRESS NOTES
Medically Supervised Weight Loss Program   Transition to Maintenance      Patients Name: Callie Talavera      :  1975          Patient has been in the medically supervised program for 41  weeks. Height: 5 f       205 leading up to program  Starting weight: 191 Current Weight: 161   Goal Weight:            Patient has lost  30  pounds    Current BMI:  31.5    Starting BMI:     Reason for Visit: Transition to adapting    Summary:   Patient is currently on 2 [facility] products per day. Food outside of meal replacement includes:  Patient states she recently got braces and is eating \"slider\" food, such as mashed potatoes (Jayla's baked potato), chili. She states she will have spacers for 8 weeks she has to stick to liquidy food. She is drinking a lot of milk and unsweetened applesauce. Patient's current activity level is:  Doing a lot of walking at work. 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 She states she does try to do some jogging. Patient is encouraged to keep their daily protein intake around 80 grams per day and keep their daily carbohydrate intake under 100 grams per day. Patient had been educated on the exchange list at least one time during the reducing phase. I spent some time reviewing the exchange list during today's visit. Patient has been educated on the adapting phase and understands the 5 week transition back to food. Patient was enrolled in the S.T.A.R. Maintenance Program.  Patient understands they are required to attend at least one class per month in order to be an active member in the maintenance program and are required to see the physician 1 x every 3 months. Patient understands they are allowed to purchase 8 boxes of food per month    Patient was given a list of protein shakes and bars that I feel are close in comparison, in terms of protein, calories, and carbohydrates to what her [facility] supplements provide.     Safety Weight Zones    Patient's S.T. A. Rting Weight is 161 pounds (goal weight/starting maintenance weight). Green Safety Zone (No more than 2.5% over your S.T. A. Rting weight). - Your GREEN Safety Zone weight is: 165    Yellow Caution Zone (2.6%-5% over S.T. A. R ting weight)  - Your YELLOW Caution Zone Weight is: 165.2-169    Red Correction Zone (Weekly average weight is greater than 10% over the S.T. A. R ting weight)  Your RED Correction Zone weight is: 177    Questions that patient has includes:     WOW Movement or Improvement in overall health:     Patient recently got braces and has spacers in for 8 weeks. She has been eating mashed potatoes and other slider food, which we talked about protein based foods that would be soft and that she could use during this time. Patient understood the maintenance diet and is going to aim to stay under 80 grams of carbohydrates per day. Patient was provided my E-mail address and phone number and may contact me with any nutrition questions.     Lanney Hammans, [address]  3/22/19

## 2019-05-03 ENCOUNTER — OFFICE VISIT (OUTPATIENT)
Dept: SURGERY | Age: 44
End: 2019-05-03

## 2019-05-03 VITALS
RESPIRATION RATE: 17 BRPM | HEIGHT: 64 IN | OXYGEN SATURATION: 100 % | DIASTOLIC BLOOD PRESSURE: 78 MMHG | TEMPERATURE: 98.4 F | SYSTOLIC BLOOD PRESSURE: 109 MMHG | HEART RATE: 73 BPM | BODY MASS INDEX: 27.83 KG/M2 | WEIGHT: 163 LBS

## 2019-05-03 DIAGNOSIS — R00.2 HEART PALPITATIONS: ICD-10-CM

## 2019-05-03 DIAGNOSIS — Z71.3 WEIGHT LOSS COUNSELING, ENCOUNTER FOR: ICD-10-CM

## 2019-05-03 DIAGNOSIS — E66.3 OVERWEIGHT: Primary | ICD-10-CM

## 2019-05-03 DIAGNOSIS — E78.5 HYPERLIPIDEMIA, UNSPECIFIED HYPERLIPIDEMIA TYPE: ICD-10-CM

## 2019-05-03 NOTE — PATIENT INSTRUCTIONS
Recent Results (from the past 2688 hour(s))   METABOLIC PANEL, COMPREHENSIVE    Collection Time: 01/25/19  7:17 AM   Result Value Ref Range    Glucose 91 70 - 99 mg/dL    BUN 15 6 - 22 mg/dL    Creatinine 0.9 0.5 - 1.2 mg/dL    Sodium 139 133 - 145 mmol/L    Potassium 3.8 3.5 - 5.5 mmol/L    Chloride 98 98 - 110 mmol/L    CO2 30 20 - 32 mmol/L    AST (SGOT) 24 10 - 37 U/L    ALT (SGPT) 21 5 - 40 U/L    Alk.  phosphatase 41 25 - 115 U/L    Bilirubin, total 0.5 0.2 - 1.2 mg/dL    Calcium 9.1 8.4 - 10.5 mg/dL    Protein, total 6.6 6.4 - 8.3 g/dL    Albumin 4.2 3.5 - 5.0 g/dL    A-G Ratio 1.8 1.1 - 2.6 ratio    Globulin 2.4 2.0 - 4.0 g/dL    Anion gap 11.0 mmol/L    GFRAA >60.0 >60.0    GFRNA >60.0 >60.0   URIC ACID    Collection Time: 01/25/19  7:17 AM   Result Value Ref Range    Uric acid 3.4 2.2 - 7.7 mg/dL

## 2019-05-03 NOTE — PROGRESS NOTES
Nursing Note for Medical Monitoring in the [facility] Weight Loss Program      Rudi Rhoades is a 53 y.o. female who is enrolled in [facility] Weight Loss Program    Rudi Rhoades was prescribed the VLCD / LCD. Did you have any problems adhering to the program last week? yes  If yes, please explain:     Since your last visit, have you experienced any complications? no    Have you received any other medical care this week? no  If yes, where and for what? Have you had any change in your medications since your last visit? no  If yes what? Are you taking an appetite suppressant? no   If yes:  Do you need a refill? BP Readings from Last 3 Encounters:   05/03/19 109/78   03/01/19 106/74   02/01/19 111/74        Eating Habits Over Last Week:  Did you take in 64 oz of non-caloric fluids? yes     Did you consume your prescribed meal replacement regimen each day?  yes       Physical Activity Over the Past Week:    Aerobic exercise: 210 min  Resistance exercise: 0 workouts / week

## 2019-05-06 NOTE — PROGRESS NOTES
F/up Provider Visit    CC: Weight Management    Quintin Reyes is a 53 y.o. female who is here for her  f/up medical provider visit for the Star maint. Program. She reports attending class. Would like to approach weight loss again in Aug /Sept and has a goal of -10lbs. She would like to achieve this in a slower manner as she had s/e with very rapid weight loss in the past. Reports doing overall well and feels very stable at this time. Weight History  Weight Metrics 5/3/2019 5/3/2019 3/22/2019 3/1/2019 3/1/2019 2/1/2019 1/25/2019   Weight - 163 lb 161 lb 14.4 oz - 164 lb 12.8 oz 162 lb 9.6 oz -   Waist Measure Inches 30 - - 30.25 - - 30.5   Exercise Mins/week - - - - - - -   Body Fat % - - - - - - -   BMI - 27.98 kg/m2 27.79 kg/m2 - 28.29 kg/m2 27.91 kg/m2 -       Starting wt: 191  Goal wt: 150     Ideal body weight: 54.7 kg (120 lb 9.5 oz)  Adjusted ideal body weight: 62.4 kg (137 lb 8.9 oz)  Body mass index is 27.98 kg/m². History    Past Medical History:   Diagnosis Date    Anxiety     Cancer ([address]     kidney    Chronic kidney disease     Stage II    Depression     pt states anxiety    Eye twitch     Hypertension     Personal history of kidney cancer        Past Surgical History:   Procedure Laterality Date    HX COLONOSCOPY      HX HYSTERECTOMY      HX NEPHRECTOMY Left     HX OOPHORECTOMY Right        Current Outpatient Medications   Medication Sig Dispense Refill    calcium-cholecalciferol, d3, (CALCIUM 600 + D) 600-125 mg-unit tab Take  by mouth.  docusate sodium (STOOL SOFTENER) 100 mg capsule Take 100 mg by mouth two (2) times a day.  cholecalciferol (VITAMIN D3) 1,000 unit cap Take 3,000 Units by mouth daily.  albuterol (PROVENTIL HFA, VENTOLIN HFA, PROAIR HFA) 90 mcg/actuation inhaler 1-2 Puffs.  benzonatate (TESSALON) 100 mg capsule Take 1 capsule 3 times daily as needed for coughing, swallow capsules whole.       cetirizine (ZYRTEC) 10 mg tablet 10 mg.     [facility] ubidecarenone/vitamin E mixed (COQ10  PO) Take  by mouth daily.  L gasseri/B bifidum/B longum ([employer] PO) Take  by mouth daily.  ascorbic acid, vitamin C, (VITAMIN C) 500 mg tablet Take 500 mg by mouth daily.  multivitamin (ONE A DAY) tablet Take 1 Tab by mouth daily.  pyridoxine HCl, vitamin B6, (VITAMIN B-6 PO) Take  by mouth daily.  fish oil-omega-3 fatty acids 340-1,000 mg capsule Take 1 Cap by mouth daily.  FIBER, PSYLLIUM HUSK, PO Take  by mouth daily.  Ascorbic Acid-Multivits-Min (EMERGEN-C) 1,000 mg pwep Take  by mouth daily.  magnesium 250 mg tab Take  by mouth daily.  zinc 50 mg tab tablet Take 50 mg by mouth daily.  lactobacillus rhamnosus gg 15 billion cell (CULTURELLE) 15 billion cell capsule Take 1 Cap by mouth daily.  potassium 99 mg tablet Take 99 mg by mouth two (2) times a day.  butalbital-acetaminophen-caff (FIORICET) -40 mg per capsule Take 1-2 Caps by mouth every six (6) hours as needed for Pain. Max Daily Amount: 8 Caps. 20 Cap 0    simvastatin (ZOCOR) 10 mg tablet Take 20 mg by mouth nightly.  losartan (COZAAR) 100 mg tablet Take 100 mg by mouth daily.  hydrochlorothiazide (HYDRODIURIL) 25 mg tablet Take 25 mg by mouth daily. Allergies   Allergen Reactions    Seafood Hives and Swelling    Iodine Other (comments)    Nsaids (Non-Steroidal Anti-Inflammatory Drug) Other (comments)    Shellfish Derived Angioedema       Social History     Tobacco Use    Smoking status: Former Smoker     Last attempt to quit: 2011     Years since quittin.4    Smokeless tobacco: Never Used   Substance Use Topics    Alcohol use: Yes     Frequency: Monthly or less    Drug use: No       History reviewed. No pertinent family history. No family status information on file.          Medications:  Outpatient Medications Marked as Taking for the 5/3/19 encounter (Office Visit) with Aylin Brian M, NP   Medication Sig Dispense Refill    calcium-cholecalciferol, d3, (CALCIUM 600 + D) 600-125 mg-unit tab Take  by mouth.  docusate sodium (STOOL SOFTENER) 100 mg capsule Take 100 mg by mouth two (2) times a day.  cholecalciferol (VITAMIN D3) 1,000 unit cap Take 3,000 Units by mouth daily.  albuterol (PROVENTIL HFA, VENTOLIN HFA, PROAIR HFA) 90 mcg/actuation inhaler 1-2 Puffs.  benzonatate (TESSALON) 100 mg capsule Take 1 capsule 3 times daily as needed for coughing, swallow capsules whole.  cetirizine (ZYRTEC) 10 mg tablet 10 mg.      ubidecarenone/vitamin E mixed (COQ10  PO) Take  by mouth daily.  L gasseri/B bifidum/B longum (VTEX PO) Take  by mouth daily.  ascorbic acid, vitamin C, (VITAMIN C) 500 mg tablet Take 500 mg by mouth daily.  multivitamin (ONE A DAY) tablet Take 1 Tab by mouth daily.  pyridoxine HCl, vitamin B6, (VITAMIN B-6 PO) Take  by mouth daily.  fish oil-omega-3 fatty acids 340-1,000 mg capsule Take 1 Cap by mouth daily.  FIBER, PSYLLIUM HUSK, PO Take  by mouth daily.  Ascorbic Acid-Multivits-Min (EMERGEN-C) 1,000 mg pwep Take  by mouth daily.  magnesium 250 mg tab Take  by mouth daily.  zinc 50 mg tab tablet Take 50 mg by mouth daily.  lactobacillus rhamnosus gg 15 billion cell (CULTURELLE) 15 billion cell capsule Take 1 Cap by mouth daily.  potassium 99 mg tablet Take 99 mg by mouth two (2) times a day.  butalbital-acetaminophen-caff (FIORICET) -40 mg per capsule Take 1-2 Caps by mouth every six (6) hours as needed for Pain. Max Daily Amount: 8 Caps. 20 Cap 0    simvastatin (ZOCOR) 10 mg tablet Take 20 mg by mouth nightly.  losartan (COZAAR) 100 mg tablet Take 100 mg by mouth daily.  hydrochlorothiazide (HYDRODIURIL) 25 mg tablet Take 25 mg by mouth daily. Review of Systems  Review of Systems   Cardiovascular: Positive for palpitations.    All other systems reviewed and are negative. Objective  Visit Vitals  /78   Pulse 73   Temp 98.4 °F (36.9 °C) (Oral)   Resp 17   Ht 5' 4\" (1.626 m)   Wt 73.9 kg (163 lb)   SpO2 100%   BMI 27.98 kg/m²     No LMP recorded. Patient has had a hysterectomy. Physical Exam  Physical Exam   Constitutional: She is oriented to person, place, and time. She appears well-developed and well-nourished. HENT:   Head: Normocephalic. Cardiovascular: Normal rate. Pulmonary/Chest: Effort normal.   Musculoskeletal: Normal range of motion. Neurological: She is alert and oriented to person, place, and time. Skin: Skin is warm and dry. Psychiatric: She has a normal mood and affect. Nursing note and vitals reviewed. Assessment / Plan      1. Overweight  2. BMI 27.0-27.9,adult  3. Weight loss counseling, encounter for  Weight management    Con't maint    Progress was reviewed with patient. Goal(s) for next appointment:   Follow up 3 mo; maintain weight   - METABOLIC PANEL, COMPREHENSIVE; Future  - LIPID PANEL; Future  - CBC WITH AUTOMATED DIFF; Future  - MAGNESIUM; Future      4. Heart palpitations - cleared by CV; occasional   - METABOLIC PANEL, COMPREHENSIVE; Future  - MAGNESIUM; Future    5. Hyperlipidemia, unspecified hyperlipidemia type  - LIPID PANEL; Future  - CBC WITH AUTOMATED DIFF; Future  - MAGNESIUM;  Future       Latest results reviewed with patient, printed with AVS  Printed order sheet mailed to pt        >50% of 20 min visit spent counseling     JHONY Long-BC

## 2019-06-13 LAB
A-G RATIO,AGRAT: 1.5 RATIO (ref 1.1–2.6)
ABSOLUTE LYMPHOCYTE COUNT, 10803: 1.2 K/UL (ref 1–4.8)
ALBUMIN SERPL-MCNC: 4.1 G/DL (ref 3.5–5)
ALP SERPL-CCNC: 46 U/L (ref 25–115)
ALT SERPL-CCNC: 24 U/L (ref 5–40)
ANION GAP SERPL CALC-SCNC: 13 MMOL/L
AST SERPL W P-5'-P-CCNC: 23 U/L (ref 10–37)
BASOPHILS # BLD: 0 K/UL (ref 0–0.2)
BASOPHILS NFR BLD: 1 % (ref 0–2)
BILIRUB SERPL-MCNC: 0.7 MG/DL (ref 0.2–1.2)
BUN SERPL-MCNC: 14 MG/DL (ref 6–22)
CALCIUM SERPL-MCNC: 9.1 MG/DL (ref 8.4–10.5)
CHLORIDE SERPL-SCNC: 98 MMOL/L (ref 98–110)
CHOLEST SERPL-MCNC: 171 MG/DL (ref 110–200)
CO2 SERPL-SCNC: 28 MMOL/L (ref 20–32)
CREAT SERPL-MCNC: 1 MG/DL (ref 0.5–1.2)
EOSINOPHIL # BLD: 0.1 K/UL (ref 0–0.5)
EOSINOPHIL NFR BLD: 3 % (ref 0–6)
ERYTHROCYTE [DISTWIDTH] IN BLOOD BY AUTOMATED COUNT: 13.9 % (ref 10–15.5)
GFRAA, 66117: >60
GFRNA, 66118: 57
GLOBULIN,GLOB: 2.8 G/DL (ref 2–4)
GLUCOSE SERPL-MCNC: 95 MG/DL (ref 70–99)
GRANULOCYTES,GRANS: 52 % (ref 40–75)
HCT VFR BLD AUTO: 35.5 % (ref 35.1–48)
HDLC SERPL-MCNC: 2.5 MG/DL (ref 0–5)
HDLC SERPL-MCNC: 68 MG/DL (ref 40–59)
HGB BLD-MCNC: 11.9 G/DL (ref 11.7–16)
LDLC SERPL CALC-MCNC: 93 MG/DL (ref 50–99)
LYMPHOCYTES, LYMLT: 36 % (ref 20–45)
MAGNESIUM SERPL-MCNC: 1.9 MG/DL (ref 1.6–2.5)
MCH RBC QN AUTO: 29 PG (ref 26–34)
MCHC RBC AUTO-ENTMCNC: 34 G/DL (ref 31–36)
MCV RBC AUTO: 85 FL (ref 80–95)
MONOCYTES # BLD: 0.3 K/UL (ref 0.1–1)
MONOCYTES NFR BLD: 8 % (ref 3–12)
NEUTROPHILS # BLD AUTO: 1.7 K/UL (ref 1.8–7.7)
PLATELET # BLD AUTO: 202 K/UL (ref 140–440)
PMV BLD AUTO: 11.6 FL (ref 9–13)
POTASSIUM SERPL-SCNC: 3.5 MMOL/L (ref 3.5–5.5)
PROT SERPL-MCNC: 6.9 G/DL (ref 6.4–8.3)
RBC # BLD AUTO: 4.17 M/UL (ref 3.8–5.2)
SODIUM SERPL-SCNC: 139 MMOL/L (ref 133–145)
TRIGL SERPL-MCNC: 52 MG/DL (ref 40–149)
VLDLC SERPL CALC-MCNC: 10 MG/DL (ref 8–30)
WBC # BLD AUTO: 3.2 K/UL (ref 4–11)

## 2019-06-14 ENCOUNTER — OFFICE VISIT (OUTPATIENT)
Dept: SURGERY | Age: 44
End: 2019-06-14

## 2019-06-14 VITALS
BODY MASS INDEX: 32.08 KG/M2 | DIASTOLIC BLOOD PRESSURE: 82 MMHG | HEIGHT: 60 IN | WEIGHT: 163.4 LBS | RESPIRATION RATE: 18 BRPM | TEMPERATURE: 98.3 F | HEART RATE: 76 BPM | SYSTOLIC BLOOD PRESSURE: 126 MMHG

## 2019-06-14 DIAGNOSIS — Z71.3 WEIGHT LOSS COUNSELING, ENCOUNTER FOR: ICD-10-CM

## 2019-06-14 DIAGNOSIS — E66.9 OBESITY (BMI 30-39.9): Primary | ICD-10-CM

## 2019-06-14 DIAGNOSIS — E78.5 HYPERLIPIDEMIA, UNSPECIFIED HYPERLIPIDEMIA TYPE: ICD-10-CM

## 2019-06-14 RX ORDER — MAGNESIUM HYDROXIDE 2400 MG/10ML
15 SUSPENSION ORAL
COMMUNITY
End: 2022-01-28

## 2019-06-14 RX ORDER — SERTRALINE HYDROCHLORIDE 25 MG/1
TABLET, FILM COATED ORAL DAILY
COMMUNITY
End: 2021-01-18 | Stop reason: ALTCHOICE

## 2019-06-14 NOTE — LETTER
6/17/19 Patient: Amye Severance YOB: 1975 Date of Visit: 6/14/2019 Jr. Juaquin Baumgarten, MD 
[address] [place] [address] VIA Facsimile: [phone] Dear Belkis Kirk. Juaquin Baumgarten, MD, Thank you for [facility] [place] [address] [facility]. My notes for this consultation are attached. If you have questions, please do not hesitate to call me. I look forward to following your patient along with you.  
 
 
Sincerely, 
 
Etienne Morgan NP

## 2019-06-14 NOTE — PATIENT INSTRUCTIONS
Recent Results (from the past 672 hour(s))   CBC WITH AUTOMATED DIFF    Collection Time: 06/13/19  9:28 AM   Result Value Ref Range    WBC 3.2 (L) 4.0 - 11.0 K/uL    RBC 4.17 3.80 - 5.20 M/uL    HGB 11.9 11.7 - 16.0 g/dL    HCT 35.5 35.1 - 48.0 %    MCV 85 80 - 95 fL    MCH 29 26 - 34 pg    MCHC 34 31 - 36 g/dL    RDW 13.9 10.0 - 15.5 %    PLATELET 500 055 - 725 K/uL    MPV 11.6 9.0 - 13.0 fL    NEUTROPHILS 52 40 - 75 %    Lymphocytes 36 20 - 45 %    MONOCYTES 8 3 - 12 %    EOSINOPHILS 3 0 - 6 %    BASOPHILS 1 0 - 2 %    ABS. NEUTROPHILS 1.7 (L) 1.8 - 7.7 K/uL    ABSOLUTE LYMPHOCYTE COUNT 1.2 1.0 - 4.8 K/uL    ABS. MONOCYTES 0.3 0.1 - 1.0 K/uL    ABS. EOSINOPHILS 0.1 0.0 - 0.5 K/uL    ABS. BASOPHILS 0.0 0.0 - 0.2 K/uL   LIPID PANEL    Collection Time: 06/13/19  9:28 AM   Result Value Ref Range    Triglyceride 52 40 - 149 mg/dL    HDL Cholesterol 68 (H) 40 - 59 mg/dL    Cholesterol, total 171 110 - 200 mg/dL    CHOLESTEROL/HDL 2.5 0.0 - 5.0    LDL, calculated 93 50 - 99 mg/dL    VLDL, calculated 10 8 - 30 mg/dL   METABOLIC PANEL, COMPREHENSIVE    Collection Time: 06/13/19  9:28 AM   Result Value Ref Range    Glucose 95 70 - 99 mg/dL    BUN 14 6 - 22 mg/dL    Creatinine 1.0 0.5 - 1.2 mg/dL    Sodium 139 133 - 145 mmol/L    Potassium 3.5 3.5 - 5.5 mmol/L    Chloride 98 98 - 110 mmol/L    CO2 28 20 - 32 mmol/L    AST (SGOT) 23 10 - 37 U/L    ALT (SGPT) 24 5 - 40 U/L    Alk.  phosphatase 46 25 - 115 U/L    Bilirubin, total 0.7 0.2 - 1.2 mg/dL    Calcium 9.1 8.4 - 10.5 mg/dL    Protein, total 6.9 6.4 - 8.3 g/dL    Albumin 4.1 3.5 - 5.0 g/dL    A-G Ratio 1.5 1.1 - 2.6 ratio    Globulin 2.8 2.0 - 4.0 g/dL    Anion gap 13.0 mmol/L    GFRAA >60.0 >60.0    GFRNA 57.0 (L) >60.0   MAGNESIUM    Collection Time: 06/13/19  9:28 AM   Result Value Ref Range    Magnesium 1.9 1.6 - 2.5 mg/dL

## 2019-06-14 NOTE — PROGRESS NOTES
Chief Complaint   Patient presents with    Weight Management   1. Have you been to the ER, urgent care clinic since your last visit? Hospitalized since your last visit? no    2. Have you seen or consulted any other health care providers outside of the [address] [place] [address] since your last visit? Include any pap smears or colon screening. no  Nursing Note for Medical Monitoring in the [facility]      Sterling Borrego is a 53 y.o. female who is enrolled in [facility]    Sterling Borrego was prescribed the VLCD / LCD. Did you have any problems adhering to the program last week? A little   If yes, please explain: due to braces ate more carbs  Since your last visit, have you experienced any complications? no    Have you received any other medical care this week? no  If yes, where and for what? Have you had any change in your medications since your last visit? no  If yes what? Are you taking an appetite suppressant? no   If yes:  Do you need a refill? BP Readings from Last 3 Encounters:   05/03/19 109/78   03/01/19 106/74   02/01/19 111/74        Eating Habits Over Last Week:  Did you take in 64 oz of non-caloric fluids? yes     Did you consume your prescribed meal replacement regimen each day?  Had a little more carbs than usual      Physical Activity Over the Past Week:    Aerobic exercise: 420 min  Resistance exercise: 0 workouts / week
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
## 2019-10-16 ENCOUNTER — TELEPHONE (OUTPATIENT)
Dept: SURGERY | Age: 44
End: 2019-10-16

## 2019-10-16 NOTE — TELEPHONE ENCOUNTER
Called patient today in attempt to schedule provider visit fro [facility] program. VM not setup. Unable to leave message. Patient graduated from the Medical Weight Loss program and has been in the [facility] Maintenance phase. Patient is outside of attendance policy for education, medical monitoring, and/or provider followups. Patient is automatically disenrolled from the Medical Weight Loss [facility] Maintenance Program based on the required attendance policy and signed agreement to the policies upon enrollment. Maintenance Program Policies:    1. I agree to meet with the provider every 3 months while enrolled. 2. I agree to attend at least one weight loss class AND weigh-in a month for the next 6 months. The webinar alone does not meet requirement. You must attend a weigh-in. *This does not apply the month that you see provider. 3. I understand that my maintenance membership will automatically  6 months from the date it was issued. 4. I understand that in order to renew maintenance membership when it expires, I must have complied with policies 1 and 2.     5. I agree that I will adhere to the nutritional guidelines set by the physician and RD for maintenance and will NOT put myself on a VLCD. *The VLCD is for patients who are being medically monitored weekly. Maintenance patients are limited to 8 boxes/month while in maintenance. No future appointments.

## 2019-12-17 ENCOUNTER — HOSPITAL ENCOUNTER (EMERGENCY)
Age: 44
Discharge: HOME | End: 2019-12-18
Attending: EMERGENCY MEDICINE
Payer: COMMERCIAL

## 2019-12-17 VITALS
WEIGHT: 175 LBS | TEMPERATURE: 99 F | OXYGEN SATURATION: 100 % | DIASTOLIC BLOOD PRESSURE: 90 MMHG | RESPIRATION RATE: 14 BRPM | HEIGHT: 64 IN | SYSTOLIC BLOOD PRESSURE: 133 MMHG | HEART RATE: 74 BPM | BODY MASS INDEX: 29.88 KG/M2

## 2019-12-17 DIAGNOSIS — R45.851 SUICIDAL IDEATION: Primary | ICD-10-CM

## 2019-12-17 LAB
ALBUMIN SERPL-MCNC: 4.2 G/DL (ref 3.4–5)
ALBUMIN/GLOB SERPL: 1.3 {RATIO} (ref 0.8–1.7)
ALP SERPL-CCNC: 63 U/L (ref 45–117)
ALT SERPL-CCNC: 34 U/L (ref 13–56)
AMPHET UR QL SCN: NEGATIVE
ANION GAP SERPL CALC-SCNC: 5 MMOL/L (ref 3–18)
AST SERPL-CCNC: 18 U/L (ref 10–38)
BARBITURATES UR QL SCN: NEGATIVE
BASOPHILS # BLD: 0 K/UL (ref 0–0.1)
BASOPHILS NFR BLD: 0 % (ref 0–2)
BENZODIAZ UR QL: NEGATIVE
BILIRUB SERPL-MCNC: 1.3 MG/DL (ref 0.2–1)
BUN SERPL-MCNC: 11 MG/DL (ref 7–18)
BUN/CREAT SERPL: 9 (ref 12–20)
CALCIUM SERPL-MCNC: 8.7 MG/DL (ref 8.5–10.1)
CANNABINOIDS UR QL SCN: NEGATIVE
CHLORIDE SERPL-SCNC: 102 MMOL/L (ref 100–111)
CO2 SERPL-SCNC: 30 MMOL/L (ref 21–32)
COCAINE UR QL SCN: NEGATIVE
CREAT SERPL-MCNC: 1.17 MG/DL (ref 0.6–1.3)
DIFFERENTIAL METHOD BLD: NORMAL
EOSINOPHIL # BLD: 0.1 K/UL (ref 0–0.4)
EOSINOPHIL NFR BLD: 3 % (ref 0–5)
ERYTHROCYTE [DISTWIDTH] IN BLOOD BY AUTOMATED COUNT: 13.6 % (ref 11.6–14.5)
ETHANOL SERPL-MCNC: <3 MG/DL (ref 0–3)
GLOBULIN SER CALC-MCNC: 3.2 G/DL (ref 2–4)
GLUCOSE SERPL-MCNC: 92 MG/DL (ref 74–99)
HCG UR QL: NEGATIVE
HCT VFR BLD AUTO: 36.5 % (ref 35–45)
HDSCOM,HDSCOM: NORMAL
HGB BLD-MCNC: 12.4 G/DL (ref 12–16)
LYMPHOCYTES # BLD: 1.2 K/UL (ref 0.9–3.6)
LYMPHOCYTES NFR BLD: 26 % (ref 21–52)
MCH RBC QN AUTO: 28.8 PG (ref 24–34)
MCHC RBC AUTO-ENTMCNC: 34 G/DL (ref 31–37)
MCV RBC AUTO: 84.9 FL (ref 74–97)
METHADONE UR QL: NEGATIVE
MONOCYTES # BLD: 0.4 K/UL (ref 0.05–1.2)
MONOCYTES NFR BLD: 8 % (ref 3–10)
NEUTS SEG # BLD: 2.9 K/UL (ref 1.8–8)
NEUTS SEG NFR BLD: 63 % (ref 40–73)
OPIATES UR QL: NEGATIVE
PCP UR QL: NEGATIVE
PLATELET # BLD AUTO: 185 K/UL (ref 135–420)
PMV BLD AUTO: 10.9 FL (ref 9.2–11.8)
POTASSIUM SERPL-SCNC: 3.4 MMOL/L (ref 3.5–5.5)
PROT SERPL-MCNC: 7.4 G/DL (ref 6.4–8.2)
RBC # BLD AUTO: 4.3 M/UL (ref 4.2–5.3)
SODIUM SERPL-SCNC: 137 MMOL/L (ref 136–145)
WBC # BLD AUTO: 4.6 K/UL (ref 4.6–13.2)

## 2019-12-17 PROCEDURE — 99284 EMERGENCY DEPT VISIT MOD MDM: CPT

## 2019-12-17 PROCEDURE — 80053 COMPREHEN METABOLIC PANEL: CPT

## 2019-12-17 PROCEDURE — 85025 COMPLETE CBC W/AUTO DIFF WBC: CPT

## 2019-12-17 PROCEDURE — 80307 DRUG TEST PRSMV CHEM ANLYZR: CPT

## 2019-12-17 PROCEDURE — 81025 URINE PREGNANCY TEST: CPT

## 2019-12-17 PROCEDURE — 74011250637 HC RX REV CODE- 250/637: Performed by: EMERGENCY MEDICINE

## 2019-12-17 RX ORDER — SIMVASTATIN 20 MG/1
20 TABLET, FILM COATED ORAL
Status: COMPLETED | OUTPATIENT
Start: 2019-12-17 | End: 2019-12-18

## 2019-12-17 RX ORDER — TRAZODONE HYDROCHLORIDE 50 MG/1
50 TABLET ORAL
Status: COMPLETED | OUTPATIENT
Start: 2019-12-17 | End: 2019-12-18

## 2019-12-17 RX ORDER — ACETAMINOPHEN 325 MG/1
650 TABLET ORAL
Status: COMPLETED | OUTPATIENT
Start: 2019-12-17 | End: 2019-12-17

## 2019-12-17 RX ADMIN — ACETAMINOPHEN 650 MG: 325 TABLET ORAL at 18:32

## 2019-12-17 NOTE — ED PROVIDER NOTES
EMERGENCY DEPARTMENT HISTORY AND PHYSICAL EXAM    3:12 PM  Date: 2019  Patient Name: Lavern Perkins    History of Presenting Illness     Chief Complaint   Patient presents with   [address] Problem       History Provided By: patient     HPI: Lavern Perkins is a 53 y.o. female with past medical history of depression, suicidal ideation presents with what she describes: \"A meltdown'. Patient went to see a counselor and then she became very tearful and upset. She states that she tried to commit suicide 6 months ago and sometimes she is still contemplating it. She has no plans. Patient is here with counselor from behavioral.         PCP: Bhavana Goode. Daphnie Jackson MD    Past History     Past Medical History:  Past Medical History:   Diagnosis Date    Anxiety     Cancer [facility])     kidney    Chronic kidney disease     Stage II    Depression     pt states anxiety    Eye twitch     Hypertension     Personal history of kidney cancer        Past Surgical History:  Past Surgical History:   Procedure Laterality Date    HX COLONOSCOPY      HX HYSTERECTOMY      HX NEPHRECTOMY Left     HX OOPHORECTOMY Right     HX OTHER SURGICAL      left nephrectomy       Family History:  History reviewed. No pertinent family history. Social History:  Social History     Tobacco Use    Smoking status: Former Smoker     Last attempt to quit: 2011     Years since quittin.0    Smokeless tobacco: Never Used   Substance Use Topics    Alcohol use: Yes     Frequency: Monthly or less    Drug use: No       Allergies: Allergies   Allergen Reactions    Seafood Hives and Swelling    Iodine Other (comments)    Nsaids (Non-Steroidal Anti-Inflammatory Drug) Other (comments)    Shellfish Derived Angioedema       Review of Systems   Review of Systems   Constitutional: Negative for activity change, appetite change and chills. HENT: Negative for congestion, ear discharge, ear pain and sore throat.     Eyes: Negative for photophobia and pain. Respiratory: Negative for cough and choking. Cardiovascular: Negative for palpitations and leg swelling. Gastrointestinal: Negative for anal bleeding and rectal pain. Endocrine: Negative for polydipsia and polyuria. Genitourinary: Negative for genital sores and urgency. Musculoskeletal: Negative for arthralgias and myalgias. Neurological: Negative for dizziness, seizures and speech difficulty. Psychiatric/Behavioral: Positive for suicidal ideas. Negative for hallucinations and self-injury. Physical Exam     Patient Vitals for the past 12 hrs:   Temp Pulse Resp BP SpO2   12/17/19 1347 99 °F (37.2 °C) 74 14 133/90 100 %       Physical Exam  Vitals signs and nursing note reviewed. Constitutional:       Appearance: She is well-developed. HENT:      Head: Normocephalic and atraumatic. Eyes:      General:         Right eye: No discharge. Left eye: No discharge. Neck:      Musculoskeletal: Normal range of motion and neck supple. Cardiovascular:      Rate and Rhythm: Normal rate and regular rhythm. Heart sounds: Normal heart sounds. No murmur. Pulmonary:      Effort: Pulmonary effort is normal. No respiratory distress. Breath sounds: Normal breath sounds. No stridor. No wheezing or rales. Chest:      Chest wall: No tenderness. Abdominal:      General: Bowel sounds are normal. There is no distension. Palpations: Abdomen is soft. Tenderness: There is no tenderness. There is no guarding or rebound. Musculoskeletal: Normal range of motion. Skin:     General: Skin is warm and dry. Neurological:      General: No focal deficit present. Mental Status: She is alert and oriented to person, place, and time.    Psychiatric:      Comments: Flat affect         Diagnostic Study Results     Labs -  Recent Results (from the past 12 hour(s))   DRUG SCREEN, URINE    Collection Time: 12/17/19  1:50 PM   Result Value Ref Range    BENZODIAZEPINES NEGATIVE  NEG      BARBITURATES NEGATIVE  NEG      THC (TH-CANNABINOL) NEGATIVE  NEG      OPIATES NEGATIVE  NEG      PCP(PHENCYCLIDINE) NEGATIVE  NEG      COCAINE NEGATIVE  NEG      AMPHETAMINES NEGATIVE  NEG      METHADONE NEGATIVE  NEG      HDSCOM (NOTE)    ETHYL ALCOHOL    Collection Time: 12/17/19  2:05 PM   Result Value Ref Range    ALCOHOL(ETHYL),SERUM <3 0 - 3 MG/DL       Radiologic Studies -   No results found. Medical Decision Making     ED Course: Progress Notes, Reevaluation, and Consults:    3:12 PM Initial assessment performed. The patients presenting problems have been discussed, and they/their family are in agreement with the care plan formulated and outlined with them. I have encouraged them to ask questions as they arise throughout their visit. Provider Notes (Medical Decision Making):   Patient with depression and suicidal ideation is accompanied by behavioral.  Patient has placement needs to be medically cleared    Labs unremarkable, vitals within normal limit  9:00 PM signed out to Dr. Adalberto Christianson pending placement in the morning      Vital Signs-Reviewed the patient's vital signs. Reviewed pt's pulse ox reading. Records Reviewed: old medical records (Time of Review: 3:12 PM)  -I am the first provider for this patient.  -I reviewed the vital signs, available nursing notes, past medical history, past surgical history, family history and social history. Current Outpatient Medications   Medication Sig Dispense Refill    magnesium hydroxide (MILK OF MAGNESIA) 2,400 mg/10 mL susp suspension Take 15 mL by mouth.  sertraline (ZOLOFT) 25 mg tablet Take  by mouth daily.  calcium-cholecalciferol, d3, (CALCIUM 600 + D) 600-125 mg-unit tab Take  by mouth.  docusate sodium (STOOL SOFTENER) 100 mg capsule Take 100 mg by mouth two (2) times a day.  albuterol (PROVENTIL HFA, VENTOLIN HFA, PROAIR HFA) 90 mcg/actuation inhaler 1-2 Puffs.       benzonatate (TESSALON) 100 mg capsule Take 1 capsule 3 times daily as needed for coughing, swallow capsules whole.  cetirizine (ZYRTEC) 10 mg tablet 10 mg.      ascorbic acid, vitamin C, (VITAMIN C) 500 mg tablet Take 500 mg by mouth daily.  multivitamin (ONE A DAY) tablet Take 1 Tab by mouth daily.  pyridoxine HCl, vitamin B6, (VITAMIN B-6 PO) Take  by mouth daily.  fish oil-omega-3 fatty acids 340-1,000 mg capsule Take 1 Cap by mouth daily.  FIBER, PSYLLIUM HUSK, PO Take  by mouth daily.  Ascorbic Acid-Multivits-Min (EMERGEN-C) 1,000 mg pwep Take  by mouth daily.  magnesium 250 mg tab Take  by mouth daily.  zinc 50 mg tab tablet Take 50 mg by mouth daily.  lactobacillus rhamnosus gg 15 billion cell (CULTURELLE) 15 billion cell capsule Take 1 Cap by mouth daily.  butalbital-acetaminophen-caff (FIORICET) -40 mg per capsule Take 1-2 Caps by mouth every six (6) hours as needed for Pain. Max Daily Amount: 8 Caps. 20 Cap 0    simvastatin (ZOCOR) 10 mg tablet Take 20 mg by mouth nightly.  losartan (COZAAR) 100 mg tablet Take 100 mg by mouth daily.  hydrochlorothiazide (HYDRODIURIL) 25 mg tablet Take 25 mg by mouth daily. Clinical Impression     Clinical Impression: No diagnosis found. Disposition: discharge      DISCHARGE NOTE:   Pt has been reexamined. Patient has no new complaints, changes, or physical findings. Care plan outlined and precautions discussed. Results were reviewed with the patient. All medications were reviewed with the patient; will d/c home with PMD f/u. All of pt's questions and concerns were addressed. Patient was instructed and agrees to follow up with PMD, as well as to return to the ED upon further deterioration. Patient is ready to go home. This note was dictated utilizing voice recognition software which may lead to typographical errors. I apologize in advance if the situation occurs.   If questions arise please do not hesitate to contact me or call our department. This note was dictated utilizing voice recognition software which may lead to typographical errors. I apologize in advance if the situation occurs. If questions arise please do not hesitate to contact me or call our department.     Rosangela Garcia MD  3:12 PM

## 2019-12-17 NOTE — ED NOTES
Patient laying in bed alert and oriented x3 no signs of distress noted at this time. Patient is attempting to get an accurate list of her medications from home.

## 2019-12-17 NOTE — ED TRIAGE NOTES
The patient presents for evaluation of depression and intermittent suicidal ideations since last month.

## 2019-12-18 PROCEDURE — 74011250637 HC RX REV CODE- 250/637: Performed by: EMERGENCY MEDICINE

## 2019-12-18 RX ADMIN — SIMVASTATIN 20 MG: 20 TABLET, FILM COATED ORAL at 00:44

## 2019-12-18 RX ADMIN — TRAZODONE HYDROCHLORIDE 50 MG: 50 TABLET ORAL at 00:43

## 2019-12-18 NOTE — PROGRESS NOTES
Received call from ED about medical transport to rehab at 9:00am on Wednesday. Lester Thapa stated she tried to arrange transport with Medicaid and it was denied coverage for a patient who is Suicidal.  Instructed to call Medicaid Hortencia Zapata at [place] when office is open again to arrange transport, as it is covered.       Esvin Borden, RN BSN  Care Manager  [phone]

## 2019-12-18 NOTE — ED NOTES
9:08 PM :Pt care assumed from Dr. Etelvina Villasenor , ED provider. Pt complaint(s), current treatment plan, progression and available diagnostic results have been discussed thoroughly. Rounding occurred: yes  Intended Disposition: ADMIT   Pending diagnostic reports and/or labs (please list): awaiting bed availability in the morning      The patient has remained stable while under my care after signout. The patient has been resting comfortably, is in NAD and their vitals have remained stable. Will continue to monitor patient as we await final disposition. 0700 AM : Pt care transferred to Dr. Lindajo Eisenmenger  ,ED provider. History of patient complaint(s), available diagnostic reports and current treatment plan has been discussed thoroughly. Bedside rounding on patient occured : yes . Intended disposition of patient : ADMIT  Pending diagnostics reports and/or labs (please list): plan for admission this morning to mental health.

## 2019-12-18 NOTE — ED NOTES
10:03 AM :Pt care assumed from Dr. Emmie Covarrubias , ED provider. Pt complaint(s), current treatment plan, progression and available diagnostic results have been discussed thoroughly.   Rounding occurred: yes  Intended Disposition: TBD   Pending diagnostic reports and/or labs (please list): pending bed placement for SI    [facility] will [facility]    Rawleigh Denver, MD

## 2020-09-03 ENCOUNTER — OFFICE VISIT (OUTPATIENT)
Dept: SURGERY | Age: 45
End: 2020-09-03

## 2020-09-03 DIAGNOSIS — E66.9 OBESITY, UNSPECIFIED CLASSIFICATION, UNSPECIFIED OBESITY TYPE, UNSPECIFIED WHETHER SERIOUS COMORBIDITY PRESENT: Primary | ICD-10-CM

## 2020-09-03 NOTE — PROGRESS NOTES
Patient attended a Medically Supervised Weight Loss New Patient Orientation today where we discussed:  - New Direction Very Low Calorie Diet details  - Medical Supervision  - Nutrition education  - Cost of Meal Replacements  - Policies and compliance required for program enrollment. Patients initial consultation with provider is tentatively scheduled for:  No future appointments. Patient attended orientation but did not stay to schedule initial consult. Patient wants to confirm insurance coverage prior to scheduling. Patient has 6 months from today to schedule initial consult or will need to repeat orientation.

## 2020-09-18 ENCOUNTER — HOSPITAL ENCOUNTER (OUTPATIENT)
Dept: LAB | Age: 45
Discharge: HOME | End: 2020-09-18
Payer: MEDICAID

## 2020-09-18 ENCOUNTER — OFFICE VISIT (OUTPATIENT)
Dept: SURGERY | Age: 45
End: 2020-09-18

## 2020-09-18 VITALS
HEIGHT: 64 IN | WEIGHT: 214.6 LBS | BODY MASS INDEX: 36.64 KG/M2 | RESPIRATION RATE: 18 BRPM | TEMPERATURE: 98.1 F | DIASTOLIC BLOOD PRESSURE: 93 MMHG | HEART RATE: 88 BPM | SYSTOLIC BLOOD PRESSURE: 127 MMHG | OXYGEN SATURATION: 98 %

## 2020-09-18 DIAGNOSIS — E78.5 HYPERLIPIDEMIA, UNSPECIFIED HYPERLIPIDEMIA TYPE: ICD-10-CM

## 2020-09-18 DIAGNOSIS — Z71.3 WEIGHT LOSS COUNSELING, ENCOUNTER FOR: ICD-10-CM

## 2020-09-18 DIAGNOSIS — E55.9 VITAMIN D DEFICIENCY: ICD-10-CM

## 2020-09-18 DIAGNOSIS — E66.01 SEVERE OBESITY (HCC): Primary | ICD-10-CM

## 2020-09-18 DIAGNOSIS — E66.01 SEVERE OBESITY (HCC): ICD-10-CM

## 2020-09-18 LAB
ATRIAL RATE: 61 BPM
CALCULATED P AXIS, ECG09: 45 DEGREES
CALCULATED R AXIS, ECG10: 9 DEGREES
CALCULATED T AXIS, ECG11: 39 DEGREES
DIAGNOSIS, 93000: NORMAL
P-R INTERVAL, ECG05: 154 MS
Q-T INTERVAL, ECG07: 436 MS
QRS DURATION, ECG06: 70 MS
QTC CALCULATION (BEZET), ECG08: 438 MS
SENTARA SPECIMEN COL,SENBCF: NORMAL
VENTRICULAR RATE, ECG03: 61 BPM

## 2020-09-18 PROCEDURE — 99001 SPECIMEN HANDLING PT-LAB: CPT

## 2020-09-18 PROCEDURE — 93005 ELECTROCARDIOGRAM TRACING: CPT

## 2020-09-18 RX ORDER — IRBESARTAN 300 MG/1
TABLET ORAL
COMMUNITY
Start: 2020-08-28 | End: 2022-04-12

## 2020-09-18 RX ORDER — SERTRALINE HYDROCHLORIDE 50 MG/1
100 TABLET, FILM COATED ORAL
COMMUNITY
Start: 2020-09-01 | End: 2021-12-02 | Stop reason: DRUGHIGH

## 2020-09-18 RX ORDER — ERGOCALCIFEROL 1.25 MG/1
CAPSULE ORAL
COMMUNITY
Start: 2020-07-28 | End: 2022-03-22

## 2020-09-18 RX ORDER — TIZANIDINE 4 MG/1
TABLET ORAL
COMMUNITY
Start: 2020-08-10 | End: 2022-04-12

## 2020-09-18 RX ORDER — LUBIPROSTONE 24 UG/1
24 CAPSULE, GELATIN COATED ORAL
COMMUNITY
Start: 2020-08-28

## 2020-09-18 RX ORDER — LIDOCAINE 50 MG/G
PATCH TOPICAL
COMMUNITY
Start: 2020-08-23 | End: 2022-08-22 | Stop reason: SDUPTHER

## 2020-09-18 RX ORDER — CICLOPIROX OLAMINE 7.7 MG/G
CREAM TOPICAL
COMMUNITY
Start: 2020-08-26

## 2020-09-18 RX ORDER — ARIPIPRAZOLE 2 MG/1
TABLET ORAL
COMMUNITY
Start: 2020-09-01 | End: 2021-01-18 | Stop reason: ALTCHOICE

## 2020-09-18 NOTE — PROGRESS NOTES
Cielo Tellez is a 50 y.o. female  Chief Complaint   Patient presents with    Weight Management     consult re-enter program     Pt ID confirmed    Weight Loss Metrics 9/18/2020 12/17/2019 6/14/2019 5/3/2019 3/22/2019 3/1/2019 2/1/2019   Today's Wt 214 lb 9.6 oz 175 lb 163 lb 6.4 oz 163 lb 161 lb 14.4 oz 164 lb 12.8 oz 162 lb 9.6 oz   BMI 36.84 kg/m2 30.04 kg/m2 31.91 kg/m2 27.98 kg/m2 27.79 kg/m2 28.29 kg/m2 27.91 kg/m2       Body mass index is 36.84 kg/m².

## 2020-09-18 NOTE — PATIENT INSTRUCTIONS
Monthly goal: -10lbs       4 meal replacements daily, no other food. First one within about 1 hour of waking up to get metabolism started. Don't go more than 6 hours between meals. No more than 1 soup a day, this is too much salt. No more than 1 bar a day, this not enough protein. You are allotted 10 carbs extra a day. Recommendations       - Consume your 4 daily meal replacements equally spaced over the day. Dont go more than 6 hours between each meal. Breakfast is especially important!      - Get the support of family and friends. - Snack-proof your home. - Have strategies for social situations, meetings that run over or vacation.       - If you fall off the plan; just start right back. Reflect on those days into examples of what to change or avoid next time. Program Compliance      We do not expect perfection. However, we do ask for your persistence and your willingness to do the work of growing yourself. You will hit plateaus in your weight loss. You will run into situations that test your will power. You will encounter times when you feel frustrated. How your respond to your slip ups and, the adjustments you make to prevent future slip ups will determine you long-term success. If you find yourself temporarily slipping in the program we will gently nudge you forward and encourage you to do the work of identifying and move beyond your stuck areas. However, if you find yourself wavering in the program for a prolonged time (more than 3 months) we will ask that you take a break from the program. At that time you will have 3 options:       1. Consult with one of our weight loss specialists to explore additional weight loss options. 2. Work with a counselor to do some focused work in order to identify and break the patterns that are holding you back. 3. A combination of both these.       Once you, your counselor and/or your weight loss specialist feel that you have moved past those patterns and want to give the program another chance, we will gladly work with you to determine if you're ready to start back in the program.      When returning after a break, if it has been over 3 months you will required to repeat an orientation and, if greater than 6 months, you will be required to repeat an orientation, labs and an EKG. Homework for [employer]        Exercise:   - Daily starting slow, gradually increase your time by 10- 20 % per week     - To prevent injury, take a recovery week every 4 weeks (reduce your exercise time and intensity by 1/2 during this time)     - Your goal is to work up to 150 min a week; hard enough that you can't whistle or sing. It may take 6 months to work up to this. Common Side Effects     -Constipation  -Fatigue  -Hunger  -Low sodium  -Hair Loss        1. Constipation: It can be prevented by Drinking at least 8-10 glasses of water a day, fiber, and exercise. If you're prone to constipation:  - Take a Stool Softener daily. Example: Dulcolax or Miralax   - Eat Plenty of Fiber                        Get the New Direction products with fiber added                        Keep your fiber intake to at least 20 grams a day                        Use SUGAR-FREE products: Fiber One, Benefiber or Metamucil     If you get constipated:  1. Start with a Stool Softener (produces a bowel movement in 72 hr)              2.  If you still have constipation after 2 to 3 days, add a Stimulant Laxative to the Stool Softener (results usually in 6-12 hr):  Examples:  Exlax (1 small square) for up to 4 days, Dulcolax stimulant laxative, or Magnesium citrate pill 500 mg start once a day and increase to 3 times a day if needed. 3. If conditions or symptoms persist contact your provider. 2.  Fatigue, most people experience this:  More common during the first 2 weeks, associated with your body adjusting to the Ketogenic diet.  If symptoms persist contact your provider. 3.  Hunger:  More common in the first few days often disappears after body adjusts to the Ketogenic diet. 4.  Low blood levels of sodium, less common:   If you develop a headache, feel fatigued, light-headed or dizziness try adding 1/2 cup of bouillon broth every day. If symptoms persist contact your provider. 5.  Hair loss, you may see more than a usual amount of hair in your brush or the shower drain:   This can happen with physical stress (surgery, trauma or calorie restriction) or psychological stress. Although is it very concerning, it is often temporary and will resolve within 3 months. Some people notice a benefit from taking a daily dose of Biotin 2,500 mcg and increasing their Fish Oil intake. If symptoms persist contact your provider. For further information on where carbs hide in our foods:  1. Our Registered Dietitians     2. Www. Atkins. com/tools     3. Read food labels     4. Books       - The Calorie [place] Crosser       - The [place] System Diet for a New You       - Radha Olsen's NEW Carb and Calorie 4th Edition (my favorite)     5. Smart phone and Internet-based apps       - [employer]        - Carb Manager     Helpful Information on behavior change:   Change Anything by Rocio Fountain, Peg Postal, and Lorenza Suarez     Mindless Eating by Caterina Raines     Perfectly Yourself by Molly Jernigan, Myself and I - 28 Days to Self-Love by Hamilton Listen       Long-term Healthy Weight / Body Fat  Different ways to determining your ideal weight:  1. Keep your waist to less than 1/2 of your height   2. Keep your % body fat to under 30% for female and under 20% if male  3.  Keep your BMI around 25      Fun Facts About Carbs     - The Typical American Diet = 450 grams a day     - The Reducing Phase of the VLCD = 40 grams a day     - Target for weight loss maintenance:                        - Eat no more than 30 grams per meal - Eat no more than 10 grams per snack (mid-morning and mid-afternoon snack)

## 2020-09-18 NOTE — PROGRESS NOTES
Consultation for Weight Loss  Sean Hewitt is a 50 y.o. female who comes into the office today to restart ND VLCD medical weight loss program.   She has tried a variety of unsupervised weight-loss attempts including self imposed, registered dietician, family physician and ND, but has yet to meet with lasting success. Today, the patient is  Height: 5' 4\" (162.6 cm) tall, Weight: 97.3 kg (214 lb 9.6 oz) lbs for a Body mass index is 36.84 kg/m². It is due to the patient's obesity, which is further complicated by hypertension  that the patient is now seeking out medically supervised VLCD. Ideal body weight: 54.7 kg (120 lb 9.5 oz)  Adjusted ideal body weight: 71.8 kg (158 lb 3.1 oz) (Based on [employer] Tables)      Wt Readings from Last 10 Encounters:   09/23/20 97.4 kg (214 lb 11.2 oz)   09/18/20 97.3 kg (214 lb 9.6 oz)   12/17/19 79.4 kg (175 lb)   06/14/19 74.1 kg (163 lb 6.4 oz)   05/03/19 73.9 kg (163 lb)   03/22/19 73.4 kg (161 lb 14.4 oz)   03/01/19 74.8 kg (164 lb 12.8 oz)   02/01/19 73.8 kg (162 lb 9.6 oz)   01/25/19 72.5 kg (159 lb 12.8 oz)   01/18/19 73.5 kg (162 lb 1.6 oz)       Weight Metrics 9/23/2020 9/23/2020 9/18/2020 12/17/2019 6/14/2019 6/14/2019 5/3/2019   Weight - 214 lb 11.2 oz 214 lb 9.6 oz 175 lb - 163 lb 6.4 oz -   Waist Measure Inches 38.75 - - - 30.5 - 30   Exercise Mins/week - - - - - - -   Body Fat % - - - - - - -   BMI - 40.57 kg/m2 36.84 kg/m2 30.04 kg/m2 - 31.91 kg/m2 -     History of binge eating: yes    History of purging: no    Major lifestyle changes: no   Other commitments: no   Any potential unsupportive: no     Has Colon Mt ever been told by a physician not to exercise: no    Does Colon Mt know of any reason they shouldn't exercise: no  If yes, why? Does Colon Mt have any food allergies or sensitivities: no      If female:  No LMP recorded. Patient has had a hysterectomy.     Past Medical History:   Diagnosis Date    Anxiety     Cancer [facility])     kidney    Chronic kidney disease     Stage II    Depression     pt states anxiety    Eye twitch     Hypertension     Personal history of kidney cancer        Past Surgical History:   Procedure Laterality Date    HX COLONOSCOPY      HX HYSTERECTOMY      HX NEPHRECTOMY Left     HX OOPHORECTOMY Right     HX OTHER SURGICAL      left nephrectomy       Current Outpatient Medications   Medication Sig Dispense Refill    ARIPiprazole (ABILIFY) 2 mg tablet TAKE 1/2 TAB BY MOUTH EVERY NIGHT FOR MOOD      lidocaine (LIDODERM) 5 % APPLY 1 PATCHES DAILY TO AFFECTED AREAS AS NEEDED FOR PAIN. 12 HOURS ON 12 HOURS OFF      sertraline (ZOLOFT) 50 mg tablet TAKE 1/2 TAB BY MOUTH DAILY FOR 4 DAYS THEN 1 TAB BY MOUTH DAILY FOR MOOD      irbesartan (AVAPRO) 300 mg tablet TAKE 1 TABLET BY MOUTH EVERY DAY FOR BLOOD PRESSURE      ciclopirox (LOPROX) 0.77 % topical cream APPLY TO AFFECTED AREA TWICE A DAY      tiZANidine (ZANAFLEX) 4 mg tablet TAKE 1 TABLET (4 MG) BY MOUTH EVERY 6 HOURS AS NEEDED NOT TO EXCEED 3 DOSES IN 24 HOURS      ergocalciferol (ERGOCALCIFEROL) 1,250 mcg (50,000 unit) capsule TAKE 1 CAP BY MOUTH TWICE WEEKLY      Amitiza 24 mcg capsule TAKE 1 CAPSULE BY MOUTH TWICE A DAY      magnesium hydroxide (MILK OF MAGNESIA) 2,400 mg/10 mL susp suspension Take 15 mL by mouth.  sertraline (ZOLOFT) 25 mg tablet Take  by mouth daily.  calcium-cholecalciferol, d3, (CALCIUM 600 + D) 600-125 mg-unit tab Take  by mouth.  docusate sodium (STOOL SOFTENER) 100 mg capsule Take 100 mg by mouth two (2) times a day.  albuterol (PROVENTIL HFA, VENTOLIN HFA, PROAIR HFA) 90 mcg/actuation inhaler 1-2 Puffs.  benzonatate (TESSALON) 100 mg capsule Take 1 capsule 3 times daily as needed for coughing, swallow capsules whole.  cetirizine (ZYRTEC) 10 mg tablet 10 mg.      ascorbic acid, vitamin C, (VITAMIN C) 500 mg tablet Take 500 mg by mouth daily.  multivitamin (ONE A DAY) tablet Take 1 Tab by mouth daily.  pyridoxine HCl, vitamin B6, (VITAMIN B-6 PO) Take  by mouth daily.  fish oil-omega-3 fatty acids 340-1,000 mg capsule Take 1 Cap by mouth daily.  FIBER, PSYLLIUM HUSK, PO Take  by mouth daily.  Ascorbic Acid-Multivits-Min (EMERGEN-C) 1,000 mg pwep Take  by mouth daily.  magnesium 250 mg tab Take  by mouth daily.  zinc 50 mg tab tablet Take 50 mg by mouth daily.  lactobacillus rhamnosus gg 15 billion cell (CULTURELLE) 15 billion cell capsule Take 1 Cap by mouth daily.  simvastatin (ZOCOR) 10 mg tablet Take 20 mg by mouth nightly.  losartan (COZAAR) 100 mg tablet Take 100 mg by mouth daily.  hydrochlorothiazide (HYDRODIURIL) 25 mg tablet Take 25 mg by mouth daily.  butalbital-acetaminophen-caff (FIORICET) -40 mg per capsule Take 1-2 Caps by mouth every six (6) hours as needed for Pain. Max Daily Amount: 8 Caps. 20 Cap 0       Allergies   Allergen Reactions    Seafood Hives and Swelling    Iodine Other (comments)    Nsaids (Non-Steroidal Anti-Inflammatory Drug) Other (comments)    Shellfish Derived Angioedema       Social History     Tobacco Use    Smoking status: Former Smoker     Last attempt to quit: 2011     Years since quittin.8    Smokeless tobacco: Never Used   Substance Use Topics    Alcohol use: Yes     Frequency: Monthly or less    Drug use: No       No family history on file. No family status information on file.        Review of Systems:   Positive in BOLD  CONST: Fever, weight loss, fatigue or chills  GI: Nausea, vomiting, abdominal pain, change in bowel habits, hematochezia, melena, and GERD   INTEG: Dermatitis, abnormal moles  HEENT: Recent changes in vision, vertigo, epistaxis, dysphagia and hoarseness  CV: Chest pain, palpitations, HTN, edema and varicosities  RESP: Cough, shortness of breath, wheezing, hemoptysis, snoring and reactive airway disease  : Hematuria, dysuria, frequency, urgency, nocturia and stress urinary incontinence   MS: Weakness, joint pain and arthritis  ENDO: Diabetes, thyroid disease, polyuria, polydipsia, polyphagia, poor wound healing, heat intolerance, cold intolerance  LYMPH/HEME: Anemia, bruising and history of blood transfusions  NEURO: Dizziness, headache, fainting, seizures and stroke  PSYCH: Anxiety and depression      Physical Exam    Visit Vitals  BP (!) 127/93   Pulse 88   Temp 98.1 °F (36.7 °C) (Temporal)   Resp 18   Ht 5' 4\" (1.626 m)   Wt 97.3 kg (214 lb 9.6 oz)   SpO2 98%   BMI 36.84 kg/m²         Physical Exam  Vitals signs and nursing note reviewed. Constitutional:       Appearance: She is obese. HENT:      Head: Normocephalic. Eyes:      Pupils: Pupils are equal, round, and reactive to light. Cardiovascular:      Rate and Rhythm: Normal rate. Heart sounds: Normal heart sounds. Pulmonary:      Effort: Pulmonary effort is normal.      Breath sounds: Normal breath sounds. Abdominal:      General: There is no distension. Palpations: Abdomen is soft. There is no mass. Tenderness: There is no abdominal tenderness. Hernia: No hernia is present. Musculoskeletal: Normal range of motion. Skin:     General: Skin is warm and dry. Neurological:      Mental Status: She is alert and oriented to person, place, and time. Psychiatric:         Mood and Affect: Mood and affect normal.         Behavior: Behavior normal.         Lab results reviewed. For significant abnormal values and values requiring intervention, see assessment and plan.      9/18/2020  2:31 PM - [place], Card Result In     Component  Value  Ref Range & Units  Status    Ventricular Rate  61  BPM  Final    Atrial Rate  61  BPM  Final    P-R Interval  154  ms  Final    QRS Duration  70  ms  Final    Q-T Interval  436  ms  Final    QTC Calculation (Bezet)  438  ms  Final    Calculated P Axis  45  degrees  Final    Calculated R Axis  9  degrees  Final    Calculated T Axis  39  degrees  Final    Diagnosis    Final Normal sinus rhythm   Possible Left atrial enlargement   Nonspecific T wave abnormality   Abnormal ECG   When compared with ECG of 27-AUG-2014 11:44,   No significant change was found   Confirmed by Vicky Machado MD, --- ([id number]) on 9/18/2020 2:31:21 PM        Assessment/Plan  Chris Moreno is a 50 y.o. female who is suffering from obesity with a BMI of 40.57  and comorbidities including hypertension  who would benefit from weight loss. ICD-10-CM ICD-9-CM    1. Severe obesity (HCC)  E66.01 278.01 MAGNESIUM      METABOLIC PANEL, COMPREHENSIVE      EKG, 12 LEAD, INITIAL   2. Hyperlipidemia, unspecified hyperlipidemia type  E78.5 272.4 MAGNESIUM      METABOLIC PANEL, COMPREHENSIVE      EKG, 12 LEAD, INITIAL   3. Vitamin D deficiency  E55.9 268.9 MAGNESIUM      METABOLIC PANEL, COMPREHENSIVE      EKG, 12 LEAD, INITIAL   4. BMI 36.0-36.9,adult  Z68.36 V85.36 MAGNESIUM      METABOLIC PANEL, COMPREHENSIVE      EKG, 12 LEAD, INITIAL   5. Weight loss counseling, encounter for  Z71.3 V65.3        Diet regimen   # of meal replacements prescribed: 4    If modified LCD-nutritional guidelines:    Monthly Goal   -10lb     Medical monitoring schedule:   Weekly BP/Weight checks   Monthly provider appointments              Monthly CMP, uric acid checks      I have reviewed/discussed the above normal BMI with the patient. I have recommended the following interventions: dietary management education, guidance, and counseling, encourage exercise, monitor weight and prescribed dietary intake . .      Ms. Marilou Berger has a reminder for a \"due or due soon\" health maintenance. I have asked that she contact her primary care provider for follow-up on this health maintenance.     JHONY Cabezas-BC

## 2020-09-19 LAB
A-G RATIO,AGRAT: 2 RATIO (ref 1.1–2.6)
ALBUMIN SERPL-MCNC: 4.9 G/DL (ref 3.5–5)
ALP SERPL-CCNC: 76 U/L (ref 25–115)
ALT SERPL-CCNC: 24 U/L (ref 5–40)
ANION GAP SERPL CALC-SCNC: 11.3 MMOL/L (ref 3–15)
AST SERPL W P-5'-P-CCNC: 22 U/L (ref 10–37)
BILIRUB SERPL-MCNC: 0.7 MG/DL (ref 0.2–1.2)
BUN SERPL-MCNC: 17 MG/DL (ref 6–22)
CALCIUM SERPL-MCNC: 9.6 MG/DL (ref 8.4–10.5)
CHLORIDE SERPL-SCNC: 97 MMOL/L (ref 98–110)
CO2 SERPL-SCNC: 32 MMOL/L (ref 20–32)
CREAT SERPL-MCNC: 0.9 MG/DL (ref 0.5–1.2)
GFRAA, 66117: >60
GFRNA, 66118: >60
GLOBULIN,GLOB: 2.4 G/DL (ref 2–4)
GLUCOSE SERPL-MCNC: 97 MG/DL (ref 70–99)
MAGNESIUM SERPL-MCNC: 2.4 MG/DL (ref 1.6–2.5)
POTASSIUM SERPL-SCNC: 3.7 MMOL/L (ref 3.5–5.5)
PROT SERPL-MCNC: 7.3 G/DL (ref 6.4–8.3)
SODIUM SERPL-SCNC: 140 MMOL/L (ref 133–145)

## 2020-09-23 ENCOUNTER — CLINICAL SUPPORT (OUTPATIENT)
Dept: SURGERY | Age: 45
End: 2020-09-23

## 2020-09-23 VITALS
WEIGHT: 214.7 LBS | OXYGEN SATURATION: 98 % | SYSTOLIC BLOOD PRESSURE: 130 MMHG | TEMPERATURE: 97.6 F | DIASTOLIC BLOOD PRESSURE: 84 MMHG | BODY MASS INDEX: 40.54 KG/M2 | HEIGHT: 61 IN | HEART RATE: 72 BPM

## 2020-09-23 DIAGNOSIS — E66.9 OBESITY, UNSPECIFIED CLASSIFICATION, UNSPECIFIED OBESITY TYPE, UNSPECIFIED WHETHER SERIOUS COMORBIDITY PRESENT: Primary | ICD-10-CM

## 2020-09-23 NOTE — PROGRESS NOTES
Chief Complaint   Patient presents with    Weight Management     Patient attended a weekly class as part of the Medically Supervised Weight Loss Program.  This class was facilitated by a Registered Dietitian. Topics taught at class focused on diet, particularly carbohydrate counting and portion control. Classes also focused on behavior changes and the importance of establishing a daily exercise routine. Progress Note: Weekly Medical Monitoring in the [facility] Weight Loss Program    Is there anything that you or the patient needs to let the supervising provider know about? Yes stopped taking abilify having problems with snacking    Over the past week, have you experienced any side-effects? no    Cielo Tellez is a 50 y.o. female who is enrolled in [facility] Program    Cielo Tellez was prescribed the VLCD / LCD. Visit Vitals  Ht 5' 1\" (1.549 m)   Wt 97.4 kg (214 lb 11.2 oz)   BMI 40.57 kg/m²     Weight Metrics 9/23/2020 9/23/2020 9/18/2020 12/17/2019 6/14/2019 6/14/2019 5/3/2019   Weight - 214 lb 11.2 oz 214 lb 9.6 oz 175 lb - 163 lb 6.4 oz -   Waist Measure Inches 38.75 - - - 30.5 - 30   Exercise Mins/week - - - - - - -   Body Fat % - - - - - - -   BMI - 40.57 kg/m2 36.84 kg/m2 30.04 kg/m2 - 31.91 kg/m2 -         Have you received any other medical care this week? yes  If yes, where and for what? Er for headacheHave you had any change in your medications since your last visit? yes  If yes what? Stopped abilify and new rx for blood pressure not sure what med is    Did you have any problems adhering to the program last week? yes  If yes, please explain:       Eating Habits Over Last Week:  Did you take in 64 oz of non-caloric fluids? yes    Did you consume your prescribed meal replacement regimen each day?  yes       Physical Activity Over the Past Week:    Aerobic exercise: 0 min  Resistance exercise: 0 workouts / week

## 2020-09-23 NOTE — PROGRESS NOTES
Called patient, could not leave voice message due to not set up. Patient may start ND program and can purchase [employer] Meal Replacements.

## 2020-09-30 ENCOUNTER — OFFICE VISIT (OUTPATIENT)
Dept: SURGERY | Age: 45
End: 2020-09-30

## 2020-09-30 VITALS
TEMPERATURE: 97.6 F | BODY MASS INDEX: 40.54 KG/M2 | SYSTOLIC BLOOD PRESSURE: 126 MMHG | WEIGHT: 206.5 LBS | HEART RATE: 76 BPM | DIASTOLIC BLOOD PRESSURE: 84 MMHG | HEIGHT: 60 IN

## 2020-09-30 DIAGNOSIS — E66.9 OBESITY, UNSPECIFIED CLASSIFICATION, UNSPECIFIED OBESITY TYPE, UNSPECIFIED WHETHER SERIOUS COMORBIDITY PRESENT: Primary | ICD-10-CM

## 2020-09-30 NOTE — PROGRESS NOTES
Patient attended a weekly class as part of the Medically Supervised Weight Loss Program.  This class was facilitated by a Registered Dietitian. Topics taught at class focused on diet, particularly carbohydrate counting and portion control. Classes also focused on behavior changes and the importance of establishing a daily exercise routine. Progress Note: Weekly Medical Monitoring in the [facility] Loss Program    Is there anything that you or the patient needs to let the supervising provider know about? no    Over the past week, have you experienced any side-effects? no    Dangelo Robert is a 50 y.o. female who is enrolled in [facility] Loss Program    Dangelo Robert was prescribed the VLCD / LCD. Visit Vitals  Pulse 76   Temp 97.6 °F (36.4 °C)   Ht 5' (1.524 m)   Wt 93.7 kg (206 lb 8 oz)   BMI 40.33 kg/m²     Weight Metrics 9/30/2020 9/23/2020 9/23/2020 9/18/2020 12/17/2019 6/14/2019 6/14/2019   Weight 206 lb 8 oz - 214 lb 11.2 oz 214 lb 9.6 oz 175 lb - 163 lb 6.4 oz   Waist Measure Inches - 38.75 - - - 30.5 -   Exercise Mins/week - - - - - - -   Body Fat % - - - - - - -   BMI 40.33 kg/m2 - 40.57 kg/m2 36.84 kg/m2 30.04 kg/m2 - 31.91 kg/m2         Have you received any other medical care this week? yes  If yes, where and for what? Gastro no changes in medications also gyn    Have you had any change in your medications since your last visit? no  If yes what? Did you have any problems adhering to the program last week? no  If yes, please explain:       Eating Habits Over Last Week:  Did you take in 64 oz of non-caloric fluids? yes     Did you consume your prescribed meal replacement regimen each day?  yes       Physical Activity Over the Past Week:    Aerobic exercise: 210 min  Resistance exercise: 0 workouts / week

## 2020-10-07 ENCOUNTER — OFFICE VISIT (OUTPATIENT)
Dept: SURGERY | Age: 45
End: 2020-10-07

## 2020-10-07 VITALS
HEART RATE: 72 BPM | SYSTOLIC BLOOD PRESSURE: 138 MMHG | TEMPERATURE: 97.3 F | DIASTOLIC BLOOD PRESSURE: 86 MMHG | HEIGHT: 61 IN | WEIGHT: 205.7 LBS | BODY MASS INDEX: 38.83 KG/M2

## 2020-10-07 DIAGNOSIS — E66.9 OBESITY, UNSPECIFIED CLASSIFICATION, UNSPECIFIED OBESITY TYPE, UNSPECIFIED WHETHER SERIOUS COMORBIDITY PRESENT: Primary | ICD-10-CM

## 2020-10-07 NOTE — PROGRESS NOTES
Patient attended a weekly class as part of the Medically Supervised Weight Loss Program.  This class was facilitated by a Registered Dietitian. Topics taught at class focused on diet, particularly carbohydrate counting and portion control. Classes also focused on behavior changes and the importance of establishing a daily exercise routine. Progress Note: Weekly Medical Monitoring in the [facility] Weight Loss Program    Is there anything that you or the patient needs to let the supervising provider know about? no    Over the past week, have you experienced any side-effects? no    Monserrat Rojas is a 50 y.o. female who is enrolled in [facility]    Monserrat Rojas was prescribed the VLCD / LCD. Visit Vitals  /86   Pulse 72   Temp 97.3 °F (36.3 °C)   Ht 5' 1\" (1.549 m)   Wt 93.3 kg (205 lb 11.2 oz)   BMI 38.87 kg/m²     Weight Metrics 10/7/2020 10/7/2020 9/30/2020 9/30/2020 9/23/2020 9/23/2020 9/18/2020   Weight - 205 lb 11.2 oz - 206 lb 8 oz - 214 lb 11.2 oz 214 lb 9.6 oz   Waist Measure Inches 37.5 - 37 - 38.75 - -   Exercise Mins/week - - - - - - -   Body Fat % - - - - - - -   BMI - 38.87 kg/m2 - 40.33 kg/m2 - 40.57 kg/m2 36.84 kg/m2         Have you received any other medical care this week? yes  If yes, where and for what? Patient states medication changes    Have you had any change in your medications since your last visit? yes  If yes what? abilify discontinued and started on risperdal    Did you have any problems adhering to the program last week? no  If yes, please explain:       Eating Habits Over Last Week:  Did you take in 64 oz of non-caloric fluids? yes     Did you consume your prescribed meal replacement regimen each day?  yes       Physical Activity Over the Past Week:    Aerobic exercise: 5 hours  Resistance exercise: 0 workouts / week

## 2020-10-16 ENCOUNTER — OFFICE VISIT (OUTPATIENT)
Dept: SURGERY | Age: 45
End: 2020-10-16
Payer: MEDICAID

## 2020-10-16 VITALS
BODY MASS INDEX: 40.21 KG/M2 | HEIGHT: 60 IN | RESPIRATION RATE: 18 BRPM | TEMPERATURE: 97.5 F | HEART RATE: 80 BPM | WEIGHT: 204.8 LBS | OXYGEN SATURATION: 98 % | DIASTOLIC BLOOD PRESSURE: 88 MMHG | SYSTOLIC BLOOD PRESSURE: 126 MMHG

## 2020-10-16 DIAGNOSIS — Z71.3 WEIGHT LOSS COUNSELING, ENCOUNTER FOR: ICD-10-CM

## 2020-10-16 DIAGNOSIS — E78.5 HYPERLIPIDEMIA, UNSPECIFIED HYPERLIPIDEMIA TYPE: ICD-10-CM

## 2020-10-16 DIAGNOSIS — E66.01 MORBID (SEVERE) OBESITY DUE TO EXCESS CALORIES (HCC): ICD-10-CM

## 2020-10-16 DIAGNOSIS — E66.01 MORBID (SEVERE) OBESITY DUE TO EXCESS CALORIES (HCC): Primary | ICD-10-CM

## 2020-10-16 PROCEDURE — 99213 OFFICE O/P EST LOW 20 MIN: CPT | Performed by: NURSE PRACTITIONER

## 2020-10-16 RX ORDER — TRAZODONE HYDROCHLORIDE 50 MG/1
75 TABLET ORAL
COMMUNITY
Start: 2019-12-30 | End: 2022-09-29

## 2020-10-16 RX ORDER — RISPERIDONE 1 MG/1
TABLET, FILM COATED ORAL
COMMUNITY
Start: 2020-10-13 | End: 2021-01-18 | Stop reason: ALTCHOICE

## 2020-10-16 RX ORDER — IRBESARTAN AND HYDROCHLOROTHIAZIDE 300; 12.5 MG/1; MG/1
TABLET, FILM COATED ORAL
COMMUNITY
Start: 2020-10-15 | End: 2021-12-02

## 2020-10-16 NOTE — PROGRESS NOTES
Monserrat Rojas is a 50 y.o. female  Chief Complaint   Patient presents with    Weight Management     monthly follow up     Nursing Note for Medical Monitoring in the [facility] Weight Loss Program      Monserrat Rojas is a 50 y.o. female who is enrolled in [facility] Weight Loss Program    Monserrat Rojas was prescribed the VLCD / LCD. Did you have any problems adhering to the program last week? no  If yes, please explain:     Since your last visit, have you experienced any complications? no    Have you received any other medical care this week? no  If yes, where and for what? Have you had any change in your medications since your last visit? yes  If yes what? \"not taking ambilify anymore. Added trazadone and rispirdol\"    Are you taking an appetite suppressant? no   If yes:  Do you need a refill? BP Readings from Last 3 Encounters:   10/16/20 126/88   10/07/20 138/86   09/30/20 126/84        Eating Habits Over Last Week:  Did you take in 64 oz of non-caloric fluids? yes     Did you consume your prescribed meal replacement regimen each day?  yes       Physical Activity Over the Past Week:    Aerobic exercise: 210 min  Resistance exercise: no workouts / week

## 2020-10-16 NOTE — PROGRESS NOTES
[facility] Progress Note:   F/up Provider Visit    CC: Weight Management    Elaine Stanley is a 50 y.o. female who is here for her  f/up medical provider visit for the VLCD Program. She is attending zoom classes. -10lbs this month, denies SE. Weight History  Weight Metrics 11/4/2020 11/4/2020 10/28/2020 10/28/2020 10/21/2020 10/16/2020 10/16/2020   Weight - 205 lb 6.4 oz - 204 lb 3.2 oz 207 lb - 204 lb 12.8 oz   Waist Measure Inches 35.75 - 37.5 - 46 36.5 -   Exercise Mins/week - - - - - - -   Body Fat % - - - - - - -   BMI - 38.81 kg/m2 - 38.58 kg/m2 39.11 kg/m2 - 40 kg/m2       Starting wt: 214  Goal wt: 158  EKG due: 164  Ideal body weight: 45.5 kg (100 lb 4.9 oz)  Adjusted ideal body weight: 64.5 kg (142 lb 1.7 oz)  Body mass index is 40 kg/m². History    Past Medical History:   Diagnosis Date    Anxiety     Cancer ([facility] [address]     kidney    Chronic kidney disease     Stage II    Depression     pt states anxiety    Eye twitch     Hypertension     Personal history of kidney cancer        Past Surgical History:   Procedure Laterality Date    HX COLONOSCOPY      HX HYSTERECTOMY      HX NEPHRECTOMY Left     HX OOPHORECTOMY Right     HX OTHER SURGICAL      left nephrectomy       Current Outpatient Medications   Medication Sig Dispense Refill    irbesartan-hydroCHLOROthiazide (AVALIDE) 300-12.5 mg per tablet       risperiDONE (RisperDAL) 1 mg tablet       traZODone (DESYREL) 50 mg tablet Take 75 mg by mouth nightly.       lidocaine (LIDODERM) 5 % APPLY 1 PATCHES DAILY TO AFFECTED AREAS AS NEEDED FOR PAIN. 12 HOURS ON 12 HOURS OFF      sertraline (ZOLOFT) 50 mg tablet TAKE 1/2 TAB BY MOUTH DAILY FOR 4 DAYS THEN 1 TAB BY MOUTH DAILY FOR MOOD      ciclopirox (LOPROX) 0.77 % topical cream APPLY TO AFFECTED AREA TWICE A DAY      tiZANidine (ZANAFLEX) 4 mg tablet TAKE 1 TABLET (4 MG) BY MOUTH EVERY 6 HOURS AS NEEDED NOT TO EXCEED 3 DOSES IN 24 HOURS      ergocalciferol (ERGOCALCIFEROL) 1,250 mcg (50,000 unit) capsule TAKE 1 CAP BY MOUTH TWICE WEEKLY      Amitiza 24 mcg capsule TAKE 1 CAPSULE BY MOUTH TWICE A DAY      magnesium hydroxide (MILK OF MAGNESIA) 2,400 mg/10 mL susp suspension Take 15 mL by mouth.  calcium-cholecalciferol, d3, (CALCIUM 600 + D) 600-125 mg-unit tab Take  by mouth.  docusate sodium (STOOL SOFTENER) 100 mg capsule Take 100 mg by mouth two (2) times a day.  albuterol (PROVENTIL HFA, VENTOLIN HFA, PROAIR HFA) 90 mcg/actuation inhaler 1-2 Puffs.  benzonatate (TESSALON) 100 mg capsule Take 1 capsule 3 times daily as needed for coughing, swallow capsules whole.  cetirizine (ZYRTEC) 10 mg tablet 10 mg.      ascorbic acid, vitamin C, (VITAMIN C) 500 mg tablet Take 500 mg by mouth daily.  multivitamin (ONE A DAY) tablet Take 1 Tab by mouth daily.  pyridoxine HCl, vitamin B6, (VITAMIN B-6 PO) Take  by mouth daily.  fish oil-omega-3 fatty acids 340-1,000 mg capsule Take 1 Cap by mouth daily.  FIBER, PSYLLIUM HUSK, PO Take  by mouth daily.  Ascorbic Acid-Multivits-Min (EMERGEN-C) 1,000 mg pwep Take  by mouth daily.  magnesium 250 mg tab Take  by mouth daily.  zinc 50 mg tab tablet Take 50 mg by mouth daily.  lactobacillus rhamnosus gg 15 billion cell (CULTURELLE) 15 billion cell capsule Take 1 Cap by mouth daily.  butalbital-acetaminophen-caff (FIORICET) -40 mg per capsule Take 1-2 Caps by mouth every six (6) hours as needed for Pain. Max Daily Amount: 8 Caps. 20 Cap 0    simvastatin (ZOCOR) 10 mg tablet Take 20 mg by mouth nightly.  ARIPiprazole (ABILIFY) 2 mg tablet TAKE 1/2 TAB BY MOUTH EVERY NIGHT FOR MOOD      irbesartan (AVAPRO) 300 mg tablet TAKE 1 TABLET BY MOUTH EVERY DAY FOR BLOOD PRESSURE      sertraline (ZOLOFT) 25 mg tablet Take  by mouth daily.  losartan (COZAAR) 100 mg tablet Take 100 mg by mouth daily.       hydrochlorothiazide (HYDRODIURIL) 25 mg tablet Take 25 mg by mouth daily. Allergies   Allergen Reactions    Seafood Hives and Swelling    Iodine Other (comments)    Nsaids (Non-Steroidal Anti-Inflammatory Drug) Other (comments)    Shellfish Derived Angioedema       Social History     Tobacco Use    Smoking status: Former Smoker     Last attempt to quit: 2011     Years since quittin.9    Smokeless tobacco: Never Used   Substance Use Topics    Alcohol use: Yes     Frequency: Monthly or less    Drug use: No       No family history on file. No family status information on file. Medications:  Outpatient Medications Marked as Taking for the 10/16/20 encounter (Office Visit) with Jaren Mcnair NP   Medication Sig Dispense Refill    irbesartan-hydroCHLOROthiazide (AVALIDE) 300-12.5 mg per tablet       risperiDONE (RisperDAL) 1 mg tablet       traZODone (DESYREL) 50 mg tablet Take 75 mg by mouth nightly.  lidocaine (LIDODERM) 5 % APPLY 1 PATCHES DAILY TO AFFECTED AREAS AS NEEDED FOR PAIN. 12 HOURS ON 12 HOURS OFF      sertraline (ZOLOFT) 50 mg tablet TAKE 1/2 TAB BY MOUTH DAILY FOR 4 DAYS THEN 1 TAB BY MOUTH DAILY FOR MOOD      ciclopirox (LOPROX) 0.77 % topical cream APPLY TO AFFECTED AREA TWICE A DAY      tiZANidine (ZANAFLEX) 4 mg tablet TAKE 1 TABLET (4 MG) BY MOUTH EVERY 6 HOURS AS NEEDED NOT TO EXCEED 3 DOSES IN 24 HOURS      ergocalciferol (ERGOCALCIFEROL) 1,250 mcg (50,000 unit) capsule TAKE 1 CAP BY MOUTH TWICE WEEKLY      Amitiza 24 mcg capsule TAKE 1 CAPSULE BY MOUTH TWICE A DAY      magnesium hydroxide (MILK OF MAGNESIA) 2,400 mg/10 mL susp suspension Take 15 mL by mouth.  calcium-cholecalciferol, d3, (CALCIUM 600 + D) 600-125 mg-unit tab Take  by mouth.  docusate sodium (STOOL SOFTENER) 100 mg capsule Take 100 mg by mouth two (2) times a day.  albuterol (PROVENTIL HFA, VENTOLIN HFA, PROAIR HFA) 90 mcg/actuation inhaler 1-2 Puffs.       benzonatate (TESSALON) 100 mg capsule Take 1 capsule 3 times daily as needed for coughing, swallow capsules whole.  cetirizine (ZYRTEC) 10 mg tablet 10 mg.      ascorbic acid, vitamin C, (VITAMIN C) 500 mg tablet Take 500 mg by mouth daily.  multivitamin (ONE A DAY) tablet Take 1 Tab by mouth daily.  pyridoxine HCl, vitamin B6, (VITAMIN B-6 PO) Take  by mouth daily.  fish oil-omega-3 fatty acids 340-1,000 mg capsule Take 1 Cap by mouth daily.  FIBER, PSYLLIUM HUSK, PO Take  by mouth daily.  Ascorbic Acid-Multivits-Min (EMERGEN-C) 1,000 mg pwep Take  by mouth daily.  magnesium 250 mg tab Take  by mouth daily.  zinc 50 mg tab tablet Take 50 mg by mouth daily.  lactobacillus rhamnosus gg 15 billion cell (CULTURELLE) 15 billion cell capsule Take 1 Cap by mouth daily.  butalbital-acetaminophen-caff (FIORICET) -40 mg per capsule Take 1-2 Caps by mouth every six (6) hours as needed for Pain. Max Daily Amount: 8 Caps. 20 Cap 0    simvastatin (ZOCOR) 10 mg tablet Take 20 mg by mouth nightly. Review of Systems  Review of Systems   Constitutional: Positive for weight loss. All other systems reviewed and are negative. Objective  Visit Vitals  /88   Pulse 80   Temp 97.5 °F (36.4 °C) (Temporal)   Resp 18   Ht 5' (1.524 m)   Wt 92.9 kg (204 lb 12.8 oz)   SpO2 98%   BMI 40.00 kg/m²     No LMP recorded. Patient has had a hysterectomy. Physical Exam  Physical Exam  Vitals signs and nursing note reviewed. Constitutional:       Appearance: She is well-developed. HENT:      Head: Normocephalic. Cardiovascular:      Rate and Rhythm: Normal rate. Heart sounds: Normal heart sounds. Pulmonary:      Effort: Pulmonary effort is normal.      Breath sounds: Normal breath sounds. Musculoskeletal: Normal range of motion. Skin:     General: Skin is warm and dry. Neurological:      Mental Status: She is alert and oriented to person, place, and time.            No results found for this or any previous visit (from the past 672 hour(s)). Assessment / Plan    Encounter Diagnoses   Name Primary?  Morbid (severe) obesity due to excess calories ([facility] [address] Yes    Hyperlipidemia, unspecified hyperlipidemia type     Weight loss counseling, encounter for            1.  Weight management      Today, the patient is  Height: 5' (152.4 cm) tall, Weight: 92.9 kg (204 lb 12.8 oz) lbs for a Body mass index is 40 kg/m². is suffering from obesity  which is further complicated by hyperlipidemia. Degree of control: doing great, continue current plan    Progress was reviewed with patient. Goal(s) for next appointment:   -10lbs     I have reviewed/discussed the above normal BMI with the patient. I have recommended the following interventions: dietary management education, guidance, and counseling, encourage exercise, monitor weight and prescribed dietary intake . Collette Ruts 2.  Labs    Latest results reviewed with patient   Routine monitoring labs ordered      SEAN Avalos     Dragon medical dictation software was used for portions of this report. Unintended transcription errors may occur.

## 2020-10-21 ENCOUNTER — OFFICE VISIT (OUTPATIENT)
Dept: SURGERY | Age: 45
End: 2020-10-21

## 2020-10-21 VITALS
TEMPERATURE: 98.2 F | WEIGHT: 207 LBS | HEIGHT: 61 IN | DIASTOLIC BLOOD PRESSURE: 84 MMHG | SYSTOLIC BLOOD PRESSURE: 122 MMHG | BODY MASS INDEX: 39.08 KG/M2 | HEART RATE: 90 BPM

## 2020-10-21 DIAGNOSIS — E66.9 OBESITY, UNSPECIFIED CLASSIFICATION, UNSPECIFIED OBESITY TYPE, UNSPECIFIED WHETHER SERIOUS COMORBIDITY PRESENT: Primary | ICD-10-CM

## 2020-10-21 NOTE — PROGRESS NOTES
Patient attended a weekly class as part of the Medically Supervised Weight Loss Program.  This class was facilitated by a Registered Dietitian. Topics taught at class focused on diet, particularly carbohydrate counting and portion control. Classes also focused on behavior changes and the importance of establishing a daily exercise routine. Progress Note: Weekly Medical Monitoring in the [facility] Program    Is there anything that you or the patient needs to let the supervising provider know about? no    Over the past week, have you experienced any side-effects? no    Shannon Muniz is a 50 y.o. female who is enrolled in [facility]    Shannon Muniz was prescribed the VLCD / LCD. Visit Vitals  Temp 98.2 °F (36.8 °C)   Ht 5' 1\" (1.549 m)   Wt 93.9 kg (207 lb)   BMI 39.11 kg/m²     Weight Metrics 10/21/2020 10/16/2020 10/16/2020 10/7/2020 10/7/2020 9/30/2020 9/30/2020   Weight 207 lb - 204 lb 12.8 oz - 205 lb 11.2 oz - 206 lb 8 oz   Waist Measure Inches 46 36.5 - 37.5 - 37 -   Exercise Mins/week - - - - - - -   Body Fat % - - - - - - -   BMI 39.11 kg/m2 - 40 kg/m2 - 38.87 kg/m2 - 40.33 kg/m2         Have you received any other medical care this week? yes  If yes, where and for what? Saw Psych and medicaions adjusted off abilify placed risperdial    Have you had any change in your medications since your last visit? yes  If yes what? Did you have any problems adhering to the program last week? no  If yes, please explain:       Eating Habits Over Last Week:  Did you take in 64 oz of non-caloric fluids? yes     Did you consume your prescribed meal replacement regimen each day?  yes       Physical Activity Over the Past Week:    Aerobic exercise: 180 min  Resistance exercise: 0 workouts / week

## 2020-10-28 ENCOUNTER — OFFICE VISIT (OUTPATIENT)
Dept: SURGERY | Age: 45
End: 2020-10-28

## 2020-10-28 VITALS
OXYGEN SATURATION: 98 % | SYSTOLIC BLOOD PRESSURE: 128 MMHG | HEIGHT: 61 IN | RESPIRATION RATE: 18 BRPM | BODY MASS INDEX: 38.55 KG/M2 | WEIGHT: 204.2 LBS | HEART RATE: 82 BPM | DIASTOLIC BLOOD PRESSURE: 84 MMHG | TEMPERATURE: 98.7 F

## 2020-10-28 DIAGNOSIS — E66.9 OBESITY, UNSPECIFIED CLASSIFICATION, UNSPECIFIED OBESITY TYPE, UNSPECIFIED WHETHER SERIOUS COMORBIDITY PRESENT: Primary | ICD-10-CM

## 2020-10-28 NOTE — PROGRESS NOTES
Delphine Mendiola is a 50 y.o. female  Chief Complaint   Patient presents with    Weight Management     weekly weigh in     Patient attended a weekly class as part of the Medically Supervised Weight Loss Program.  This class was facilitated by a Registered Dietitian. Topics taught at class focused on diet, particularly carbohydrate counting and portion control. Classes also focused on behavior changes and the importance of establishing a daily exercise routine. Progress Note: Weekly Medical Monitoring in the [facility] Program    Is there anything that you or the patient needs to let the supervising provider know about? no    Over the past week, have you experienced any side-effects? no    Delphine Mendiola is a 50 y.o. female who is enrolled in [facility]    Delphine Mendiola was prescribed the VLCD / LCD. Visit Vitals  /84   Pulse 82   Temp 98.7 °F (37.1 °C) (Temporal)   Resp 18   Ht 5' 1\" (1.549 m)   Wt 204 lb 3.2 oz (92.6 kg)   SpO2 98%   BMI 38.58 kg/m²     Weight Metrics 10/28/2020 10/28/2020 10/21/2020 10/16/2020 10/16/2020 10/7/2020 10/7/2020   Weight - 204 lb 3.2 oz 207 lb - 204 lb 12.8 oz - 205 lb 11.2 oz   Waist Measure Inches 37.5 - 46 36.5 - 37.5 -   Exercise Mins/week - - - - - - -   Body Fat % - - - - - - -   BMI - 38.58 kg/m2 39.11 kg/m2 - 40 kg/m2 - 38.87 kg/m2         Have you received any other medical care this week? no  If yes, where and for what? Have you had any change in your medications since your last visit? no  If yes what? Did you have any problems adhering to the program last week? no  If yes, please explain:       Eating Habits Over Last Week:  Did you take in 64 oz of non-caloric fluids? yes     Did you consume your prescribed meal replacement regimen each day?  yes       Physical Activity Over the Past Week:    Aerobic exercise: 210 min  Resistance exercise: n0 workouts / week

## 2020-11-04 ENCOUNTER — OFFICE VISIT (OUTPATIENT)
Dept: SURGERY | Age: 45
End: 2020-11-04

## 2020-11-04 VITALS
RESPIRATION RATE: 18 BRPM | SYSTOLIC BLOOD PRESSURE: 104 MMHG | DIASTOLIC BLOOD PRESSURE: 77 MMHG | BODY MASS INDEX: 38.78 KG/M2 | HEART RATE: 80 BPM | OXYGEN SATURATION: 98 % | WEIGHT: 205.4 LBS | HEIGHT: 61 IN

## 2020-11-04 DIAGNOSIS — E66.9 OBESITY, UNSPECIFIED CLASSIFICATION, UNSPECIFIED OBESITY TYPE, UNSPECIFIED WHETHER SERIOUS COMORBIDITY PRESENT: Primary | ICD-10-CM

## 2020-11-04 NOTE — PROGRESS NOTES
Sharita Sanchez is a 50 y.o. female  Chief Complaint   Patient presents with    Weight Management     weekly weigh in     Patient attended a weekly class as part of the Medically Supervised Weight Loss Program.  This class was facilitated by a Registered Dietitian. Topics taught at class focused on diet, particularly carbohydrate counting and portion control. Classes also focused on behavior changes and the importance of establishing a daily exercise routine. Progress Note: Weekly Medical Monitoring in the [facility]    Is there anything that you or the patient needs to let the supervising provider know about? no    Over the past week, have you experienced any side-effects? no    Sharita Sanchez is a 50 y.o. female who is enrolled in [facility]    Sharita Sanchez was prescribed the VLCD / LCD. Visit Vitals  /77   Pulse 80   Resp 18   Ht 5' 1\" (1.549 m)   Wt 205 lb 6.4 oz (93.2 kg)   SpO2 98%   BMI 38.81 kg/m²     Weight Metrics 11/4/2020 11/4/2020 10/28/2020 10/28/2020 10/21/2020 10/16/2020 10/16/2020   Weight - 205 lb 6.4 oz - 204 lb 3.2 oz 207 lb - 204 lb 12.8 oz   Waist Measure Inches 35.75 - 37.5 - 46 36.5 -   Exercise Mins/week - - - - - - -   Body Fat % - - - - - - -   BMI - 38.81 kg/m2 - 38.58 kg/m2 39.11 kg/m2 - 40 kg/m2         Have you received any other medical care this week? no  If yes, where and for what? Have you had any change in your medications since your last visit? no  If yes what? Did you have any problems adhering to the program last week? no  If yes, please explain:       Eating Habits Over Last Week:  Did you take in 64 oz of non-caloric fluids? yes     Did you consume your prescribed meal replacement regimen each day?  yes       Physical Activity Over the Past Week:    Aerobic exercise: 210 min  Resistance exercise: no workouts / week Self

## 2020-11-11 ENCOUNTER — OFFICE VISIT (OUTPATIENT)
Dept: SURGERY | Age: 45
End: 2020-11-11

## 2020-11-11 VITALS
RESPIRATION RATE: 18 BRPM | HEART RATE: 81 BPM | DIASTOLIC BLOOD PRESSURE: 82 MMHG | BODY MASS INDEX: 38.95 KG/M2 | HEIGHT: 61 IN | OXYGEN SATURATION: 100 % | SYSTOLIC BLOOD PRESSURE: 123 MMHG | WEIGHT: 206.3 LBS

## 2020-11-11 DIAGNOSIS — E66.9 OBESITY, UNSPECIFIED CLASSIFICATION, UNSPECIFIED OBESITY TYPE, UNSPECIFIED WHETHER SERIOUS COMORBIDITY PRESENT: Primary | ICD-10-CM

## 2020-11-11 NOTE — PROGRESS NOTES
Patient attended a weekly class as part of the Medically Supervised Weight Loss Program.  This class was facilitated by a Registered Dietitian. Topics taught at class focused on diet, particularly carbohydrate counting and portion control. Classes also focused on behavior changes and the importance of establishing a daily exercise routine. Progress Note: Weekly Medical Monitoring in the [facility]    Is there anything that you or the patient needs to let the supervising provider know about? no    Over the past week, have you experienced any side-effects? no    Neil Dill is a 50 y.o. female who is enrolled in [facility]    Neil Dill was prescribed the VLCD / LCD. Visit Vitals  /82   Pulse 81   Resp 18   Ht 5' 1\" (1.549 m)   Wt 93.6 kg (206 lb 4.8 oz)   SpO2 100%   BMI 38.98 kg/m²     Weight Metrics 11/11/2020 11/4/2020 11/4/2020 10/28/2020 10/28/2020 10/21/2020 10/16/2020   Weight 206 lb 4.8 oz - 205 lb 6.4 oz - 204 lb 3.2 oz 207 lb -   Waist Measure Inches 35 35.75 - 37.5 - 46 36.5   Exercise Mins/week - - - - - - -   Body Fat % - - - - - - -   BMI 38.98 kg/m2 - 38.81 kg/m2 - 38.58 kg/m2 39.11 kg/m2 -         Have you received any other medical care this week? yes  If yes, where and for what? Hematologist.    Have you had any change in your medications since your last visit? yes  If yes what? Stopped her Risperdal per her PCP    Did you have any problems adhering to the program last week? yes  If yes, please explain: some anxiety. Eating Habits Over Last Week:  Did you take in 64 oz of non-caloric fluids? yes     Did you consume your prescribed meal replacement regimen each day?  yes       Physical Activity Over the Past Week:    Aerobic exercise: 3 hours 50 min  Resistance exercise: 0 workouts / week

## 2020-12-30 ENCOUNTER — OFFICE VISIT (OUTPATIENT)
Dept: SURGERY | Age: 45
End: 2020-12-30
Payer: MEDICAID

## 2020-12-30 VITALS
BODY MASS INDEX: 42.6 KG/M2 | HEART RATE: 84 BPM | TEMPERATURE: 97.8 F | HEIGHT: 60 IN | WEIGHT: 217 LBS | SYSTOLIC BLOOD PRESSURE: 132 MMHG | OXYGEN SATURATION: 99 % | RESPIRATION RATE: 18 BRPM | DIASTOLIC BLOOD PRESSURE: 86 MMHG

## 2020-12-30 DIAGNOSIS — R10.9 RIGHT LATERAL ABDOMINAL PAIN: Primary | ICD-10-CM

## 2020-12-30 PROCEDURE — 99203 OFFICE O/P NEW LOW 30 MIN: CPT | Performed by: SURGERY

## 2020-12-30 RX ORDER — LURASIDONE HYDROCHLORIDE 20 MG/1
TABLET, FILM COATED ORAL
COMMUNITY
Start: 2020-12-07 | End: 2022-03-22

## 2020-12-30 RX ORDER — HYDROXYZINE PAMOATE 25 MG/1
25 CAPSULE ORAL
COMMUNITY

## 2020-12-30 NOTE — PROGRESS NOTES
HPI     Patient is a 47-year-old female coming in today with right lateral abdominal wall tenderness. Patient had previous surgical history including a left nephrectomy, hysterectomy and right oophorectomy. Patient says that she had a rupture of ovarian cysts in 2015 for which she underwent a right oophorectomy. Since then she has been experiencing pain and discomfort in the right lateral abdominal wall. Patient says that this is similar pain compared to when she presented with a ruptured ovarian cyst.  Patient is overly concerned that this pain and discomfort might be something serious requiring surgery again. Patient says that the pain is better when she has a bowel movement but otherwise consistent. Patient says that she is eating well and has no changes in her dietary habits. Past Medical History:   Diagnosis Date    Anxiety     Cancer ([facility] [address]     kidney    Chronic kidney disease     Stage II    Depression     pt states anxiety schizoaffective ptsd    Eye twitch     Hypertension     Personal history of kidney cancer         Past Surgical History:   Procedure Laterality Date    HX COLONOSCOPY      HX HYSTERECTOMY      HX NEPHRECTOMY Left     HX OOPHORECTOMY Right     HX OTHER SURGICAL      left nephrectomy        Current Outpatient Medications   Medication Sig Dispense Refill    Latuda 20 mg tab tablet TAKE 1 TABLET BY MOUTH EVERY DAY WITH FOOD      hydrOXYzine pamoate (VistariL) 25 mg capsule Take 25 mg by mouth three (3) times daily as needed.  traZODone (DESYREL) 50 mg tablet Take 75 mg by mouth nightly.       lidocaine (LIDODERM) 5 % APPLY 1 PATCHES DAILY TO AFFECTED AREAS AS NEEDED FOR PAIN. 12 HOURS ON 12 HOURS OFF      sertraline (ZOLOFT) 50 mg tablet TAKE 1/2 TAB BY MOUTH DAILY FOR 4 DAYS THEN 1 TAB BY MOUTH DAILY FOR MOOD      irbesartan (AVAPRO) 300 mg tablet TAKE 1 TABLET BY MOUTH EVERY DAY FOR BLOOD PRESSURE      ciclopirox (LOPROX) 0.77 % topical cream APPLY TO AFFECTED AREA TWICE A DAY      tiZANidine (ZANAFLEX) 4 mg tablet TAKE 1 TABLET (4 MG) BY MOUTH EVERY 6 HOURS AS NEEDED NOT TO EXCEED 3 DOSES IN 24 HOURS      ergocalciferol (ERGOCALCIFEROL) 1,250 mcg (50,000 unit) capsule TAKE 1 CAP BY MOUTH TWICE WEEKLY      Amitiza 24 mcg capsule TAKE 1 CAPSULE BY MOUTH TWICE A DAY      magnesium hydroxide (MILK OF MAGNESIA) 2,400 mg/10 mL susp suspension Take 15 mL by mouth.  sertraline (ZOLOFT) 25 mg tablet Take  by mouth daily.  calcium-cholecalciferol, d3, (CALCIUM 600 + D) 600-125 mg-unit tab Take  by mouth.  albuterol (PROVENTIL HFA, VENTOLIN HFA, PROAIR HFA) 90 mcg/actuation inhaler 1-2 Puffs.  benzonatate (TESSALON) 100 mg capsule Take 1 capsule 3 times daily as needed for coughing, swallow capsules whole.  cetirizine (ZYRTEC) 10 mg tablet 10 mg.      ascorbic acid, vitamin C, (VITAMIN C) 500 mg tablet Take 500 mg by mouth daily.  multivitamin (ONE A DAY) tablet Take 1 Tab by mouth daily.  pyridoxine HCl, vitamin B6, (VITAMIN B-6 PO) Take  by mouth daily.  fish oil-omega-3 fatty acids 340-1,000 mg capsule Take 1 Cap by mouth daily.  FIBER, PSYLLIUM HUSK, PO Take  by mouth daily.  Ascorbic Acid-Multivits-Min (EMERGEN-C) 1,000 mg pwep Take  by mouth daily.  magnesium 250 mg tab Take  by mouth daily.  zinc 50 mg tab tablet Take 50 mg by mouth daily.  lactobacillus rhamnosus gg 15 billion cell (CULTURELLE) 15 billion cell capsule Take 1 Cap by mouth daily.  butalbital-acetaminophen-caff (FIORICET) -40 mg per capsule Take 1-2 Caps by mouth every six (6) hours as needed for Pain. Max Daily Amount: 8 Caps. 20 Cap 0    simvastatin (ZOCOR) 10 mg tablet Take 20 mg by mouth nightly.  hydrochlorothiazide (HYDRODIURIL) 25 mg tablet Take 25 mg by mouth daily.       irbesartan-hydroCHLOROthiazide (AVALIDE) 300-12.5 mg per tablet       risperiDONE (RisperDAL) 1 mg tablet       ARIPiprazole (ABILIFY) 2 mg tablet TAKE 1/2 TAB BY MOUTH EVERY NIGHT FOR MOOD      docusate sodium (STOOL SOFTENER) 100 mg capsule Take 100 mg by mouth two (2) times a day.  losartan (COZAAR) 100 mg tablet Take 100 mg by mouth daily. Allergies   Allergen Reactions    Seafood Hives and Swelling    Iodine Other (comments)    Nsaids (Non-Steroidal Anti-Inflammatory Drug) Other (comments)    Shellfish Derived Angioedema        Social History     Socioeconomic History    Marital status: SINGLE     Spouse name: Not on file    Number of children: Not on file    Years of education: Not on file    Highest education level: Not on file   Occupational History    Not on file   Social Needs    Financial resource strain: Not on file    Food insecurity     Worry: Not on file     Inability: Not on file    Transportation needs     Medical: Not on file     Non-medical: Not on file   Tobacco Use    Smoking status: Former Smoker     Quit date: 2011     Years since quittin.0    Smokeless tobacco: Never Used   Substance and Sexual Activity    Alcohol use: Yes     Frequency: Monthly or less    Drug use: No    Sexual activity: Not on file   Lifestyle    Physical activity     Days per week: Not on file     Minutes per session: Not on file    Stress: Not on file   Relationships    Social connections     Talks on phone: Not on file     Gets together: Not on file     Attends Shinto service: Not on file     Active member of club or organization: Not on file     Attends meetings of clubs or organizations: Not on file     Relationship status: Not on file    Intimate partner violence     Fear of current or ex partner: Not on file     Emotionally abused: Not on file     Physically abused: Not on file     Forced sexual activity: Not on file   Other Topics Concern    Not on file   Social History Narrative    ** Merged History Encounter **             No family history on file.         Visit Vitals  /86   Pulse 84   Temp 97.8 °F (36.6 °C)   Resp 18   Ht 5' (1.524 m)   Wt 98.4 kg (217 lb)   SpO2 99%   BMI 42.38 kg/m²        Review of Systems   Constitutional: Negative. HENT: Negative. Respiratory: Negative. Cardiovascular: Negative. Genitourinary: Negative. Musculoskeletal:        Right lateral abdominal wall tenderness   Skin: Negative. Neurological: Negative. Psychiatric/Behavioral: Negative. Physical Exam  Constitutional:       Appearance: She is obese. HENT:      Head: Normocephalic and atraumatic. Nose: Nose normal.      Mouth/Throat:      Mouth: Mucous membranes are moist.      Pharynx: Oropharynx is clear. Eyes:      Extraocular Movements: Extraocular movements intact. Conjunctiva/sclera: Conjunctivae normal.      Pupils: Pupils are equal, round, and reactive to light. Neck:      Musculoskeletal: Normal range of motion and neck supple. Cardiovascular:      Rate and Rhythm: Normal rate and regular rhythm. Pulses: Normal pulses. Heart sounds: Normal heart sounds. Pulmonary:      Effort: Pulmonary effort is normal.      Breath sounds: Normal breath sounds. Abdominal:      General: Abdomen is flat. Bowel sounds are normal.      Palpations: Abdomen is soft. Comments: Patient has pain along the right lateral abdominal wall below her right lateral ribs. There is no palpable hernia   Musculoskeletal: Normal range of motion. Skin:     General: Skin is warm and dry. Neurological:      General: No focal deficit present. Mental Status: She is alert. Mental status is at baseline. Psychiatric:         Mood and Affect: Mood normal.         Thought Content: Thought content normal.         Judgment: Judgment normal.          Diagnoses and all orders for this visit:    1. Right lateral abdominal pain       Patient is a 53-year-old female presenting with pain along her right lateral abdominal wall.   Patient says that this pain has been there since 2015 after her right oophorectomy. Patient says that this pain is similar to the time when she presented for a ruptured ovarian cyst.  Patient has a CAT scan done at outside hospital in June 2020. There was no abnormalities noted per CAT scan report. On physical exam patient has tenderness below the right lateral rib. Her abdomen is otherwise soft and benign. I have asked the patient to bring her CT scan images to the office so that I can personally review them. I have also ordered a ultrasound to look at that specific area along the right lateral wall over the area of pain to rule out any small hernias or possible soft tissue mass. No visits with results within 3 Month(s) from this visit. Latest known visit with results is:   Hospital Outpatient Visit on 09/18/2020   Component Date Value Ref Range Status    [facility] SPECIMEN COL 09/18/2020 Specimens collected/sent to [facility]    Final         Ms. Adan King has a reminder for a \"due or due soon\" health maintenance. I have asked that she contact her primary care provider for follow-up on this health maintenance.        Capo Trujillo MD

## 2021-01-06 VITALS — WEIGHT: 212 LBS | BODY MASS INDEX: 41.4 KG/M2

## 2021-01-13 ENCOUNTER — OFFICE VISIT (OUTPATIENT)
Dept: SURGERY | Age: 46
End: 2021-01-13

## 2021-01-13 VITALS
WEIGHT: 209.8 LBS | HEIGHT: 61 IN | BODY MASS INDEX: 39.61 KG/M2 | HEART RATE: 96 BPM | DIASTOLIC BLOOD PRESSURE: 90 MMHG | SYSTOLIC BLOOD PRESSURE: 132 MMHG

## 2021-01-13 DIAGNOSIS — R10.11 RIGHT UPPER QUADRANT ABDOMINAL PAIN: Primary | ICD-10-CM

## 2021-01-13 DIAGNOSIS — E66.9 OBESITY, UNSPECIFIED CLASSIFICATION, UNSPECIFIED OBESITY TYPE, UNSPECIFIED WHETHER SERIOUS COMORBIDITY PRESENT: Primary | ICD-10-CM

## 2021-01-13 DIAGNOSIS — R10.10 PAIN OF UPPER ABDOMEN: ICD-10-CM

## 2021-01-13 NOTE — PROGRESS NOTES
Chief Complaint   Patient presents with    Weight Management     Patient attended a weekly class as part of the Medically Supervised Weight Loss Program.  This class was facilitated by a Registered Dietitian. Topics taught at class focused on diet, particularly carbohydrate counting and portion control. Classes also focused on behavior changes and the importance of establishing a daily exercise routine. Progress Note: Weekly Medical Monitoring in the [facility]    Is there anything that you or the patient needs to let the supervising provider know about? no    Over the past week, have you experienced any side-effects? no    Maria C Ricks is a 50 y.o. female who is enrolled in [facility]    Maria C Ricks was prescribed the VLCD / LCD. Visit Vitals  Ht 5' 1\" (1.549 m)   Wt 95.2 kg (209 lb 12.8 oz)   BMI 39.64 kg/m²     Weight Metrics 1/13/2021 1/6/2021 12/30/2020 11/11/2020 11/4/2020 11/4/2020 10/28/2020   Weight 209 lb 12.8 oz 212 lb 217 lb 206 lb 4.8 oz - 205 lb 6.4 oz -   Waist Measure Inches 37 - - 35 35.75 - 37.5   Exercise Mins/week - - - - - - -   Body Fat % - - - - - - -   BMI 39.64 kg/m2 41.4 kg/m2 42.38 kg/m2 38.98 kg/m2 - 38.81 kg/m2 -         Have you received any other medical care this week? no  If yes, where and for what? Have you had any change in your medications since your last visit? no  If yes what? Did you have any problems adhering to the program last week? no  If yes, please explain:       Eating Habits Over Last Week:  Did you take in 64 oz of non-caloric fluids? yes     Did you consume your prescribed meal replacement regimen each day?  yes       Physical Activity Over the Past Week:    Aerobic exercise: 210 min  Resistance exercise: 0 workouts / week

## 2021-01-14 DIAGNOSIS — R10.11 RIGHT UPPER QUADRANT ABDOMINAL PAIN: Primary | ICD-10-CM

## 2021-01-18 ENCOUNTER — OFFICE VISIT (OUTPATIENT)
Dept: SURGERY | Age: 46
End: 2021-01-18
Payer: MEDICAID

## 2021-01-18 VITALS
DIASTOLIC BLOOD PRESSURE: 94 MMHG | TEMPERATURE: 97.3 F | RESPIRATION RATE: 18 BRPM | SYSTOLIC BLOOD PRESSURE: 124 MMHG | WEIGHT: 208 LBS | BODY MASS INDEX: 39.27 KG/M2 | HEIGHT: 61 IN | HEART RATE: 80 BPM

## 2021-01-18 DIAGNOSIS — E66.9 OBESITY (BMI 35.0-39.9 WITHOUT COMORBIDITY): Primary | ICD-10-CM

## 2021-01-18 DIAGNOSIS — Z71.3 WEIGHT LOSS COUNSELING, ENCOUNTER FOR: ICD-10-CM

## 2021-01-18 DIAGNOSIS — Z78.9 WEIGHT LOSS ADVISED: ICD-10-CM

## 2021-01-18 DIAGNOSIS — E66.9 OBESITY, UNSPECIFIED CLASSIFICATION, UNSPECIFIED OBESITY TYPE, UNSPECIFIED WHETHER SERIOUS COMORBIDITY PRESENT: ICD-10-CM

## 2021-01-18 PROCEDURE — 99213 OFFICE O/P EST LOW 20 MIN: CPT | Performed by: NURSE PRACTITIONER

## 2021-01-18 NOTE — PROGRESS NOTES
[facility] Progress Note:   F/up Provider Visit    CC: Weight Management    Goldie Joe is a 50 y.o. female who is here for her  f/up medical provider visit for the LCD Program. She was last seen 10/16/20. She presents today wishing to continue with the program.        Weight History  Weight Metrics 1/18/2021 1/13/2021 1/13/2021 1/6/2021 12/30/2020 11/11/2020 11/4/2020   Weight 208 lb - 209 lb 12.8 oz 212 lb 217 lb 206 lb 4.8 oz -   Waist Measure Inches - 37 - - - 35 35.75   Exercise Mins/week - - - - - - -   Body Fat % - - - - - - -   BMI 39.3 kg/m2 - 39.64 kg/m2 41.4 kg/m2 42.38 kg/m2 38.98 kg/m2 -       Starting wt: 214  Goal wt: 158  EKG due: 164  Ideal body weight: 47.8 kg (105 lb 6.1 oz)  Adjusted ideal body weight: 66.4 kg (146 lb 6.8 oz)  Body mass index is 39.3 kg/m². History    Past Medical History:   Diagnosis Date    Anxiety     Cancer ([facility] [address]     kidney    Chronic kidney disease     Stage II    Depression     pt states anxiety schizoaffective ptsd    Eye twitch     Hypertension     Personal history of kidney cancer     Psychotic disorder ([facility]     Schizoaffective disorder       Past Surgical History:   Procedure Laterality Date    HX COLONOSCOPY      HX HYSTERECTOMY      HX NEPHRECTOMY Left     HX OOPHORECTOMY Right     HX OTHER SURGICAL      left nephrectomy       Current Outpatient Medications   Medication Sig Dispense Refill    Latuda 20 mg tab tablet TAKE 1 TABLET BY MOUTH EVERY DAY WITH FOOD      hydrOXYzine pamoate (VistariL) 25 mg capsule Take 25 mg by mouth three (3) times daily as needed.  irbesartan-hydroCHLOROthiazide (AVALIDE) 300-12.5 mg per tablet       traZODone (DESYREL) 50 mg tablet Take 75 mg by mouth nightly.       lidocaine (LIDODERM) 5 % APPLY 1 PATCHES DAILY TO AFFECTED AREAS AS NEEDED FOR PAIN. 12 HOURS ON 12 HOURS OFF      sertraline (ZOLOFT) 50 mg tablet TAKE 1/2 TAB BY MOUTH DAILY FOR 4 DAYS THEN 1 TAB BY MOUTH DAILY FOR MOOD  irbesartan (AVAPRO) 300 mg tablet TAKE 1 TABLET BY MOUTH EVERY DAY FOR BLOOD PRESSURE      ciclopirox (LOPROX) 0.77 % topical cream APPLY TO AFFECTED AREA TWICE A DAY      ergocalciferol (ERGOCALCIFEROL) 1,250 mcg (50,000 unit) capsule TAKE 1 CAP BY MOUTH TWICE WEEKLY      Amitiza 24 mcg capsule TAKE 1 CAPSULE BY MOUTH TWICE A DAY      magnesium hydroxide (MILK OF MAGNESIA) 2,400 mg/10 mL susp suspension Take 15 mL by mouth.  calcium-cholecalciferol, d3, (CALCIUM 600 + D) 600-125 mg-unit tab Take  by mouth.  benzonatate (TESSALON) 100 mg capsule Take 1 capsule 3 times daily as needed for coughing, swallow capsules whole.  cetirizine (ZYRTEC) 10 mg tablet 10 mg.      ascorbic acid, vitamin C, (VITAMIN C) 500 mg tablet Take 500 mg by mouth daily.  multivitamin (ONE A DAY) tablet Take 1 Tab by mouth daily.  pyridoxine HCl, vitamin B6, (VITAMIN B-6 PO) Take  by mouth daily.  fish oil-omega-3 fatty acids 340-1,000 mg capsule Take 1 Cap by mouth daily.  FIBER, PSYLLIUM HUSK, PO Take  by mouth daily.  Ascorbic Acid-Multivits-Min (EMERGEN-C) 1,000 mg pwep Take  by mouth daily.  magnesium 250 mg tab Take  by mouth daily.  zinc 50 mg tab tablet Take 50 mg by mouth daily.  butalbital-acetaminophen-caff (FIORICET) -40 mg per capsule Take 1-2 Caps by mouth every six (6) hours as needed for Pain. Max Daily Amount: 8 Caps. 20 Cap 0    simvastatin (ZOCOR) 10 mg tablet Take 20 mg by mouth nightly.  hydrochlorothiazide (HYDRODIURIL) 25 mg tablet Take 25 mg by mouth daily.  risperiDONE (RisperDAL) 1 mg tablet       ARIPiprazole (ABILIFY) 2 mg tablet TAKE 1/2 TAB BY MOUTH EVERY NIGHT FOR MOOD      tiZANidine (ZANAFLEX) 4 mg tablet TAKE 1 TABLET (4 MG) BY MOUTH EVERY 6 HOURS AS NEEDED NOT TO EXCEED 3 DOSES IN 24 HOURS      sertraline (ZOLOFT) 25 mg tablet Take  by mouth daily.       docusate sodium (STOOL SOFTENER) 100 mg capsule Take 100 mg by mouth two (2) times a day.  albuterol (PROVENTIL HFA, VENTOLIN HFA, PROAIR HFA) 90 mcg/actuation inhaler 1-2 Puffs.  lactobacillus rhamnosus gg 15 billion cell (CULTURELLE) 15 billion cell capsule Take 1 Cap by mouth daily.  losartan (COZAAR) 100 mg tablet Take 100 mg by mouth daily. Allergies   Allergen Reactions    Seafood Hives and Swelling    Iodine Other (comments)    Nsaids (Non-Steroidal Anti-Inflammatory Drug) Other (comments)    Shellfish Derived Angioedema       Social History     Tobacco Use    Smoking status: Former Smoker     Quit date: 2011     Years since quittin.1    Smokeless tobacco: Never Used   Substance Use Topics    Alcohol use: Yes     Frequency: Monthly or less    Drug use: No       No family history on file. No family status information on file. Medications:  Outpatient Medications Marked as Taking for the 21 encounter (Office Visit) with Loretta Zabala NP   Medication Sig Dispense Refill    Latuda 20 mg tab tablet TAKE 1 TABLET BY MOUTH EVERY DAY WITH FOOD      hydrOXYzine pamoate (VistariL) 25 mg capsule Take 25 mg by mouth three (3) times daily as needed.  irbesartan-hydroCHLOROthiazide (AVALIDE) 300-12.5 mg per tablet       traZODone (DESYREL) 50 mg tablet Take 75 mg by mouth nightly.       lidocaine (LIDODERM) 5 % APPLY 1 PATCHES DAILY TO AFFECTED AREAS AS NEEDED FOR PAIN. 12 HOURS ON 12 HOURS OFF      sertraline (ZOLOFT) 50 mg tablet TAKE 1/2 TAB BY MOUTH DAILY FOR 4 DAYS THEN 1 TAB BY MOUTH DAILY FOR MOOD      irbesartan (AVAPRO) 300 mg tablet TAKE 1 TABLET BY MOUTH EVERY DAY FOR BLOOD PRESSURE      ciclopirox (LOPROX) 0.77 % topical cream APPLY TO AFFECTED AREA TWICE A DAY      tiZANidine (ZANAFLEX) 4 mg tablet TAKE 1 TABLET (4 MG) BY MOUTH EVERY 6 HOURS AS NEEDED NOT TO EXCEED 3 DOSES IN 24 HOURS      ergocalciferol (ERGOCALCIFEROL) 1,250 mcg (50,000 unit) capsule TAKE 1 CAP BY MOUTH TWICE WEEKLY      Amitiza 24 mcg capsule TAKE 1 CAPSULE BY MOUTH TWICE A DAY      magnesium hydroxide (MILK OF MAGNESIA) 2,400 mg/10 mL susp suspension Take 15 mL by mouth.  calcium-cholecalciferol, d3, (CALCIUM 600 + D) 600-125 mg-unit tab Take  by mouth.  benzonatate (TESSALON) 100 mg capsule Take 1 capsule 3 times daily as needed for coughing, swallow capsules whole.  cetirizine (ZYRTEC) 10 mg tablet 10 mg.      ascorbic acid, vitamin C, (VITAMIN C) 500 mg tablet Take 500 mg by mouth daily.  multivitamin (ONE A DAY) tablet Take 1 Tab by mouth daily.  pyridoxine HCl, vitamin B6, (VITAMIN B-6 PO) Take  by mouth daily.  fish oil-omega-3 fatty acids 340-1,000 mg capsule Take 1 Cap by mouth daily.  FIBER, PSYLLIUM HUSK, PO Take  by mouth daily.  Ascorbic Acid-Multivits-Min (EMERGEN-C) 1,000 mg pwep Take  by mouth daily.  magnesium 250 mg tab Take  by mouth daily.  zinc 50 mg tab tablet Take 50 mg by mouth daily.  butalbital-acetaminophen-caff (FIORICET) -40 mg per capsule Take 1-2 Caps by mouth every six (6) hours as needed for Pain. Max Daily Amount: 8 Caps. 20 Cap 0    simvastatin (ZOCOR) 10 mg tablet Take 20 mg by mouth nightly.  hydrochlorothiazide (HYDRODIURIL) 25 mg tablet Take 25 mg by mouth daily. Review of Systems  Review of Systems   Constitutional: Positive for malaise/fatigue. Weight gain    All other systems reviewed and are negative. Objective  Visit Vitals  BP (!) 124/94   Pulse 80   Temp 97.3 °F (36.3 °C)   Resp 18   Ht 5' 1\" (1.549 m)   Wt 94.3 kg (208 lb)   BMI 39.30 kg/m²     No LMP recorded. Patient has had a hysterectomy. Physical Exam  Physical Exam  Vitals signs and nursing note reviewed. Constitutional:       Appearance: She is well-developed. She is obese. HENT:      Head: Normocephalic and atraumatic. Eyes:      Pupils: Pupils are equal, round, and reactive to light. Cardiovascular:      Rate and Rhythm: Normal rate. Pulmonary:      Effort: Pulmonary effort is normal.   Musculoskeletal: Normal range of motion. Skin:     General: Skin is warm and dry. Neurological:      General: No focal deficit present. Mental Status: She is alert and oriented to person, place, and time. Psychiatric:         Mood and Affect: Mood normal.         Behavior: Behavior normal.           No results found for this or any previous visit (from the past 672 hour(s)). Assessment / Plan    Encounter Diagnoses   Name Primary?  Obesity (BMI 35.0-39.9 without comorbidity) Yes    Obesity, unspecified classification, unspecified obesity type, unspecified whether serious comorbidity present     BMI 39.0-39.9,adult     Weight loss advised     Weight loss counseling, encounter for            1.  Weight management      Today, the patient is  Height: 5' 1\" (154.9 cm) tall, Weight: 94.3 kg (208 lb) lbs for a Body mass index is 39.3 kg/m². is suffering from obesity  which is further complicated by hyperlipidemia. Degree of control: not attending visits regularly but now is on board to follow program requirments   Progress was reviewed with patient. Goal(s) for next appointment:   -10lb    I have reviewed/discussed the above normal BMI with the patient. I have recommended the following interventions: dietary management education, guidance, and counseling, encourage exercise, monitor weight and prescribed dietary intake . Marcos Spangler 2.  Labs    Latest results reviewed with patient   Routine monitoring labs ordered  Orders Placed This Encounter    METABOLIC PANEL, MICHELLE Canales, FNP-BC     Dragon medical dictation software was used for portions of this report. Unintended transcription errors may occur.

## 2021-01-27 ENCOUNTER — OFFICE VISIT (OUTPATIENT)
Dept: SURGERY | Age: 46
End: 2021-01-27

## 2021-01-27 VITALS
SYSTOLIC BLOOD PRESSURE: 110 MMHG | HEART RATE: 90 BPM | BODY MASS INDEX: 38.55 KG/M2 | WEIGHT: 204.2 LBS | DIASTOLIC BLOOD PRESSURE: 80 MMHG | HEIGHT: 61 IN

## 2021-01-27 DIAGNOSIS — E66.9 OBESITY, UNSPECIFIED CLASSIFICATION, UNSPECIFIED OBESITY TYPE, UNSPECIFIED WHETHER SERIOUS COMORBIDITY PRESENT: Primary | ICD-10-CM

## 2021-01-27 NOTE — PROGRESS NOTES
Chief Complaint   Patient presents with    Weight Management     Patient attended a weekly class as part of the Medically Supervised Weight Loss Program.  This class was facilitated by a Registered Dietitian. Topics taught at class focused on diet, particularly carbohydrate counting and portion control. Classes also focused on behavior changes and the importance of establishing a daily exercise routine. Progress Note: Weekly Medical Monitoring in the [facility] Loss Program    Is there anything that you or the patient needs to let the supervising provider know about? no    Over the past week, have you experienced any side-effects? no    Megan Mustafa is a 50 y.o. female who is enrolled in [facility]    Megan Mustafa was prescribed the VLCD / LCD. Visit Vitals  Ht 5' 1\" (1.549 m)   Wt 92.6 kg (204 lb 3.2 oz)   BMI 38.58 kg/m²     Weight Metrics 1/27/2021 1/18/2021 1/13/2021 1/13/2021 1/6/2021 12/30/2020 11/11/2020   Weight 204 lb 3.2 oz 208 lb - 209 lb 12.8 oz 212 lb 217 lb 206 lb 4.8 oz   Waist Measure Inches 37.25 - 37 - - - 35   Exercise Mins/week - - - - - - -   Body Fat % - - - - - - -   BMI 38.58 kg/m2 39.3 kg/m2 - 39.64 kg/m2 41.4 kg/m2 42.38 kg/m2 38.98 kg/m2         Have you received any other medical care this week? Yes If yes, where and for what? Ear nose throat  Have you had any change in your medications since your last visit? yes  If yes what? Ear drop added for ear infection  Did you have any problems adhering to the program last week? no  If yes, please explain:       Eating Habits Over Last Week:  Did you take in 64 oz of non-caloric fluids? yes     Did you consume your prescribed meal replacement regimen each day?  yes       Physical Activity Over the Past Week:    Aerobic exercise: 0 min  Resistance exercise: 0 workouts / week

## 2021-01-29 ENCOUNTER — HOSPITAL ENCOUNTER (OUTPATIENT)
Dept: ULTRASOUND IMAGING | Age: 46
Discharge: HOME | End: 2021-01-29
Attending: SURGERY
Payer: MEDICAID

## 2021-01-29 DIAGNOSIS — R10.11 RIGHT UPPER QUADRANT ABDOMINAL PAIN: ICD-10-CM

## 2021-01-29 PROCEDURE — 76705 ECHO EXAM OF ABDOMEN: CPT

## 2021-02-03 ENCOUNTER — OFFICE VISIT (OUTPATIENT)
Dept: SURGERY | Age: 46
End: 2021-02-03

## 2021-02-03 VITALS
RESPIRATION RATE: 18 BRPM | WEIGHT: 206.5 LBS | HEART RATE: 76 BPM | OXYGEN SATURATION: 98 % | DIASTOLIC BLOOD PRESSURE: 86 MMHG | TEMPERATURE: 97 F | BODY MASS INDEX: 38.99 KG/M2 | HEIGHT: 61 IN | SYSTOLIC BLOOD PRESSURE: 128 MMHG

## 2021-02-03 DIAGNOSIS — E66.9 OBESITY, UNSPECIFIED CLASSIFICATION, UNSPECIFIED OBESITY TYPE, UNSPECIFIED WHETHER SERIOUS COMORBIDITY PRESENT: Primary | ICD-10-CM

## 2021-02-19 ENCOUNTER — OFFICE VISIT (OUTPATIENT)
Dept: SURGERY | Age: 46
End: 2021-02-19
Payer: MEDICAID

## 2021-02-19 VITALS
RESPIRATION RATE: 18 BRPM | OXYGEN SATURATION: 98 % | SYSTOLIC BLOOD PRESSURE: 137 MMHG | DIASTOLIC BLOOD PRESSURE: 87 MMHG | WEIGHT: 210 LBS | TEMPERATURE: 97.2 F | HEIGHT: 61 IN | HEART RATE: 67 BPM | BODY MASS INDEX: 39.65 KG/M2

## 2021-02-19 DIAGNOSIS — E66.9 OBESITY (BMI 35.0-39.9 WITHOUT COMORBIDITY): Primary | ICD-10-CM

## 2021-02-19 DIAGNOSIS — E66.01 SEVERE OBESITY (HCC): ICD-10-CM

## 2021-02-19 PROCEDURE — 99213 OFFICE O/P EST LOW 20 MIN: CPT | Performed by: NURSE PRACTITIONER

## 2021-02-19 NOTE — Clinical Note
Please sent ref and note to     Annalisa Martínez, [address]    [facility] [place] [id number] [facility], [address] [place] [address], [address] [place] [phone]  Phone: [phone]  Fax: [phone]

## 2021-03-01 NOTE — PROGRESS NOTES
[facility] Progress Note:   F/up Provider Visit    CC: Weight Management    Aydee Kat is a 48 y.o. female who is here for her  f/up medical provider visit for the LCD Program.+4lbs is very frustrated with weight gain        Weight History  Weight Metrics 2/19/2021 2/3/2021 2/3/2021 1/27/2021 1/18/2021 1/13/2021 1/13/2021   Weight 210 lb - 206 lb 8 oz 204 lb 3.2 oz 208 lb - 209 lb 12.8 oz   Waist Measure Inches - 36.75 - 37.25 - 37 -   Exercise Mins/week - - - - - - -   Body Fat % - - - - - - -   BMI 39.68 kg/m2 - 39.02 kg/m2 38.58 kg/m2 39.3 kg/m2 - 39.64 kg/m2       Starting wt: 214  Goal wt: 158  EKG due: 164  Ideal body weight: 47.8 kg (105 lb 6.1 oz)  Adjusted ideal body weight: 66.8 kg (147 lb 3.6 oz)  Body mass index is 39.68 kg/m². History    Past Medical History:   Diagnosis Date    Anxiety     Cancer [facility]     kidney    Chronic kidney disease     Stage II    Depression     pt states anxiety schizoaffective ptsd    Eye twitch     Hypertension     Personal history of kidney cancer     Psychotic affective disorder ([facility] [address]     attention deficit disorder, and PTSD    [facility]     Schizoaffective disorder       Past Surgical History:   Procedure Laterality Date    HX COLONOSCOPY      HX HYSTERECTOMY      HX NEPHRECTOMY Left     HX OOPHORECTOMY Right     HX OTHER SURGICAL      left nephrectomy       Current Outpatient Medications   Medication Sig Dispense Refill    Latuda 20 mg tab tablet TAKE 1 TABLET BY MOUTH EVERY DAY WITH FOOD      hydrOXYzine pamoate (VistariL) 25 mg capsule Take 25 mg by mouth three (3) times daily as needed.  irbesartan-hydroCHLOROthiazide (AVALIDE) 300-12.5 mg per tablet       traZODone (DESYREL) 50 mg tablet Take 75 mg by mouth nightly.       lidocaine (LIDODERM) 5 % APPLY 1 PATCHES DAILY TO AFFECTED AREAS AS NEEDED FOR PAIN. 12 HOURS ON 12 HOURS OFF      sertraline (ZOLOFT) 50 mg tablet TAKE 1/2 TAB BY MOUTH DAILY FOR 4 DAYS THEN 1 TAB BY MOUTH DAILY FOR MOOD      irbesartan (AVAPRO) 300 mg tablet TAKE 1 TABLET BY MOUTH EVERY DAY FOR BLOOD PRESSURE      ciclopirox (LOPROX) 0.77 % topical cream APPLY TO AFFECTED AREA TWICE A DAY      tiZANidine (ZANAFLEX) 4 mg tablet TAKE 1 TABLET (4 MG) BY MOUTH EVERY 6 HOURS AS NEEDED NOT TO EXCEED 3 DOSES IN 24 HOURS      ergocalciferol (ERGOCALCIFEROL) 1,250 mcg (50,000 unit) capsule TAKE 1 CAP BY MOUTH TWICE WEEKLY      Amitiza 24 mcg capsule TAKE 1 CAPSULE BY MOUTH TWICE A DAY      magnesium hydroxide (MILK OF MAGNESIA) 2,400 mg/10 mL susp suspension Take 15 mL by mouth.  calcium-cholecalciferol, d3, (CALCIUM 600 + D) 600-125 mg-unit tab Take  by mouth.  albuterol (PROVENTIL HFA, VENTOLIN HFA, PROAIR HFA) 90 mcg/actuation inhaler 1-2 Puffs.  benzonatate (TESSALON) 100 mg capsule Take 1 capsule 3 times daily as needed for coughing, swallow capsules whole.  cetirizine (ZYRTEC) 10 mg tablet 10 mg.      ascorbic acid, vitamin C, (VITAMIN C) 500 mg tablet Take 500 mg by mouth daily.  multivitamin (ONE A DAY) tablet Take 1 Tab by mouth daily.  pyridoxine HCl, vitamin B6, (VITAMIN B-6 PO) Take  by mouth daily.  fish oil-omega-3 fatty acids 340-1,000 mg capsule Take 1 Cap by mouth daily.  FIBER, PSYLLIUM HUSK, PO Take  by mouth daily.  Ascorbic Acid-Multivits-Min (EMERGEN-C) 1,000 mg pwep Take  by mouth daily.  magnesium 250 mg tab Take  by mouth daily.  zinc 50 mg tab tablet Take 50 mg by mouth daily.  butalbital-acetaminophen-caff (FIORICET) -40 mg per capsule Take 1-2 Caps by mouth every six (6) hours as needed for Pain. Max Daily Amount: 8 Caps. 20 Cap 0    simvastatin (ZOCOR) 10 mg tablet Take 20 mg by mouth nightly.  hydrochlorothiazide (HYDRODIURIL) 25 mg tablet Take 25 mg by mouth daily.          Allergies   Allergen Reactions    Seafood Hives and Swelling    Iodine Other (comments)    Nsaids (Non-Steroidal Anti-Inflammatory Drug) Other (comments)    Shellfish Derived Angioedema       Social History     Tobacco Use    Smoking status: Former Smoker     Quit date: 2011     Years since quittin.2    Smokeless tobacco: Never Used   Substance Use Topics    Alcohol use: Yes     Frequency: Monthly or less    Drug use: No       No family history on file. No family status information on file. Medications:  Outpatient Medications Marked as Taking for the 21 encounter (Office Visit) with Pam Hightower NP   Medication Sig Dispense Refill    Latuda 20 mg tab tablet TAKE 1 TABLET BY MOUTH EVERY DAY WITH FOOD      hydrOXYzine pamoate (VistariL) 25 mg capsule Take 25 mg by mouth three (3) times daily as needed.  irbesartan-hydroCHLOROthiazide (AVALIDE) 300-12.5 mg per tablet       traZODone (DESYREL) 50 mg tablet Take 75 mg by mouth nightly.  lidocaine (LIDODERM) 5 % APPLY 1 PATCHES DAILY TO AFFECTED AREAS AS NEEDED FOR PAIN. 12 HOURS ON 12 HOURS OFF      sertraline (ZOLOFT) 50 mg tablet TAKE 1/2 TAB BY MOUTH DAILY FOR 4 DAYS THEN 1 TAB BY MOUTH DAILY FOR MOOD      irbesartan (AVAPRO) 300 mg tablet TAKE 1 TABLET BY MOUTH EVERY DAY FOR BLOOD PRESSURE      ciclopirox (LOPROX) 0.77 % topical cream APPLY TO AFFECTED AREA TWICE A DAY      tiZANidine (ZANAFLEX) 4 mg tablet TAKE 1 TABLET (4 MG) BY MOUTH EVERY 6 HOURS AS NEEDED NOT TO EXCEED 3 DOSES IN 24 HOURS      ergocalciferol (ERGOCALCIFEROL) 1,250 mcg (50,000 unit) capsule TAKE 1 CAP BY MOUTH TWICE WEEKLY      Amitiza 24 mcg capsule TAKE 1 CAPSULE BY MOUTH TWICE A DAY      magnesium hydroxide (MILK OF MAGNESIA) 2,400 mg/10 mL susp suspension Take 15 mL by mouth.  calcium-cholecalciferol, d3, (CALCIUM 600 + D) 600-125 mg-unit tab Take  by mouth.  albuterol (PROVENTIL HFA, VENTOLIN HFA, PROAIR HFA) 90 mcg/actuation inhaler 1-2 Puffs.       benzonatate (TESSALON) 100 mg capsule Take 1 capsule 3 times daily as needed for coughing, swallow capsules whole.  cetirizine (ZYRTEC) 10 mg tablet 10 mg.      ascorbic acid, vitamin C, (VITAMIN C) 500 mg tablet Take 500 mg by mouth daily.  multivitamin (ONE A DAY) tablet Take 1 Tab by mouth daily.  pyridoxine HCl, vitamin B6, (VITAMIN B-6 PO) Take  by mouth daily.  fish oil-omega-3 fatty acids 340-1,000 mg capsule Take 1 Cap by mouth daily.  FIBER, PSYLLIUM HUSK, PO Take  by mouth daily.  Ascorbic Acid-Multivits-Min (EMERGEN-C) 1,000 mg pwep Take  by mouth daily.  magnesium 250 mg tab Take  by mouth daily.  zinc 50 mg tab tablet Take 50 mg by mouth daily.  butalbital-acetaminophen-caff (FIORICET) -40 mg per capsule Take 1-2 Caps by mouth every six (6) hours as needed for Pain. Max Daily Amount: 8 Caps. 20 Cap 0    simvastatin (ZOCOR) 10 mg tablet Take 20 mg by mouth nightly.  hydrochlorothiazide (HYDRODIURIL) 25 mg tablet Take 25 mg by mouth daily. Review of Systems  Review of Systems   Constitutional: Positive for malaise/fatigue. Negative for weight loss. Weight gain    All other systems reviewed and are negative. Objective  Visit Vitals  /87   Pulse 67   Temp 97.2 °F (36.2 °C)   Resp 18   Ht 5' 1\" (1.549 m)   Wt 95.3 kg (210 lb)   SpO2 98%   BMI 39.68 kg/m²     No LMP recorded. Patient has had a hysterectomy. Physical Exam  Physical Exam  Vitals signs and nursing note reviewed. Constitutional:       Appearance: She is well-developed. She is obese. HENT:      Head: Normocephalic and atraumatic. Eyes:      Pupils: Pupils are equal, round, and reactive to light. Cardiovascular:      Rate and Rhythm: Normal rate. Pulmonary:      Effort: Pulmonary effort is normal.   Musculoskeletal: Normal range of motion. Skin:     General: Skin is warm and dry. Neurological:      General: No focal deficit present. Mental Status: She is alert and oriented to person, place, and time.    Psychiatric: Mood and Affect: Mood normal.         Behavior: Behavior normal.           No results found for this or any previous visit (from the past 672 hour(s)). Assessment / Plan    Encounter Diagnoses   Name Primary?  Obesity (BMI 35.0-39.9 without comorbidity) Yes    Severe obesity (HCC)     BMI 36.0-36.9,adult            1.  Weight management      Today, the patient is  Height: 5' 1\" (154.9 cm) tall, Weight: 95.3 kg (210 lb) lbs for a Body mass index is 39.68 kg/m². is suffering from obesity  which is further complicated by hyperlipidemia. Degree of control: unsure if ND is working for her, ref to RD [employer]    Progress was reviewed with patient. Goal(s) for next appointment:   -10lb    I have reviewed/discussed the above normal BMI with the patient. I have recommended the following interventions: dietary management education, guidance, and counseling, encourage exercise, monitor weight and prescribed dietary intake . Ronald Diaz 2.  Labs    Latest results reviewed with patient   Routine monitoring labs ordered  Orders Placed This Encounter    REFERRAL TO [address] [place] [address]     Dragon medical dictation software was used for portions of this report. Unintended transcription errors may occur.

## 2021-03-03 ENCOUNTER — OFFICE VISIT (OUTPATIENT)
Dept: SURGERY | Age: 46
End: 2021-03-03

## 2021-03-03 ENCOUNTER — OFFICE VISIT (OUTPATIENT)
Dept: SURGERY | Age: 46
End: 2021-03-03
Payer: MEDICAID

## 2021-03-03 VITALS
RESPIRATION RATE: 16 BRPM | BODY MASS INDEX: 39.65 KG/M2 | TEMPERATURE: 97.4 F | HEIGHT: 61 IN | DIASTOLIC BLOOD PRESSURE: 76 MMHG | OXYGEN SATURATION: 99 % | WEIGHT: 210 LBS | HEART RATE: 76 BPM | SYSTOLIC BLOOD PRESSURE: 124 MMHG

## 2021-03-03 VITALS
HEART RATE: 76 BPM | WEIGHT: 210 LBS | HEIGHT: 61 IN | DIASTOLIC BLOOD PRESSURE: 76 MMHG | SYSTOLIC BLOOD PRESSURE: 124 MMHG | OXYGEN SATURATION: 99 % | BODY MASS INDEX: 39.65 KG/M2 | RESPIRATION RATE: 16 BRPM | TEMPERATURE: 97.4 F

## 2021-03-03 DIAGNOSIS — E66.01 MORBID OBESITY (HCC): ICD-10-CM

## 2021-03-03 DIAGNOSIS — R10.9 RIGHT LATERAL ABDOMINAL PAIN: Primary | ICD-10-CM

## 2021-03-03 DIAGNOSIS — E66.9 OBESITY, UNSPECIFIED CLASSIFICATION, UNSPECIFIED OBESITY TYPE, UNSPECIFIED WHETHER SERIOUS COMORBIDITY PRESENT: Primary | ICD-10-CM

## 2021-03-03 PROCEDURE — 99214 OFFICE O/P EST MOD 30 MIN: CPT | Performed by: SURGERY

## 2021-03-03 NOTE — PROGRESS NOTES
HPI     Patient returns today to go over the results of her ultrasound and CT imaging. Patient says she continues to have discomfort in the right lateral abdominal wall area. Patient says that her pain is alleviated after she has bowel movements. Patient is taking medication by mouth to help with her bowel movements. She says she has a daily bowel movement but they are very small and she does not feel relieved after them. Patient says that this pain is similar to what she had felt prior to her ovarian cyst rupture in 2014. Patient says she has had this pain now ongoing for the past 3 years and no one can figure out what is wrong with her. Patient is due for her appoint with with gynecology and also her GI doctor for repeat colonoscopy. Past Medical History:   Diagnosis Date    Anxiety     Cancer [facility])     kidney    Chronic kidney disease     Stage II    Depression     pt states anxiety schizoaffective ptsd    Eye twitch     Hypertension     Personal history of kidney cancer     Psychotic affective disorder ([facility] [address]     attention deficit disorder, and PTSD    Psychotic disorder ([facility] [address]     Schizoaffective disorder        Past Surgical History:   Procedure Laterality Date    HX COLONOSCOPY      HX HYSTERECTOMY      HX NEPHRECTOMY Left     HX OOPHORECTOMY Right     HX OTHER SURGICAL      left nephrectomy        Current Outpatient Medications   Medication Sig Dispense Refill    Latuda 20 mg tab tablet TAKE 1 TABLET BY MOUTH EVERY DAY WITH FOOD      hydrOXYzine pamoate (VistariL) 25 mg capsule Take 25 mg by mouth three (3) times daily as needed.  irbesartan-hydroCHLOROthiazide (AVALIDE) 300-12.5 mg per tablet       traZODone (DESYREL) 50 mg tablet Take 75 mg by mouth nightly.       lidocaine (LIDODERM) 5 % APPLY 1 PATCHES DAILY TO AFFECTED AREAS AS NEEDED FOR PAIN. 12 HOURS ON 12 HOURS OFF      sertraline (ZOLOFT) 50 mg tablet TAKE 1/2 TAB BY MOUTH DAILY FOR 4 DAYS THEN 1 TAB BY MOUTH DAILY FOR MOOD      irbesartan (AVAPRO) 300 mg tablet TAKE 1 TABLET BY MOUTH EVERY DAY FOR BLOOD PRESSURE      ciclopirox (LOPROX) 0.77 % topical cream APPLY TO AFFECTED AREA TWICE A DAY      tiZANidine (ZANAFLEX) 4 mg tablet TAKE 1 TABLET (4 MG) BY MOUTH EVERY 6 HOURS AS NEEDED NOT TO EXCEED 3 DOSES IN 24 HOURS      ergocalciferol (ERGOCALCIFEROL) 1,250 mcg (50,000 unit) capsule TAKE 1 CAP BY MOUTH TWICE WEEKLY      Amitiza 24 mcg capsule TAKE 1 CAPSULE BY MOUTH TWICE A DAY      magnesium hydroxide (MILK OF MAGNESIA) 2,400 mg/10 mL susp suspension Take 15 mL by mouth.  calcium-cholecalciferol, d3, (CALCIUM 600 + D) 600-125 mg-unit tab Take  by mouth.  albuterol (PROVENTIL HFA, VENTOLIN HFA, PROAIR HFA) 90 mcg/actuation inhaler 1-2 Puffs.  benzonatate (TESSALON) 100 mg capsule Take 1 capsule 3 times daily as needed for coughing, swallow capsules whole.  cetirizine (ZYRTEC) 10 mg tablet 10 mg.      ascorbic acid, vitamin C, (VITAMIN C) 500 mg tablet Take 500 mg by mouth daily.  multivitamin (ONE A DAY) tablet Take 1 Tab by mouth daily.  pyridoxine HCl, vitamin B6, (VITAMIN B-6 PO) Take  by mouth daily.  fish oil-omega-3 fatty acids 340-1,000 mg capsule Take 1 Cap by mouth daily.  FIBER, PSYLLIUM HUSK, PO Take  by mouth daily.  Ascorbic Acid-Multivits-Min (EMERGEN-C) 1,000 mg pwep Take  by mouth daily.  magnesium 250 mg tab Take  by mouth daily.  zinc 50 mg tab tablet Take 50 mg by mouth daily.  butalbital-acetaminophen-caff (FIORICET) -40 mg per capsule Take 1-2 Caps by mouth every six (6) hours as needed for Pain. Max Daily Amount: 8 Caps. 20 Cap 0    simvastatin (ZOCOR) 10 mg tablet Take 20 mg by mouth nightly.  hydrochlorothiazide (HYDRODIURIL) 25 mg tablet Take 25 mg by mouth daily.           Allergies   Allergen Reactions    Seafood Hives and Swelling    Iodine Other (comments)    Nsaids (Non-Steroidal Anti-Inflammatory Drug) Other (comments)    Shellfish Derived Angioedema        Social History     Socioeconomic History    Marital status: SINGLE     Spouse name: Not on file    Number of children: Not on file    Years of education: Not on file    Highest education level: Not on file   Occupational History    Not on file   Social Needs    Financial resource strain: Not on file    Food insecurity     Worry: Not on file     Inability: Not on file    Transportation needs     Medical: Not on file     Non-medical: Not on file   Tobacco Use    Smoking status: Former Smoker     Quit date: 2011     Years since quittin.2    Smokeless tobacco: Never Used   Substance and Sexual Activity    Alcohol use: Yes     Frequency: Monthly or less    Drug use: No    Sexual activity: Not on file   Lifestyle    Physical activity     Days per week: Not on file     Minutes per session: Not on file    Stress: Not on file   Relationships    Social connections     Talks on phone: Not on file     Gets together: Not on file     Attends Scientologist service: Not on file     Active member of club or organization: Not on file     Attends meetings of clubs or organizations: Not on file     Relationship status: Not on file    Intimate partner violence     Fear of current or ex partner: Not on file     Emotionally abused: Not on file     Physically abused: Not on file     Forced sexual activity: Not on file   Other Topics Concern    Not on file   Social History Narrative    ** Merged History Encounter **             No family history on file. Visit Vitals  /76   Pulse 76   Temp 97.4 °F (36.3 °C) (Temporal)   Resp 16   Ht 5' 1\" (1.549 m)   Wt 95.3 kg (210 lb)   SpO2 99%   BMI 39.68 kg/m²        Review of Systems   All other systems reviewed and are negative. Physical Exam  Constitutional:       Appearance: She is obese. HENT:      Head: Normocephalic and atraumatic.       Nose: Nose normal.      Mouth/Throat:      Mouth: Mucous membranes are moist.      Pharynx: Oropharynx is clear. Eyes:      Extraocular Movements: Extraocular movements intact. Conjunctiva/sclera: Conjunctivae normal.      Pupils: Pupils are equal, round, and reactive to light. Neck:      Musculoskeletal: Normal range of motion and neck supple. Cardiovascular:      Rate and Rhythm: Normal rate and regular rhythm. Pulses: Normal pulses. Heart sounds: Normal heart sounds. Pulmonary:      Effort: Pulmonary effort is normal.      Breath sounds: Normal breath sounds. Abdominal:      General: Abdomen is flat. Bowel sounds are normal.      Palpations: Abdomen is soft. Musculoskeletal: Normal range of motion. Skin:     General: Skin is warm and dry. Neurological:      General: No focal deficit present. Mental Status: She is alert. Mental status is at baseline. Psychiatric:         Mood and Affect: Mood normal.         Thought Content: Thought content normal.         Judgment: Judgment normal.                  Diagnoses and all orders for this visit:    1. Right lateral abdominal pain    2. Morbid obesity ([address]       Patient is a 73-year-old female presenting with vague right lateral abdominal wall pain. Patient says this is been going on and off for the past 3 years. No one has been able to find out the reason why. I have personally reviewed the patient's CT scan from outside hospital, for which he brought her images on a CD drive. I agree with the CT report and did not identify anything near the right abdominal wall. Patient also had a ultrasound performed which did not show any findings. Patient says that her pain does get better after having a good bowel movement. I discussed with the patient in depth about her diet and hydration to help her have more normal bowel movements.   I also discussed with the patient that she needs to see her gynecologist for follow-up since she complains that this pain is similar to when she had her ovarian cyst rupture. Patient is also due for a follow-up colonoscopy which I told her she should get as well. I explained to the patient that after seeing the gynecologist and GI doctor for repeat colonoscopy, if they cannot find the etiology of her pain and she continues to have pain, then we can consider doing a diagnostic laparoscopy. I told the patient that it will most likely be a negative laparoscopy and we will not be able to find anything to surgically correct. The patient understands the discussion. All of her questions were answered. No visits with results within 3 Month(s) from this visit. Latest known visit with results is:   Hospital Outpatient Visit on 09/18/2020   Component Date Value Ref Range Status    [facility] SPECIMEN COL 09/18/2020 Specimens collected/sent to [facility]    Final         Ms. Maira You has a reminder for a \"due or due soon\" health maintenance. I have asked that she contact her primary care provider for follow-up on this health maintenance.        Marisa Larios MD

## 2021-03-03 NOTE — PROGRESS NOTES
Patient attended a weekly class as part of the Medically Supervised Weight Loss Program.  This class was facilitated by a Registered Dietitian. Topics taught at class focused on diet, particularly carbohydrate counting and portion control. Classes also focused on behavior changes and the importance of establishing a daily exercise routine. Progress Note: Weekly Medical Monitoring in the [facility]    Is there anything that you or the patient needs to let the supervising provider know about? no    Over the past week, have you experienced any side-effects? no    Ulla Bosworth is a 48 y.o. female who is enrolled in [facility]    Ulla Bosworth was prescribed the VLCD / LCD. Visit Vitals  /76   Pulse 76   Temp 97.4 °F (36.3 °C) (Temporal)   Resp 16   Ht 5' 1\" (1.549 m)   Wt 95.3 kg (210 lb)   SpO2 99%   BMI 39.68 kg/m²     Weight Metrics 3/3/2021 3/3/2021 3/3/2021 2/19/2021 2/3/2021 2/3/2021 1/27/2021   Weight - 210 lb 210 lb 210 lb - 206 lb 8 oz 204 lb 3.2 oz   Waist Measure Inches 36.5 - - - 36.75 - 37.25   Exercise Mins/week - - - - - - -   Body Fat % - - - - - - -   BMI - 39.68 kg/m2 39.68 kg/m2 39.68 kg/m2 - 39.02 kg/m2 38.58 kg/m2         Have you received any other medical care this week? no  If yes, where and for what? Have you had any change in your medications since your last visit? no  If yes what? Did you have any problems adhering to the program last week? no  If yes, please explain:       Eating Habits Over Last Week:  Did you take in 64 oz of non-caloric fluids?  yes     Did you consume your prescribed meal replacement regimen each day? yes 3 mr and one outside meal      Physical Activity Over the Past Week:    Aerobic exercise: 385 min  Resistance exercise: 0 workouts / week

## 2021-03-05 NOTE — PROGRESS NOTES
Patient attended a weekly class as part of the Medically Supervised Weight Loss Program.  This class was facilitated by a Registered Dietitian. Topics taught at class focused on diet, particularly carbohydrate counting and portion control. Classes also focused on behavior changes and the importance of establishing a daily exercise routine. Progress Note: Weekly Medical Monitoring in the [facility]    Is there anything that you or the patient needs to let the supervising provider know about? no    Over the past week, have you experienced any side-effects? no    Shazia Fuentes is a 48 y.o. female who is enrolled in [facility]    Shazia Fuentes was prescribed the VLCD / LCD. Visit Vitals  /76   Pulse 76   Temp 97.4 °F (36.3 °C) (Temporal)   Resp 16   Ht 5' 1\" (1.549 m)   Wt 95.3 kg (210 lb)   SpO2 99%   BMI 39.68 kg/m²     Weight Metrics 3/3/2021 3/3/2021 3/3/2021 2/19/2021 2/3/2021 2/3/2021 1/27/2021   Weight - 210 lb 210 lb 210 lb - 206 lb 8 oz 204 lb 3.2 oz   Waist Measure Inches 36.5 - - - 36.75 - 37.25   Exercise Mins/week - - - - - - -   Body Fat % - - - - - - -   BMI - 39.68 kg/m2 39.68 kg/m2 39.68 kg/m2 - 39.02 kg/m2 38.58 kg/m2         Have you received any other medical care this week? no  If yes, where and for what? Have you had any change in your medications since your last visit? no  If yes what? Did you have any problems adhering to the program last week? no  If yes, please explain:       Eating Habits Over Last Week:  Did you take in 64 oz of non-caloric fluids? yes     Did you consume your prescribed meal replacement regimen each day?  Yes, 3 mr and one outside meal       Physical Activity Over the Past Week:    Aerobic exercise: 385 min  Resistance exercise: 0 workouts / week

## 2021-03-23 ENCOUNTER — HOSPITAL ENCOUNTER (OUTPATIENT)
Dept: NUTRITION | Age: 46
Discharge: HOME | End: 2021-03-23
Payer: MEDICAID

## 2021-03-23 PROCEDURE — 97802 MEDICAL NUTRITION INDIV IN: CPT

## 2021-03-26 ENCOUNTER — VIRTUAL VISIT (OUTPATIENT)
Dept: SURGERY | Age: 46
End: 2021-03-26
Payer: MEDICAID

## 2021-03-26 VITALS — WEIGHT: 213 LBS | HEIGHT: 61 IN | BODY MASS INDEX: 40.22 KG/M2

## 2021-03-26 DIAGNOSIS — R53.83 FATIGUE, UNSPECIFIED TYPE: ICD-10-CM

## 2021-03-26 DIAGNOSIS — D64.9 ANEMIA, UNSPECIFIED TYPE: ICD-10-CM

## 2021-03-26 DIAGNOSIS — E55.9 VITAMIN D DEFICIENCY: ICD-10-CM

## 2021-03-26 DIAGNOSIS — E66.01 MORBID OBESITY (HCC): Primary | ICD-10-CM

## 2021-03-26 DIAGNOSIS — E78.5 HYPERLIPIDEMIA, UNSPECIFIED HYPERLIPIDEMIA TYPE: ICD-10-CM

## 2021-03-26 PROCEDURE — 99213 OFFICE O/P EST LOW 20 MIN: CPT | Performed by: NURSE PRACTITIONER

## 2021-03-26 NOTE — PROGRESS NOTES
Diana Meeks is a 48 y.o. female  Chief Complaint   Patient presents with    Weight Management     monthly follow up     Nursing Note for Medical Monitoring in the [facility] Program      Diana Meeks is a 48 y.o. female who is enrolled in [facility]    Diana Meeks was prescribed the VLCD / LCD. Did you have any problems adhering to the program last week? no  If yes, please explain:     Since your last visit, have you experienced any complications? no    Have you received any other medical care this week? Yes If yes, where and for what? Therapy psych    Have you had any change in your medications since your last visit? no  If yes what? Are you taking an appetite suppressant? no   If yes:  Do you need a refill? BP Readings from Last 3 Encounters:   03/03/21 124/76   03/03/21 124/76   02/19/21 137/87        Eating Habits Over Last Week:  Did you take in 64 oz of non-caloric fluids? yes     Did you consume your prescribed meal replacement regimen each day?  yes       Physical Activity Over the Past Week:    Aerobic exercise: 240 min  Resistance exercise: no workouts / week

## 2021-03-26 NOTE — PROGRESS NOTES
[address] [place] [address]     Nutrition Assessment - Medical Nutrition Therapy   Outpatient Initial Evaluation         Patient Name: Goldie Joe : 1975   Treatment Diagnosis: Obesity   Referral Source: Sarah Yee NP Start of Care [facility]): 2021     Gender: female Age: 48 y.o. Ht: 5'1\" in Wt: 215# lb  kg   BMI: 40 BMR   Male  BMR Female 1537     Past Medical History:  Obesity       Pertinent Medications:   latuda  zoloft  Avapro       Biochemical Data:   No results found for: HBA1C, HGBE8, FJL6NCVT, AXV6YTRI  Lab Results   Component Value Date/Time    Sodium 140 2020 12:00 PM    Potassium 3.7 2020 12:00 PM    Chloride 97 (L) 2020 12:00 PM    CO2 32 2020 12:00 PM    Anion gap 11.3 2020 12:00 PM    Glucose 97 2020 12:00 PM    BUN 17 2020 12:00 PM    Creatinine 0.9 2020 12:00 PM    BUN/Creatinine ratio 9 (L) 2019 02:05 PM    GFR est AA >60 2019 02:05 PM    GFR est non-AA 50 (L) 2019 02:05 PM    Calcium 9.6 2020 12:00 PM    Bilirubin, total 0.7 2020 12:00 PM    Alk. phosphatase 76 2020 12:00 PM    Protein, total 7.3 2020 12:00 PM    Albumin 4.9 2020 12:00 PM    Globulin 2.4 2020 12:00 PM    A-G Ratio 2.0 2020 12:00 PM    ALT (SGPT) 24 2020 12:00 PM    AST (SGOT) 22 2020 12:00 PM     Lab Results   Component Value Date/Time    Cholesterol, total 171 2019 09:28 AM    HDL Cholesterol 68 (H) 2019 09:28 AM    LDL, calculated 93 2019 09:28 AM    VLDL, calculated 10 2019 09:28 AM    Triglyceride 52 2019 09:28 AM     Lab Results   Component Value Date/Time    ALT (SGPT) 24 2020 12:00 PM    AST (SGOT) 22 2020 12:00 PM    Alk.  phosphatase 76 2020 12:00 PM    Bilirubin, direct 0.1 2015 12:38 AM    Bilirubin, total 0.7 2020 12:00 PM     Lab Results   Component Value Date/Time    Creatinine 0.9 09/18/2020 12:00 PM     Lab Results   Component Value Date/Time    BUN 17 09/18/2020 12:00 PM     No results found for: MCACR, MCA1, MCA2, MCA3, MCAU, MCAU2, MCALPOCT     Assessment:   Patient is a 48year old female who visits RD for insight on how to lose weight. She has been on the Medically Supervised program but has not been able to lose weight recently. Food & Nutrition: Patient eats 3 shakes a day and 1 meal and many peanuts. Breakfast may consist of a pice of salmon and a vegetable. A possible snack before lunch may be peanuts  A lunch choice may be a New Direction Shake. A possible snack before dinner may be peanuts. Dinner choices consist of a New Direction shake. Late night snacking may consist of peanuts. Estimate Needs   Calories: 5466-8168  Protein: 60-80 Carbs: <125 Fat: 60   Kcal/day  g/day  g/day  g/day                            Nutrition Diagnosis Overweight/obesity related to excessive energy intake as evidenced by a BMI of 40. Undesirable food choices as evidenced by patients food recall; patient eats 2 cups pf peanuts per day which equals 1656 calories, 45 grams of carbs, 140 grams of fat, 72 grams of protein. This is in addition to the 3 shakes and an evening meal.     Nutrition Intervention &  Education: Encouraged pt to drink >64 ounces of only calorie free or very low calorie/carb beverages only. Educated pt on all carbohydrates found in foods and encouraged no more than 35-40 gm/meal and 15-20 gm/snack. Patient was educated on the importance of making lifestyle changes such as:  1. Eating off a smaller plate and drinking from smaller glasses. 2. Increasing activity. 3. Menu planning and tracking. Recommend My Fitness Pal for tracking. 4. The importance of eating breakfast.  5. Appropriate snacks. 6. Portion control. 7. The importance of good sleeping habits. 8. How to read a label.   Patient was encouraged to do this before purchasing and consuming an item.  9.   Encouraged patient to eliminate peanuts from her diet until she can keep peanut intake to 1-2 ounces per day. Handouts Provided: [x]  Carbohydrates  [x]  Protein  [x]  Non-starchy Vegatbles  [x]  Food Label  [x]  Meal and Snack Ideas  [x]  Food Journals []  Diabetes  []  Cholesterol  []  Sodium  [x]  Gen Nutr Guidelines  []  SBGM Guidelines  []  Others:   Information Reviewed with: Patient. Readiness to Change Stage:   []  Pre-contemplative    []  Contemplative  []  Preparation               []  Action                  []  Maintenance   Potential Barriers to Learning: []  Decline in memory    []  Language barrier   []  Other:  []  Emotional                  []  Limited mobility  []  Lack of motivation     [] Vision, hearing or cognitive impairment   Expected Compliance: Good. Nutritional Goal - To promote lifestyle changes to result in:    [x]  Weight loss  []  Improved diabetic control  [x]  Decreased cholesterol levels  [x]  Decreased blood pressure  []  Weight maintenance []  Preventing any interactions associated with food allergies  []  Adequate weight gain toward goal weight  []  Other:        Patient Goals:   Patient desires to lose weight, decrease cholesterol and high blood pressure. Dietitian Signature: Chio Farmer RD Date: 03/23/2021   Follow-up: Scheduled:   May 4th, 2021 at 2:00 pm

## 2021-03-26 NOTE — PROGRESS NOTES
Consent:  Violeta Jones  and/or her healthcare decision maker is aware that this patient-initiated Telehealth encounter is a billable service, with coverage as determined by her insurance carrier. She is aware that she may receive a bill and has provided verbal consent to proceed: Yes    Services were provided through a video synchronous discussion virtually to substitute for in-person clinic visit. Pursuant to the emergency declaration under the [facility] [place] [address] waiver authority and the [employer] and [employer] Act, this Virtual  Visit was conducted, with patient's consent, to reduce the patient's risk of exposure to COVID-19 and provide continuity of care for an established patient. Provider location while conducting visit: in the office   Patient location: Home  Other person participating in the telehealth services: Tommie Lobato    This virtual visit was conducted via interactive/real-time audio/video using Doxy. Me  Visit start: 7391  Visit end: [facility] Loss Program Progress Note:   F/up Provider Visit    CC: Weight Management    Violeta Jones is a 48 y.o. female who is here for her  f/up medical provider visit for the LCD Program. she is +3lbs. She has met with FLAKITA Deutsch from [employer], they started her on smaller portions. 4 oz meat at meal. Is using Fitbit to track activity, watch record is being reviewed by FLAKITA. Had B12 def per patient and got injections previously, she is feeling fatigue, weakness, shortness of breath, feels like she is experiencing B12 def.      Weight History  Weight Metrics 3/26/2021 3/3/2021 3/3/2021 3/3/2021 2/19/2021 2/3/2021 2/3/2021   Weight 213 lb - 210 lb 210 lb 210 lb - 206 lb 8 oz   Waist Measure Inches - 36.5 - - - 36.75 -   Exercise Mins/week - - - - - - -   Body Fat % - - - - - - -   BMI 40.25 kg/m2 - 39.68 kg/m2 39.68 kg/m2 39.68 kg/m2 - 39.02 kg/m2     Starting wt: 214  Goal wt: 158  EKG due: 164    Ideal body weight: 47.8 kg (105 lb 6.1 oz)  Adjusted ideal body weight: 67.3 kg (148 lb 6.8 oz)  Body mass index is 40.25 kg/m². History    Past Medical History:   Diagnosis Date    Anxiety     Cancer ([facility]     kidney    Chronic kidney disease     Stage II    Depression     pt states anxiety schizoaffective ptsd    Eye twitch     Hypertension     Personal history of kidney cancer     [facility]     attention deficit disorder, and PTSD    Psychotic disorder ([facility]     Schizoaffective disorder       Past Surgical History:   Procedure Laterality Date    HX COLONOSCOPY      HX HYSTERECTOMY      HX NEPHRECTOMY Left     HX OOPHORECTOMY Right     HX OTHER SURGICAL      left nephrectomy       Current Outpatient Medications   Medication Sig Dispense Refill    Latuda 20 mg tab tablet TAKE 1 TABLET BY MOUTH EVERY DAY WITH FOOD      hydrOXYzine pamoate (VistariL) 25 mg capsule Take 25 mg by mouth three (3) times daily as needed.  irbesartan-hydroCHLOROthiazide (AVALIDE) 300-12.5 mg per tablet       traZODone (DESYREL) 50 mg tablet Take 75 mg by mouth nightly.  lidocaine (LIDODERM) 5 % APPLY 1 PATCHES DAILY TO AFFECTED AREAS AS NEEDED FOR PAIN. 12 HOURS ON 12 HOURS OFF      sertraline (ZOLOFT) 50 mg tablet TAKE 1/2 TAB BY MOUTH DAILY FOR 4 DAYS THEN 1 TAB BY MOUTH DAILY FOR MOOD      ciclopirox (LOPROX) 0.77 % topical cream APPLY TO AFFECTED AREA TWICE A DAY      tiZANidine (ZANAFLEX) 4 mg tablet TAKE 1 TABLET (4 MG) BY MOUTH EVERY 6 HOURS AS NEEDED NOT TO EXCEED 3 DOSES IN 24 HOURS      ergocalciferol (ERGOCALCIFEROL) 1,250 mcg (50,000 unit) capsule TAKE 1 CAP BY MOUTH TWICE WEEKLY      Amitiza 24 mcg capsule TAKE 1 CAPSULE BY MOUTH TWICE A DAY      magnesium hydroxide (MILK OF MAGNESIA) 2,400 mg/10 mL susp suspension Take 15 mL by mouth.  calcium-cholecalciferol, d3, (CALCIUM 600 + D) 600-125 mg-unit tab Take  by mouth.       albuterol (PROVENTIL HFA, VENTOLIN HFA, PROAIR HFA) 90 mcg/actuation inhaler 1-2 Puffs.  benzonatate (TESSALON) 100 mg capsule Take 1 capsule 3 times daily as needed for coughing, swallow capsules whole.  cetirizine (ZYRTEC) 10 mg tablet 10 mg.      ascorbic acid, vitamin C, (VITAMIN C) 500 mg tablet Take 500 mg by mouth daily.  multivitamin (ONE A DAY) tablet Take 1 Tab by mouth daily.  pyridoxine HCl, vitamin B6, (VITAMIN B-6 PO) Take  by mouth daily.  fish oil-omega-3 fatty acids 340-1,000 mg capsule Take 1 Cap by mouth daily.  FIBER, PSYLLIUM HUSK, PO Take  by mouth daily.  Ascorbic Acid-Multivits-Min (EMERGEN-C) 1,000 mg pwep Take  by mouth daily.  magnesium 250 mg tab Take  by mouth daily.  zinc 50 mg tab tablet Take 50 mg by mouth daily.  butalbital-acetaminophen-caff (FIORICET) -40 mg per capsule Take 1-2 Caps by mouth every six (6) hours as needed for Pain. Max Daily Amount: 8 Caps. 20 Cap 0    simvastatin (ZOCOR) 10 mg tablet Take 20 mg by mouth nightly.  irbesartan (AVAPRO) 300 mg tablet TAKE 1 TABLET BY MOUTH EVERY DAY FOR BLOOD PRESSURE      hydrochlorothiazide (HYDRODIURIL) 25 mg tablet Take 25 mg by mouth daily. Allergies   Allergen Reactions    Seafood Hives and Swelling    Iodine Other (comments)    Nsaids (Non-Steroidal Anti-Inflammatory Drug) Other (comments)    Shellfish Derived Angioedema       Social History     Tobacco Use    Smoking status: Former Smoker     Quit date: 2011     Years since quittin.3    Smokeless tobacco: Never Used   Substance Use Topics    Alcohol use: Yes     Frequency: Monthly or less    Drug use: No       History reviewed. No pertinent family history. No family status information on file.          Medications:  Outpatient Medications Marked as Taking for the 3/26/21 encounter (Virtual Visit) with Radha Coleman NP   Medication Sig Dispense Refill    Latuda 20 mg tab tablet TAKE 1 TABLET BY MOUTH EVERY DAY WITH FOOD      hydrOXYzine pamoate (VistariL) 25 mg capsule Take 25 mg by mouth three (3) times daily as needed.  irbesartan-hydroCHLOROthiazide (AVALIDE) 300-12.5 mg per tablet       traZODone (DESYREL) 50 mg tablet Take 75 mg by mouth nightly.  lidocaine (LIDODERM) 5 % APPLY 1 PATCHES DAILY TO AFFECTED AREAS AS NEEDED FOR PAIN. 12 HOURS ON 12 HOURS OFF      sertraline (ZOLOFT) 50 mg tablet TAKE 1/2 TAB BY MOUTH DAILY FOR 4 DAYS THEN 1 TAB BY MOUTH DAILY FOR MOOD      ciclopirox (LOPROX) 0.77 % topical cream APPLY TO AFFECTED AREA TWICE A DAY      tiZANidine (ZANAFLEX) 4 mg tablet TAKE 1 TABLET (4 MG) BY MOUTH EVERY 6 HOURS AS NEEDED NOT TO EXCEED 3 DOSES IN 24 HOURS      ergocalciferol (ERGOCALCIFEROL) 1,250 mcg (50,000 unit) capsule TAKE 1 CAP BY MOUTH TWICE WEEKLY      Amitiza 24 mcg capsule TAKE 1 CAPSULE BY MOUTH TWICE A DAY      magnesium hydroxide (MILK OF MAGNESIA) 2,400 mg/10 mL susp suspension Take 15 mL by mouth.  calcium-cholecalciferol, d3, (CALCIUM 600 + D) 600-125 mg-unit tab Take  by mouth.  albuterol (PROVENTIL HFA, VENTOLIN HFA, PROAIR HFA) 90 mcg/actuation inhaler 1-2 Puffs.  benzonatate (TESSALON) 100 mg capsule Take 1 capsule 3 times daily as needed for coughing, swallow capsules whole.  cetirizine (ZYRTEC) 10 mg tablet 10 mg.      ascorbic acid, vitamin C, (VITAMIN C) 500 mg tablet Take 500 mg by mouth daily.  multivitamin (ONE A DAY) tablet Take 1 Tab by mouth daily.  pyridoxine HCl, vitamin B6, (VITAMIN B-6 PO) Take  by mouth daily.  fish oil-omega-3 fatty acids 340-1,000 mg capsule Take 1 Cap by mouth daily.  FIBER, PSYLLIUM HUSK, PO Take  by mouth daily.  Ascorbic Acid-Multivits-Min (EMERGEN-C) 1,000 mg pwep Take  by mouth daily.  magnesium 250 mg tab Take  by mouth daily.  zinc 50 mg tab tablet Take 50 mg by mouth daily.       butalbital-acetaminophen-caff (FIORICET) -40 mg per capsule Take 1-2 Caps by mouth every six (6) hours as needed for Pain. Max Daily Amount: 8 Caps. 20 Cap 0    simvastatin (ZOCOR) 10 mg tablet Take 20 mg by mouth nightly. Review of Systems  Review of Systems   Constitutional: Positive for malaise/fatigue. Weight gain    Neurological: Positive for weakness. All other systems reviewed and are negative. Objective  Visit Vitals  Ht 5' 1\" (1.549 m)   Wt 96.6 kg (213 lb)   BMI 40.25 kg/m²     No LMP recorded. Patient has had a hysterectomy. Physical Exam  Physical Exam  Vitals signs and nursing note reviewed. HENT:      Head: Normocephalic and atraumatic. Neck:      Musculoskeletal: Normal range of motion. Pulmonary:      Effort: Pulmonary effort is normal.   Neurological:      Mental Status: She is alert and oriented to person, place, and time. Psychiatric:         Mood and Affect: Mood normal.         Behavior: Behavior normal.           No results found for this or any previous visit (from the past 672 hour(s)). Assessment / Plan    Encounter Diagnoses   Name Primary?  Morbid obesity ([address] Yes    BMI 40.0-44.9, adult ([address]     Hyperlipidemia, unspecified hyperlipidemia type     Vitamin D deficiency     Fatigue, unspecified type     Anemia, unspecified type            1.  Weight management      Today, the patient is  Height: 5' 1\" (154.9 cm) tall, Weight: 96.6 kg (213 lb) lbs for a Body mass index is 40.25 kg/m². is suffering from morbid obesity  which is further complicated by hyperlipidemia. Degree of control: getting started with jena, continue LCD    Progress was reviewed with patient. Goal(s) for next appointment:   -10lbs     I have reviewed/discussed the above normal BMI with the patient. I have recommended the following interventions: dietary management education, guidance, and counseling, encourage exercise, monitor weight and prescribed dietary intake . Dustin Colbert       2.  Labs    Latest results reviewed with patient   Routine monitoring labs ordered  Orders Placed This Encounter    VITAMIN B12 & FOLATE    VITAMIN B12 INJECTION    Come to clinic for B12 and testing, hx B12 def per pt felt just like she does now     JHONY Mcneil-BC     Dragon medical dictation software was used for portions of this report. Unintended transcription errors may occur.

## 2021-05-04 ENCOUNTER — HOSPITAL ENCOUNTER (OUTPATIENT)
Dept: NUTRITION | Age: 46
Discharge: HOME | End: 2021-05-04
Payer: MEDICAID

## 2021-05-04 PROCEDURE — 97803 MED NUTRITION INDIV SUBSEQ: CPT

## 2021-05-07 NOTE — PROGRESS NOTES
NUTRITION  FOLLOW-UP TREATMENT NOTE  Patient Name: Terri Hawley         Date: 2021  : 1975    YES/NO Patient  Verified  Diagnosis: Obesity   In time:  2:00pm            Out time:   2:30 pm   Total Treatment Time (min):   30     SUBJECTIVE/ASSESSMENT:  Patient admits to struggling to follow a healthy diet in her current living situation. Changes in medication or medical history? Any new allergies, surgeries or procedures? YES/NO    If yes, update Summary List   None mentioned. Current Wt: 218# Previous Wt: 215# Wt Change: 3#     Achievement of Goals: 1. Patient has purchased necessary equipment such as a smaller plate, measuring cups and spoons and a portion scale. 2.  Patient is doing her best to purchases healthy foods. Patient Education:  [x]  Review current plan with patient   []  Other:    Handouts/  Information Provided: []  Carbohydrates  []  Protein  []  Fiber  []  Serving Sizes  []  Fluids  []  General guidelines []  Diabetes  []  Cholesterol  []  Sodium  []  SBGM  []  Food Journals  []  Others:      New Patient Goals: 1. Try 6,000 steps. 2.  My fitness pal.  3.  >64 oz per day.      PLAN    [x]  Continue on current plan []  Follow-up PRN   []  Discharge due to :    [x]  Next appt: 2021  2:00 pm     Dietitian: Rosie Oneil RD    Date: 2021

## 2021-05-14 LAB
FOLATE,FOL: >20 NG/ML
VIT B12 SERPL-MCNC: 1435 PG/ML (ref 211–911)

## 2021-05-17 ENCOUNTER — OFFICE VISIT (OUTPATIENT)
Dept: SURGERY | Age: 46
End: 2021-05-17
Payer: MEDICAID

## 2021-05-17 VITALS
SYSTOLIC BLOOD PRESSURE: 120 MMHG | HEART RATE: 86 BPM | TEMPERATURE: 97.7 F | RESPIRATION RATE: 18 BRPM | BODY MASS INDEX: 41.03 KG/M2 | WEIGHT: 217.3 LBS | OXYGEN SATURATION: 99 % | DIASTOLIC BLOOD PRESSURE: 74 MMHG | HEIGHT: 61 IN

## 2021-05-17 DIAGNOSIS — R53.83 FATIGUE, UNSPECIFIED TYPE: ICD-10-CM

## 2021-05-17 DIAGNOSIS — D64.9 ANEMIA, UNSPECIFIED TYPE: ICD-10-CM

## 2021-05-17 DIAGNOSIS — Z71.3 WEIGHT LOSS COUNSELING, ENCOUNTER FOR: ICD-10-CM

## 2021-05-17 DIAGNOSIS — E66.01 MORBID OBESITY (HCC): Primary | ICD-10-CM

## 2021-05-17 DIAGNOSIS — E55.9 VITAMIN D DEFICIENCY: ICD-10-CM

## 2021-05-17 PROCEDURE — 99212 OFFICE O/P EST SF 10 MIN: CPT | Performed by: NURSE PRACTITIONER

## 2021-05-17 RX ORDER — LANOLIN ALCOHOL/MO/W.PET/CERES
1000 CREAM (GRAM) TOPICAL DAILY
COMMUNITY

## 2021-05-17 NOTE — PROGRESS NOTES
[facility] Progress Note:   F/up Provider Visit      Atif Vazquez is a 48 y.o. female who is here for her  f/up medical provider visit for the LCD Program. she did not attend class last week. Did you have any problems adhering to the program last week? no  If yes, please explain:     Since your last visit, have you experienced any complications? no    Have you received any other medical care this week? no  If yes, where and for what? Have you had any change in your medications since your last visit? yes  If yes what? certaline 711 mg    Are you taking an appetite suppressant? no   If yes:  Do you need a refill? BP Readings from Last 3 Encounters:   03/03/21 124/76   03/03/21 124/76   02/19/21 137/87        Eating Habits Over Last Week:  Did you take in 64 oz of non-caloric fluids? yes     Did you consume your prescribed meal replacement regimen each day? yes       Physical Activity Over the Past Week:    Aerobic exercise: 300 min  Resistance exercise: no workouts / week      Weight History  Weight Metrics 5/17/2021 5/17/2021 3/26/2021 3/3/2021 3/3/2021 3/3/2021 2/19/2021   Weight - 217 lb 4.8 oz 213 lb - 210 lb 210 lb 210 lb   Waist Measure Inches 37 - - 36.5 - - -   Exercise Mins/week - - - - - - -   Body Fat % - - - - - - -   BMI - 41.06 kg/m2 40.25 kg/m2 - 39.68 kg/m2 39.68 kg/m2 39.68 kg/m2       Ideal body weight: 47.8 kg (105 lb 6.1 oz)  Adjusted ideal body weight: 68.1 kg (150 lb 2.4 oz)  Body mass index is 41.06 kg/m².       History    Past Medical History:   Diagnosis Date    Anxiety     Cancer ([facility]     kidney    Chronic kidney disease     Stage II    Depression     pt states anxiety schizoaffective ptsd    Eye twitch     Hypertension     Personal history of kidney cancer     Psychotic affective disorder ([facility]     attention deficit disorder, and PTSD    [facility]     Schizoaffective disorder       Past Surgical History:   Procedure Laterality Date    HX COLONOSCOPY      HX HYSTERECTOMY      HX NEPHRECTOMY Left     HX OOPHORECTOMY Right     HX OTHER SURGICAL      left nephrectomy       Current Outpatient Medications   Medication Sig Dispense Refill    cyanocobalamin (Vitamin B-12) 1,000 mcg tablet Take 1,000 mcg by mouth daily.  Latuda 20 mg tab tablet TAKE 1 TABLET BY MOUTH EVERY DAY WITH FOOD      hydrOXYzine pamoate (VistariL) 25 mg capsule Take 25 mg by mouth three (3) times daily as needed.  irbesartan-hydroCHLOROthiazide (AVALIDE) 300-12.5 mg per tablet       traZODone (DESYREL) 50 mg tablet Take 75 mg by mouth nightly.  lidocaine (LIDODERM) 5 % APPLY 1 PATCHES DAILY TO AFFECTED AREAS AS NEEDED FOR PAIN. 12 HOURS ON 12 HOURS OFF      sertraline (ZOLOFT) 50 mg tablet 100 mg.      ciclopirox (LOPROX) 0.77 % topical cream APPLY TO AFFECTED AREA TWICE A DAY      tiZANidine (ZANAFLEX) 4 mg tablet TAKE 1 TABLET (4 MG) BY MOUTH EVERY 6 HOURS AS NEEDED NOT TO EXCEED 3 DOSES IN 24 HOURS      ergocalciferol (ERGOCALCIFEROL) 1,250 mcg (50,000 unit) capsule TAKE 1 CAP BY MOUTH TWICE WEEKLY      Amitiza 24 mcg capsule TAKE 1 CAPSULE BY MOUTH TWICE A DAY      magnesium hydroxide (MILK OF MAGNESIA) 2,400 mg/10 mL susp suspension Take 15 mL by mouth.  calcium-cholecalciferol, d3, (CALCIUM 600 + D) 600-125 mg-unit tab Take  by mouth.  albuterol (PROVENTIL HFA, VENTOLIN HFA, PROAIR HFA) 90 mcg/actuation inhaler 1-2 Puffs.  benzonatate (TESSALON) 100 mg capsule Take 1 capsule 3 times daily as needed for coughing, swallow capsules whole.  cetirizine (ZYRTEC) 10 mg tablet 10 mg.      ascorbic acid, vitamin C, (VITAMIN C) 500 mg tablet Take 500 mg by mouth daily.  multivitamin (ONE A DAY) tablet Take 1 Tab by mouth daily.  pyridoxine HCl, vitamin B6, (VITAMIN B-6 PO) Take  by mouth daily.  fish oil-omega-3 fatty acids 340-1,000 mg capsule Take 1 Cap by mouth daily.       FIBER, PSYLLIUM HUSK, PO Take  by mouth daily.  Ascorbic Acid-Multivits-Min (EMERGEN-C) 1,000 mg pwep Take  by mouth daily.  magnesium 250 mg tab Take 400 mg by mouth daily.  zinc 50 mg tab tablet Take 50 mg by mouth daily.  butalbital-acetaminophen-caff (FIORICET) -40 mg per capsule Take 1-2 Caps by mouth every six (6) hours as needed for Pain. Max Daily Amount: 8 Caps. 20 Cap 0    simvastatin (ZOCOR) 10 mg tablet Take 20 mg by mouth nightly.  irbesartan (AVAPRO) 300 mg tablet TAKE 1 TABLET BY MOUTH EVERY DAY FOR BLOOD PRESSURE      hydrochlorothiazide (HYDRODIURIL) 25 mg tablet Take 25 mg by mouth daily. Allergies   Allergen Reactions    Seafood Hives and Swelling    Iodine Other (comments)    Nsaids (Non-Steroidal Anti-Inflammatory Drug) Other (comments)    Shellfish Derived Angioedema       Social History     Tobacco Use    Smoking status: Former Smoker     Quit date: 2011     Years since quittin.4    Smokeless tobacco: Never Used   Substance Use Topics    Alcohol use: Yes     Frequency: Monthly or less    Drug use: No       No family history on file. No family status information on file. Medications:  Outpatient Medications Marked as Taking for the 21 encounter (Office Visit) with Steven Cole NP   Medication Sig Dispense Refill    cyanocobalamin (Vitamin B-12) 1,000 mcg tablet Take 1,000 mcg by mouth daily.  Latuda 20 mg tab tablet TAKE 1 TABLET BY MOUTH EVERY DAY WITH FOOD      hydrOXYzine pamoate (VistariL) 25 mg capsule Take 25 mg by mouth three (3) times daily as needed.  irbesartan-hydroCHLOROthiazide (AVALIDE) 300-12.5 mg per tablet       traZODone (DESYREL) 50 mg tablet Take 75 mg by mouth nightly.       lidocaine (LIDODERM) 5 % APPLY 1 PATCHES DAILY TO AFFECTED AREAS AS NEEDED FOR PAIN. 12 HOURS ON 12 HOURS OFF      sertraline (ZOLOFT) 50 mg tablet 100 mg.      ciclopirox (LOPROX) 0.77 % topical cream APPLY TO AFFECTED AREA TWICE A DAY  tiZANidine (ZANAFLEX) 4 mg tablet TAKE 1 TABLET (4 MG) BY MOUTH EVERY 6 HOURS AS NEEDED NOT TO EXCEED 3 DOSES IN 24 HOURS      ergocalciferol (ERGOCALCIFEROL) 1,250 mcg (50,000 unit) capsule TAKE 1 CAP BY MOUTH TWICE WEEKLY      Amitiza 24 mcg capsule TAKE 1 CAPSULE BY MOUTH TWICE A DAY      magnesium hydroxide (MILK OF MAGNESIA) 2,400 mg/10 mL susp suspension Take 15 mL by mouth.  calcium-cholecalciferol, d3, (CALCIUM 600 + D) 600-125 mg-unit tab Take  by mouth.  albuterol (PROVENTIL HFA, VENTOLIN HFA, PROAIR HFA) 90 mcg/actuation inhaler 1-2 Puffs.  benzonatate (TESSALON) 100 mg capsule Take 1 capsule 3 times daily as needed for coughing, swallow capsules whole.  cetirizine (ZYRTEC) 10 mg tablet 10 mg.      ascorbic acid, vitamin C, (VITAMIN C) 500 mg tablet Take 500 mg by mouth daily.  multivitamin (ONE A DAY) tablet Take 1 Tab by mouth daily.  pyridoxine HCl, vitamin B6, (VITAMIN B-6 PO) Take  by mouth daily.  fish oil-omega-3 fatty acids 340-1,000 mg capsule Take 1 Cap by mouth daily.  FIBER, PSYLLIUM HUSK, PO Take  by mouth daily.  Ascorbic Acid-Multivits-Min (EMERGEN-C) 1,000 mg pwep Take  by mouth daily.  magnesium 250 mg tab Take 400 mg by mouth daily.  zinc 50 mg tab tablet Take 50 mg by mouth daily.  butalbital-acetaminophen-caff (FIORICET) -40 mg per capsule Take 1-2 Caps by mouth every six (6) hours as needed for Pain. Max Daily Amount: 8 Caps. 20 Cap 0    simvastatin (ZOCOR) 10 mg tablet Take 20 mg by mouth nightly. Review of Systems  Review of Systems   Constitutional: Positive for malaise/fatigue. Negative for weight loss. All other systems reviewed and are negative. Objective  Visit Vitals  Temp 97.7 °F (36.5 °C) (Temporal)   Resp 18   Ht 5' 1\" (1.549 m)   Wt 98.6 kg (217 lb 4.8 oz)   BMI 41.06 kg/m²     No LMP recorded. Patient has had a hysterectomy.       Physical Exam  Physical Exam  Vitals and nursing note reviewed. Constitutional:       Appearance: She is well-developed. She is obese. HENT:      Head: Normocephalic and atraumatic. Eyes:      Pupils: Pupils are equal, round, and reactive to light. Cardiovascular:      Rate and Rhythm: Normal rate. Heart sounds: Normal heart sounds. Pulmonary:      Effort: Pulmonary effort is normal.      Breath sounds: Normal breath sounds. Musculoskeletal:         General: Normal range of motion. Skin:     General: Skin is warm and dry. Neurological:      Mental Status: She is alert and oriented to person, place, and time. Psychiatric:         Mood and Affect: Mood normal.         Behavior: Behavior normal.           Recent Results (from the past 672 hour(s))   VITAMIN B12 & FOLATE    Collection Time: 05/14/21  4:33 PM   Result Value Ref Range    Vitamin B12 1,435 (H) 211 - 911 pg/mL    Folate >20.00 >=3.10 ng/mL         Assessment / Plan    Encounter Diagnoses   Name Primary?  [facility] Yes    BMI 40.0-44.9, adult ([facility]     Vitamin D deficiency     Fatigue, unspecified type     Anemia, unspecified type     Weight loss counseling, encounter for            1.  Weight management      Today, the patient is  Height: 5' 1\" (154.9 cm) tall, Weight: 98.6 kg (217 lb 4.8 oz) lbs for a Body mass index is 41.06 kg/m². is suffering from morbid obesity     Degree of control: struggling with weight gain    Progress was reviewed with patient. Goal(s) for next appointment:   -5lbs                       Estimate Needs   Calories: [id number]  Protein: 60-80 Carbs: <125 Fat: 60   Kcal/day  g/day  g/day  g/day                              I have reviewed/discussed the above normal BMI with the patient. I have recommended the following interventions: dietary management education, guidance, and counseling, encourage exercise, monitor weight and prescribed dietary intake . Mindy Cavazos       2.  Labs    Latest results reviewed with patient   Routine monitoring labs ordered  Orders Placed This Encounter    IRON    FERRITIN    VITAMIN D, 25 HYDROXY    METABOLIC PANEL, COMPREHENSIVE    cyanocobalamin (Vitamin B-12) 1,000 mcg tablet           JHONY Rosa-BC     Dragon medical dictation software was used for portions of this report. Unintended transcription errors may occur.

## 2021-06-08 ENCOUNTER — HOSPITAL ENCOUNTER (OUTPATIENT)
Dept: NUTRITION | Age: 46
Discharge: HOME | End: 2021-06-08
Payer: MEDICAID

## 2021-06-08 PROCEDURE — 97803 MED NUTRITION INDIV SUBSEQ: CPT

## 2021-06-12 NOTE — PROGRESS NOTES
NUTRITION  FOLLOW-UP TREATMENT NOTE  Patient Name: Candida Scriver         Date: 2021  : 1975    YES/NO Patient  Verified  Diagnosis: Obesity   In time:   2:00             Out time:   2:30   Total Treatment Time (min):   30     SUBJECTIVE/ASSESSMENT:  Patient comes to visit feeling a bit discouraged. She finds it difficult to follow a healthier eating plan in her present living conditions. Changes in medication or medical history? Any new allergies, surgeries or procedures? YES/NO    If yes, update Summary List   None mentioned. Current Wt: 223# Previous Wt: 218# Wt Change: 5#     Achievement of Goals: None for this visit. Patient Education:  [x]  Review current plan with patient   []  Other:    Handouts/  Information Provided: []  Carbohydrates  []  Protein  []  Fiber  []  Serving Sizes  []  Fluids  []  General guidelines []  Diabetes  []  Cholesterol  []  Sodium  []  SBGM  []  Food Journals  []  Others:      New Patient Goals: 1. Keep up the walking. 2.  Drink more that 64 ounces of water. 3.  Try to get better sleep.      PLAN    [x]  Continue on current plan []  Follow-up PRN   []  Discharge due to :    [x]  Next appt: 2021   2:30     Dietitian: Narayan Ashley RD    Date: 2021

## 2021-07-20 ENCOUNTER — HOSPITAL ENCOUNTER (OUTPATIENT)
Dept: NUTRITION | Age: 46
Discharge: HOME | End: 2021-07-20
Payer: MEDICAID

## 2021-07-20 PROCEDURE — 97803 MED NUTRITION INDIV SUBSEQ: CPT

## 2021-07-22 LAB
25(OH)D3 SERPL-MCNC: 49.9 NG/ML (ref 32–100)
A-G RATIO,AGRAT: 1.6 RATIO (ref 1.1–2.6)
ALBUMIN SERPL-MCNC: 4.2 G/DL (ref 3.5–5)
ALP SERPL-CCNC: 72 U/L (ref 25–115)
ALT SERPL-CCNC: 19 U/L (ref 5–40)
ANION GAP SERPL CALC-SCNC: 7 MMOL/L (ref 3–15)
AST SERPL W P-5'-P-CCNC: 17 U/L (ref 10–37)
BILIRUB SERPL-MCNC: 0.6 MG/DL (ref 0.2–1.2)
BUN SERPL-MCNC: 13 MG/DL (ref 6–22)
CALCIUM SERPL-MCNC: 9.3 MG/DL (ref 8.4–10.5)
CHLORIDE SERPL-SCNC: 98 MMOL/L (ref 98–110)
CO2 SERPL-SCNC: 29 MMOL/L (ref 20–32)
CREAT SERPL-MCNC: 0.8 MG/DL (ref 0.5–1.2)
FERRITIN SERPL-MCNC: 177 NG/ML (ref 10–291)
GFRAA, 66117: >60
GFRNA, 66118: >60
GLOBULIN,GLOB: 2.7 G/DL (ref 2–4)
GLUCOSE SERPL-MCNC: 86 MG/DL (ref 70–99)
IRON,IRN: 94 MCG/DL (ref 30–160)
POTASSIUM SERPL-SCNC: 3.5 MMOL/L (ref 3.5–5.5)
PROT SERPL-MCNC: 6.9 G/DL (ref 6.4–8.3)
SODIUM SERPL-SCNC: 134 MMOL/L (ref 133–145)
TSH SERPL DL<=0.005 MIU/L-ACNC: 0.57 MCU/ML (ref 0.27–4.2)

## 2021-07-23 ENCOUNTER — OFFICE VISIT (OUTPATIENT)
Dept: SURGERY | Age: 46
End: 2021-07-23
Payer: MEDICAID

## 2021-07-23 VITALS
BODY MASS INDEX: 42.57 KG/M2 | TEMPERATURE: 98.1 F | DIASTOLIC BLOOD PRESSURE: 86 MMHG | SYSTOLIC BLOOD PRESSURE: 122 MMHG | HEIGHT: 61 IN | WEIGHT: 225.5 LBS | HEART RATE: 90 BPM | RESPIRATION RATE: 18 BRPM | OXYGEN SATURATION: 99 %

## 2021-07-23 DIAGNOSIS — R53.83 FATIGUE, UNSPECIFIED TYPE: ICD-10-CM

## 2021-07-23 DIAGNOSIS — E66.01 MORBID OBESITY (HCC): Primary | ICD-10-CM

## 2021-07-23 DIAGNOSIS — Z78.9 WEIGHT LOSS ADVISED: ICD-10-CM

## 2021-07-23 DIAGNOSIS — Z71.3 WEIGHT LOSS COUNSELING, ENCOUNTER FOR: ICD-10-CM

## 2021-07-23 PROCEDURE — 99212 OFFICE O/P EST SF 10 MIN: CPT | Performed by: NURSE PRACTITIONER

## 2021-07-23 RX ORDER — TERBINAFINE HYDROCHLORIDE 250 MG/1
250 TABLET ORAL DAILY
COMMUNITY
End: 2021-12-02

## 2021-07-23 NOTE — PROGRESS NOTES
Eder Aquino is a 48 y.o. female  Chief Complaint   Patient presents with    Weight Management     monthly follow up     [facility] Progress Note:   F/up Provider Visit      Eder Aquino is a 48 y.o. female who is here for her  f/up medical provider visit for the LCD Program. she did attend class last week. Starting mean planing with [facility] . Did you have any problems adhering to the program last week? no  If yes, please explain:     Since your last visit, have you experienced any complications? no    Have you received any other medical care this week? no  If yes, where and for what? Have you had any change in your medications since your last visit? no  If yes what? Are you taking an appetite suppressant? no   If yes:  Do you need a refill? BP Readings from Last 3 Encounters:   07/23/21 122/86   05/17/21 120/74   03/03/21 124/76        Eating Habits Over Last Week:  Did you take in 64 oz of non-caloric fluids? yes     Did you consume your prescribed meal replacement regimen each day? yes       Physical Activity Over the Past Week:    Aerobic exercise: 4 hours  Resistance exercise: no workouts / week      Weight History  Weight Metrics 7/23/2021 5/17/2021 5/17/2021 3/26/2021 3/3/2021 3/3/2021 3/3/2021   Weight 225 lb 8 oz - 217 lb 4.8 oz 213 lb - 210 lb 210 lb   Waist Measure Inches - 37 - - 36.5 - -   Exercise Mins/week - - - - - - -   Body Fat % - - - - - - -   BMI 42.61 kg/m2 - 41.06 kg/m2 40.25 kg/m2 - 39.68 kg/m2 39.68 kg/m2     Ideal body weight: 47.8 kg (105 lb 6.1 oz)  Adjusted ideal body weight: 69.6 kg (153 lb 6.8 oz)  Body mass index is 42.61 kg/m².       History    Past Medical History:   Diagnosis Date    Anxiety     Cancer ([facility]     kidney    Chronic kidney disease     Stage II    Depression     pt states anxiety schizoaffective ptsd    Eye twitch     Hypertension     Personal history of kidney cancer     Psychotic affective disorder ([facility] attention deficit disorder, and PTSD    Psychotic disorder ([facility]     Schizoaffective disorder       Past Surgical History:   Procedure Laterality Date    HX COLONOSCOPY      HX HYSTERECTOMY      HX NEPHRECTOMY Left     HX OOPHORECTOMY Right     HX OTHER SURGICAL      left nephrectomy       Current Outpatient Medications   Medication Sig Dispense Refill    terbinafine HCL (LAMISIL) 250 mg tablet Take 250 mg by mouth daily.  cyanocobalamin (Vitamin B-12) 1,000 mcg tablet Take 1,000 mcg by mouth daily.  Latuda 20 mg tab tablet TAKE 1 TABLET BY MOUTH EVERY DAY WITH FOOD      hydrOXYzine pamoate (VistariL) 25 mg capsule Take 25 mg by mouth three (3) times daily as needed.  irbesartan-hydroCHLOROthiazide (AVALIDE) 300-12.5 mg per tablet       traZODone (DESYREL) 50 mg tablet Take 75 mg by mouth nightly.  lidocaine (LIDODERM) 5 % APPLY 1 PATCHES DAILY TO AFFECTED AREAS AS NEEDED FOR PAIN. 12 HOURS ON 12 HOURS OFF      sertraline (ZOLOFT) 50 mg tablet 100 mg.      ciclopirox (LOPROX) 0.77 % topical cream APPLY TO AFFECTED AREA TWICE A DAY      ergocalciferol (ERGOCALCIFEROL) 1,250 mcg (50,000 unit) capsule TAKE 1 CAP BY MOUTH TWICE WEEKLY      Amitiza 24 mcg capsule Take  by mouth. Indications: as needed      magnesium hydroxide (MILK OF MAGNESIA) 2,400 mg/10 mL susp suspension Take 15 mL by mouth.  calcium-cholecalciferol, d3, (CALCIUM 600 + D) 600-125 mg-unit tab Take  by mouth.  albuterol (PROVENTIL HFA, VENTOLIN HFA, PROAIR HFA) 90 mcg/actuation inhaler 1-2 Puffs.  benzonatate (TESSALON) 100 mg capsule Take 1 capsule 3 times daily as needed for coughing, swallow capsules whole.  cetirizine (ZYRTEC) 10 mg tablet 10 mg.      ascorbic acid, vitamin C, (VITAMIN C) 500 mg tablet Take 500 mg by mouth daily.  multivitamin (ONE A DAY) tablet Take 1 Tab by mouth daily.  pyridoxine HCl, vitamin B6, (VITAMIN B-6 PO) Take  by mouth daily.       FIBER, PSYLLIUM HUSK, PO Take  by mouth daily.  Ascorbic Acid-Multivits-Min (EMERGEN-C) 1,000 mg pwep Take  by mouth daily.  magnesium 250 mg tab Take 400 mg by mouth daily.  zinc 50 mg tab tablet Take 50 mg by mouth daily.  butalbital-acetaminophen-caff (FIORICET) -40 mg per capsule Take 1-2 Caps by mouth every six (6) hours as needed for Pain. Max Daily Amount: 8 Caps. 20 Cap 0    simvastatin (ZOCOR) 10 mg tablet Take 20 mg by mouth nightly.  hydrochlorothiazide (HYDRODIURIL) 25 mg tablet Take 25 mg by mouth daily.  irbesartan (AVAPRO) 300 mg tablet TAKE 1 TABLET BY MOUTH EVERY DAY FOR BLOOD PRESSURE (Patient not taking: Reported on 2021)      tiZANidine (ZANAFLEX) 4 mg tablet TAKE 1 TABLET (4 MG) BY MOUTH EVERY 6 HOURS AS NEEDED NOT TO EXCEED 3 DOSES IN 24 HOURS (Patient not taking: Reported on 2021)      fish oil-omega-3 fatty acids 340-1,000 mg capsule Take 1 Cap by mouth daily. (Patient not taking: Reported on 2021)         Allergies   Allergen Reactions    Seafood Hives and Swelling    Iodine Other (comments)    Nsaids (Non-Steroidal Anti-Inflammatory Drug) Other (comments)    Shellfish Derived Angioedema       Social History     Tobacco Use    Smoking status: Former Smoker     Quit date: 2011     Years since quittin.6    Smokeless tobacco: Never Used   Vaping Use    Vaping Use: Never used   Substance Use Topics    Alcohol use: Yes    Drug use: No       No family history on file. No family status information on file. Medications:  Outpatient Medications Marked as Taking for the 21 encounter (Office Visit) with Arvind Meade NP   Medication Sig Dispense Refill    terbinafine HCL (LAMISIL) 250 mg tablet Take 250 mg by mouth daily.  cyanocobalamin (Vitamin B-12) 1,000 mcg tablet Take 1,000 mcg by mouth daily.       Latuda 20 mg tab tablet TAKE 1 TABLET BY MOUTH EVERY DAY WITH FOOD      hydrOXYzine pamoate (VistariL) 25 mg capsule Take 25 mg by mouth three (3) times daily as needed.  irbesartan-hydroCHLOROthiazide (AVALIDE) 300-12.5 mg per tablet       traZODone (DESYREL) 50 mg tablet Take 75 mg by mouth nightly.  lidocaine (LIDODERM) 5 % APPLY 1 PATCHES DAILY TO AFFECTED AREAS AS NEEDED FOR PAIN. 12 HOURS ON 12 HOURS OFF      sertraline (ZOLOFT) 50 mg tablet 100 mg.      ciclopirox (LOPROX) 0.77 % topical cream APPLY TO AFFECTED AREA TWICE A DAY      ergocalciferol (ERGOCALCIFEROL) 1,250 mcg (50,000 unit) capsule TAKE 1 CAP BY MOUTH TWICE WEEKLY      Amitiza 24 mcg capsule Take  by mouth. Indications: as needed      magnesium hydroxide (MILK OF MAGNESIA) 2,400 mg/10 mL susp suspension Take 15 mL by mouth.  calcium-cholecalciferol, d3, (CALCIUM 600 + D) 600-125 mg-unit tab Take  by mouth.  albuterol (PROVENTIL HFA, VENTOLIN HFA, PROAIR HFA) 90 mcg/actuation inhaler 1-2 Puffs.  benzonatate (TESSALON) 100 mg capsule Take 1 capsule 3 times daily as needed for coughing, swallow capsules whole.  cetirizine (ZYRTEC) 10 mg tablet 10 mg.      ascorbic acid, vitamin C, (VITAMIN C) 500 mg tablet Take 500 mg by mouth daily.  multivitamin (ONE A DAY) tablet Take 1 Tab by mouth daily.  pyridoxine HCl, vitamin B6, (VITAMIN B-6 PO) Take  by mouth daily.  FIBER, PSYLLIUM HUSK, PO Take  by mouth daily.  Ascorbic Acid-Multivits-Min (EMERGEN-C) 1,000 mg pwep Take  by mouth daily.  magnesium 250 mg tab Take 400 mg by mouth daily.  zinc 50 mg tab tablet Take 50 mg by mouth daily.  butalbital-acetaminophen-caff (FIORICET) -40 mg per capsule Take 1-2 Caps by mouth every six (6) hours as needed for Pain. Max Daily Amount: 8 Caps. 20 Cap 0    simvastatin (ZOCOR) 10 mg tablet Take 20 mg by mouth nightly.  hydrochlorothiazide (HYDRODIURIL) 25 mg tablet Take 25 mg by mouth daily. Review of Systems  Review of Systems   Constitutional: Positive for malaise/fatigue.    All other systems reviewed and are negative. Objective  Visit Vitals  /86   Pulse 90   Temp 98.1 °F (36.7 °C) (Temporal)   Resp 18   Ht 5' 1\" (1.549 m)   Wt 102.3 kg (225 lb 8 oz)   SpO2 99%   BMI 42.61 kg/m²     No LMP recorded. Patient has had a hysterectomy. Physical Exam  Physical Exam  Vitals and nursing note reviewed. HENT:      Head: Normocephalic and atraumatic. Pulmonary:      Effort: Pulmonary effort is normal.   Musculoskeletal:      Cervical back: Normal range of motion. Neurological:      Mental Status: She is alert and oriented to person, place, and time. Psychiatric:         Mood and Affect: Mood normal.         Behavior: Behavior normal.         Recent Results (from the past 672 hour(s))   METABOLIC PANEL, COMPREHENSIVE    Collection Time: 07/22/21  4:02 PM   Result Value Ref Range    Glucose 86 70 - 99 mg/dL    BUN 13 6 - 22 mg/dL    Creatinine 0.8 0.5 - 1.2 mg/dL    Sodium 134 133 - 145 mmol/L    Potassium 3.5 3.5 - 5.5 mmol/L    Chloride 98 98 - 110 mmol/L    CO2 29 20 - 32 mmol/L    AST (SGOT) 17 10 - 37 U/L    ALT (SGPT) 19 5 - 40 U/L    Alk. phosphatase 72 25 - 115 U/L    Bilirubin, total 0.6 0.2 - 1.2 mg/dL    Calcium 9.3 8.4 - 10.5 mg/dL    Protein, total 6.9 6.4 - 8.3 g/dL    Albumin 4.2 3.5 - 5.0 g/dL    A-G Ratio 1.6 1.1 - 2.6 ratio    Globulin 2.7 2.0 - 4.0 g/dL    Anion gap 7.0 3.0 - 15.0 mmol/L    GFRAA >60.0 >60.0    GFRNA >60.0 >60.0   FERRITIN    Collection Time: 07/22/21  4:02 PM   Result Value Ref Range    Ferritin 177 10 - 291 ng/mL   IRON    Collection Time: 07/22/21  4:02 PM   Result Value Ref Range    Iron 94 30 - 160 mcg/dL   TSH 3RD GENERATION    Collection Time: 07/22/21  4:02 PM   Result Value Ref Range    TSH 0.57 0.27 - 4.20 mcU/mL   VITAMIN D, 25 HYDROXY    Collection Time: 07/22/21  4:02 PM   Result Value Ref Range    VITAMIN D, 25-HYDROXY 49.9 32.0 - 100.0 ng/mL       Assessment / Plan    Encounter Diagnoses   Name Primary?     Morbid obesity ([facility] Yes    BMI 40.0-44.9, adult ([facility])     Fatigue, unspecified type     Weight loss counseling, encounter for     Weight loss advised      1. Weight management   Today, the patient is  Height: 5' 1\" (154.9 cm) tall, Weight: 102.3 kg (225 lb 8 oz) lbs for a Body mass index is 42.61 kg/m². is suffering from morbid obesity    Degree of control: patient will continue [place]'s recommendations for 1 month and we will reassess, requests monthly follow up at this time, LCD   Progress was reviewed with patient. Goal(s) for next appointment:   -10lbs     I have reviewed/discussed the above normal BMI with the patient. I have recommended the following interventions: dietary management education, guidance, and counseling, encourage exercise, monitor weight and prescribed dietary intake . SEAN Brock     Dragon medical dictation software was used for portions of this report. Unintended transcription errors may occur.

## 2021-07-23 NOTE — PROGRESS NOTES
NUTRITION  FOLLOW-UP TREATMENT NOTE  Patient Name: Sarah Walsh         Date: 2021  : 1975    YES/NO Patient  Verified  Diagnosis: Obesity   In time:   2:30   pm         Out time:   3:00 pm   Total Treatment Time (min):   30     SUBJECTIVE/ASSESSMENT:  Patient visits discouraged that she has not lost weight. She believes she has water retention which I believe might be the case upon appearance. RD recommended that she make an appointment with her PCP to address her medicines and the water retention. Patient was able to make that appointment while in my office. Changes in medication or medical history? Any new allergies, surgeries or procedures? YES/NO    If yes, update Summary List   None reported        Current Wt: 225# Previous Wt: 223# Wt Change: 2#     Achievement of Goals: 1. Non reported although patient is now in a new living situation which may be more beneficial for her eventually. Patient Education:  [x]  Review current plan with patient   [x]  Other:    Handouts/  Information Provided: []  Carbohydrates  []  Protein  []  Fiber  []  Serving Sizes  []  Fluids  [x]  General guidelines []  Diabetes  []  Cholesterol  [x]  Sodium  []  SBGM  []  Food Journals  []  Others:      New Patient Goals: 1. Rinse canned vegetables to decrease sodium. 2.  Patient to try 647 bread. 3.  > 64 ounces of water daily. 4.  Patient made an appointment while in my office with her PCP to address her medications and the water retention we both observed.       PLAN    [x]  Continue on current plan []  Follow-up PRN   []  Discharge due to :    [x]  Next appt: 2021   2:30 pm     Dietitian: Jose Morejon RD    Date: 2021

## 2021-08-23 ENCOUNTER — VIRTUAL VISIT (OUTPATIENT)
Dept: SURGERY | Age: 46
End: 2021-08-23
Payer: MEDICAID

## 2021-08-23 VITALS — WEIGHT: 222 LBS | HEIGHT: 61 IN | BODY MASS INDEX: 41.91 KG/M2

## 2021-08-23 DIAGNOSIS — Z71.3 WEIGHT LOSS COUNSELING, ENCOUNTER FOR: ICD-10-CM

## 2021-08-23 DIAGNOSIS — E66.01 MORBID OBESITY (HCC): Primary | ICD-10-CM

## 2021-08-23 PROCEDURE — 99214 OFFICE O/P EST MOD 30 MIN: CPT | Performed by: NURSE PRACTITIONER

## 2021-08-23 RX ORDER — FUROSEMIDE 20 MG/1
20 TABLET ORAL
COMMUNITY
Start: 2021-07-29 | End: 2021-12-22

## 2021-08-23 NOTE — PROGRESS NOTES
Consent:  Eli Leon  and/or her healthcare decision maker is aware that this patient-initiated Telehealth encounter is a billable service, with coverage as determined by her insurance carrier. She is aware that she may receive a bill and has provided verbal consent to proceed: Yes    Services were provided through a video synchronous discussion virtually to substitute for in-person clinic visit. Pursuant to the emergency declaration under the [facility], [facility] waiver authority and the [employer] Act, this Virtual  Visit was conducted, with patient's consent, to reduce the patient's risk of exposure to COVID-19 and provide continuity of care for an established patient. Provider location while conducting visit: in the office   Patient location: Home  Other person participating in the telehealth services: Maria C Bergman    This virtual visit was conducted via interactive/real-time audio/video using [employer]. Me MyChart  Visit start: 1330  Visit end: [facility] Weight Loss Program Progress Note:   F/up Provider Visit      Eli Leon is a 48 y.o. female who is here for her  f/up medical provider visit for the LCD Program. she did attend class last week. Continues with meal planning with FLAKITA Deutsch keeping to LCD.     -3lbs since our last visit     Recently changed to lasix, thinks this is helping some but notes the heat or Gatorades might be negatively impacting this. She is eating high protein healthy choice meals but is concerned about the higher sodium content so she will discuss this with her RD if there are lower sodium options. Did you have any problems adhering to the program last week? no  If yes, please explain:     Since your last visit, have you experienced any complications? no    Have you received any other medical care this week? yes  If yes, where and for what?  \"went to ER for Acid Reflux\"    Have you had any change in your medications since your last visit? yes  If yes what? \"furosemide\"    Are you taking an appetite suppressant? no   If yes:  Do you need a refill? BP Readings from Last 3 Encounters:   21 122/86   21 120/74   21 124/76        Eating Habits Over Last Week:  Did you take in 64 oz of non-caloric fluids? yes     Did you consume your prescribed meal replacement regimen each day? yes       Physical Activity Over the Past Week:    Aerobic exercise: 240 min  Resistance exercise: no workouts / week      Weight History  Weight Metrics 2021 2021 2021 2021 3/26/2021 3/3/2021 3/3/2021   Weight 222 lb 225 lb 8 oz - 217 lb 4.8 oz 213 lb - 210 lb   Waist Measure Inches - - 37 - - 36.5 -   Exercise Mins/week - - - - - - -   Body Fat % - - - - - - -   BMI 41.95 kg/m2 42.61 kg/m2 - 41.06 kg/m2 40.25 kg/m2 - 39.68 kg/m2     Ideal body weight: 47.8 kg (105 lb 6.1 oz)  Adjusted ideal body weight: 69 kg (152 lb 0.5 oz)  Body mass index is 41.95 kg/m².       History    Past Medical History:   Diagnosis Date    Anxiety     Cancer ([facility]     kidney    Chronic kidney disease     Stage II    Depression     pt states anxiety schizoaffective ptsd    Eye twitch     Hypertension     Personal history of kidney cancer     Psychotic affective disorder ([facility]     attention deficit disorder, and PTSD    Psychotic disorder ([facility]     Schizoaffective disorder       Past Surgical History:   Procedure Laterality Date    HX COLONOSCOPY      HX HYSTERECTOMY      HX NEPHRECTOMY Left     HX OOPHORECTOMY Right     HX OTHER SURGICAL      left nephrectomy       Allergies   Allergen Reactions    Seafood Hives and Swelling    Iodine Other (comments)    Nsaids (Non-Steroidal Anti-Inflammatory Drug) Other (comments)    Shellfish Derived Angioedema       Social History     Tobacco Use    Smoking status: Former Smoker     Quit date: 2011     Years since quittin.7    Smokeless tobacco: Never Used   Vaping Use    Vaping Use: Never used   Substance Use Topics    Alcohol use: Not Currently    Drug use: No       No family history on file. No family status information on file. Medications:  Outpatient Medications Marked as Taking for the 8/23/21 encounter (Virtual Visit) with Ellen Nguyen NP   Medication Sig Dispense Refill    furosemide (LASIX) 20 mg tablet Take 20 mg by mouth.  terbinafine HCL (LAMISIL) 250 mg tablet Take 250 mg by mouth daily.  cyanocobalamin (Vitamin B-12) 1,000 mcg tablet Take 1,000 mcg by mouth daily.  Latuda 20 mg tab tablet TAKE 1 TABLET BY MOUTH EVERY DAY WITH FOOD      hydrOXYzine pamoate (VistariL) 25 mg capsule Take 25 mg by mouth three (3) times daily as needed.  traZODone (DESYREL) 50 mg tablet Take 75 mg by mouth nightly.  lidocaine (LIDODERM) 5 % APPLY 1 PATCHES DAILY TO AFFECTED AREAS AS NEEDED FOR PAIN. 12 HOURS ON 12 HOURS OFF      sertraline (ZOLOFT) 50 mg tablet 100 mg.      irbesartan (AVAPRO) 300 mg tablet TAKE 1 TABLET BY MOUTH EVERY DAY FOR BLOOD PRESSURE      ciclopirox (LOPROX) 0.77 % topical cream APPLY TO AFFECTED AREA TWICE A DAY      ergocalciferol (ERGOCALCIFEROL) 1,250 mcg (50,000 unit) capsule TAKE 1 CAP BY MOUTH TWICE WEEKLY      Amitiza 24 mcg capsule Take  by mouth. Indications: as needed      magnesium hydroxide (MILK OF MAGNESIA) 2,400 mg/10 mL susp suspension Take 15 mL by mouth.  calcium-cholecalciferol, d3, (CALCIUM 600 + D) 600-125 mg-unit tab Take  by mouth.  albuterol (PROVENTIL HFA, VENTOLIN HFA, PROAIR HFA) 90 mcg/actuation inhaler 1-2 Puffs.  benzonatate (TESSALON) 100 mg capsule Take 1 capsule 3 times daily as needed for coughing, swallow capsules whole.  cetirizine (ZYRTEC) 10 mg tablet 10 mg.      ascorbic acid, vitamin C, (VITAMIN C) 500 mg tablet Take 500 mg by mouth daily.  multivitamin (ONE A DAY) tablet Take 1 Tab by mouth daily.       pyridoxine HCl, vitamin B6, (VITAMIN B-6 PO) Take  by mouth daily.  fish oil-omega-3 fatty acids 340-1,000 mg capsule Take 1 Capsule by mouth daily.  FIBER, PSYLLIUM HUSK, PO Take  by mouth daily.  magnesium 250 mg tab Take 400 mg by mouth daily.  zinc 50 mg tab tablet Take 50 mg by mouth daily.  butalbital-acetaminophen-caff (FIORICET) -40 mg per capsule Take 1-2 Caps by mouth every six (6) hours as needed for Pain. Max Daily Amount: 8 Caps. 20 Cap 0    simvastatin (ZOCOR) 10 mg tablet Take 20 mg by mouth nightly.  hydrochlorothiazide (HYDRODIURIL) 25 mg tablet Take 25 mg by mouth daily. Review of Systems  Review of Systems   Constitutional: Positive for weight loss. All other systems reviewed and are negative. Objective  Visit Vitals  Ht 5' 1\" (1.549 m)   Wt 100.7 kg (222 lb)   BMI 41.95 kg/m²     No LMP recorded. Patient has had a hysterectomy. Physical Exam  Physical Exam  Vitals and nursing note reviewed. HENT:      Head: Normocephalic and atraumatic. Pulmonary:      Effort: Pulmonary effort is normal.   Musculoskeletal:      Cervical back: Normal range of motion. Neurological:      Mental Status: She is alert and oriented to person, place, and time. Psychiatric:         Mood and Affect: Mood normal.         Behavior: Behavior normal.       No results found for this or any previous visit (from the past 672 hour(s)). Assessment / Plan    Encounter Diagnoses   Name Primary?  Morbid obesity ([facility] Yes    BMI 40.0-44.9, adult ([facility]     Weight loss counseling, encounter for        1. Weight management   Today, the patient is  Height: 5' 1\" (154.9 cm) tall, Weight: 100.7 kg (222 lb) lbs for a Body mass index is 41.95 kg/m². Degree of control: -3lbs, struggling with swelling -- has addressed this with MD, now focusing on reducing sodium, will continue with Elena Segura RD and a LCD    Progress was reviewed with patient.     Goal(s) for next appointment:   -5lbs     I have reviewed/discussed the above normal BMI with the patient. I have recommended the following interventions: dietary management education, guidance, and counseling, encourage exercise, monitor weight and prescribed dietary intake . Hailee Medley, [facility]     Dragon medical dictation software was used for portions of this report. Unintended transcription errors may occur.

## 2021-08-24 ENCOUNTER — HOSPITAL ENCOUNTER (OUTPATIENT)
Dept: NUTRITION | Age: 46
End: 2021-08-24
Payer: MEDICAID

## 2021-09-08 ENCOUNTER — HOSPITAL ENCOUNTER (OUTPATIENT)
Dept: NUTRITION | Age: 46
Discharge: HOME | End: 2021-09-08
Payer: MEDICAID

## 2021-09-08 PROCEDURE — 97803 MED NUTRITION INDIV SUBSEQ: CPT

## 2021-09-10 NOTE — PROGRESS NOTES
NUTRITION  FOLLOW-UP TREATMENT NOTE  Patient Name: Channing Emerson         Date: 2021  : 1975    YES/NO Patient  Verified  Diagnosis: Obesity   In time:   3:00  pm          Out time:   3:30  pm   Total Treatment Time (min):   30     SUBJECTIVE/ASSESSMENT:  Patient has done well over the month but shares that she would prefer another place to live eventually. Changes in medication or medical history? Any new allergies, surgeries or procedures? YES/NO    If yes, update Summary List   None reported       Current Wt: 222# Previous Wt: 225# Wt Change: 3#     Achievement of Goals: 1. Decreased 3# from last month. Patient Education:  [x]  Review current plan with patient   []  Other:    Handouts/  Information Provided: []  Carbohydrates  []  Protein  []  Fiber  []  Serving Sizes  []  Fluids  []  General guidelines []  Diabetes  []  Cholesterol  []  Sodium  []  SBGM  []  Food Journals  []  Others:      New Patient Goals: 1.  > 64 ounces of water daily.        PLAN    [x]  Continue on current plan []  Follow-up PRN   []  Discharge due to :    [x]  Next appt: 10/13/2021  2:00 pm     Dietitian: Bert Wolfe RD    Date: 2021

## 2021-10-06 ENCOUNTER — OFFICE VISIT (OUTPATIENT)
Dept: SURGERY | Age: 46
End: 2021-10-06
Payer: MEDICAID

## 2021-10-06 VITALS
HEIGHT: 61 IN | HEART RATE: 90 BPM | SYSTOLIC BLOOD PRESSURE: 165 MMHG | BODY MASS INDEX: 42.67 KG/M2 | WEIGHT: 226 LBS | DIASTOLIC BLOOD PRESSURE: 92 MMHG | TEMPERATURE: 97.9 F | RESPIRATION RATE: 20 BRPM

## 2021-10-06 DIAGNOSIS — Z71.3 ENCOUNTER FOR WEIGHT LOSS COUNSELING: ICD-10-CM

## 2021-10-06 DIAGNOSIS — E66.01 MORBID OBESITY (HCC): ICD-10-CM

## 2021-10-06 DIAGNOSIS — Z71.3 ENCOUNTER FOR DIETARY COUNSELING AND SURVEILLANCE: Primary | ICD-10-CM

## 2021-10-06 PROCEDURE — 99213 OFFICE O/P EST LOW 20 MIN: CPT | Performed by: NURSE PRACTITIONER

## 2021-10-06 NOTE — PROGRESS NOTES
[facility] Progress Note:   F/up Provider Visit    CC: Weight Management    Megan Mustafa is a 48 y.o. female who is here for her  f/up medical provider visit for the LCD Program. she did attend class last week. +4 lbs  2 MR for snack and dinner. Eating breakfast and lunch. Oatmeal for breakfast and tuna/chicken salad sandwich for breakfast. Trying to work on cutting down sodium. Per pt she was -5 lbs but had death in the family and has been emotional eating. Working with Ade Galarza, [address] on diet, next follow up with her 10/13. Is wondering if she can get her cortisol level/adrenal function tested due to hx of left nephrectomy/adrenalectomy. Did you have any problems adhering to the program last week? no  If yes, please explain:     Since your last visit, have you experienced any complications? no    Have you received any other medical care this week? no  If yes, where and for what? Have you had any change in your medications since your last visit? no  If yes what? Are you taking an appetite suppressant? no   If yes:  Do you need a refill? BP Readings from Last 3 Encounters:   10/06/21 (!) 165/92   07/23/21 122/86   05/17/21 120/74        Eating Habits Over Last Week:  Did you take in 64 oz of non-caloric fluids? yes     Did you consume your prescribed meal replacement regimen each day?  yes       Physical Activity Over the Past Week:    Aerobic exercise: 2 hours walking daily  Resistance exercise: 0 workouts / week      Weight History  Weight Metrics 10/6/2021 8/23/2021 7/23/2021 5/17/2021 5/17/2021 3/26/2021 3/3/2021   Weight 226 lb 222 lb 225 lb 8 oz - 217 lb 4.8 oz 213 lb -   Waist Measure Inches - - - 37 - - 36.5   Exercise Mins/week - - - - - - -   Body Fat % - - - - - - -   BMI 42.7 kg/m2 41.95 kg/m2 42.61 kg/m2 - 41.06 kg/m2 40.25 kg/m2 -         Ideal body weight: 47.8 kg (105 lb 6.1 oz)  Adjusted ideal body weight: 69.7 kg (153 lb 10 oz)  Body mass index is 42.7 kg/m². History    Past Medical History:   Diagnosis Date    Anxiety     Cancer ([facility]     kidney    Chronic kidney disease     Stage II    Depression     pt states anxiety schizoaffective ptsd    Eye twitch     Hypertension     Personal history of kidney cancer     [facility]     attention deficit disorder, and PTSD    Psychotic disorder ([facility]     Schizoaffective disorder       Past Surgical History:   Procedure Laterality Date    HX COLONOSCOPY      HX HYSTERECTOMY      HX NEPHRECTOMY Left     HX OOPHORECTOMY Right     HX OTHER SURGICAL      left nephrectomy       Current Outpatient Medications   Medication Sig Dispense Refill    furosemide (LASIX) 20 mg tablet Take 20 mg by mouth.  terbinafine HCL (LAMISIL) 250 mg tablet Take 250 mg by mouth daily.  cyanocobalamin (Vitamin B-12) 1,000 mcg tablet Take 1,000 mcg by mouth daily.  Latuda 20 mg tab tablet TAKE 1 TABLET BY MOUTH EVERY DAY WITH FOOD      hydrOXYzine pamoate (VistariL) 25 mg capsule Take 25 mg by mouth three (3) times daily as needed.  irbesartan-hydroCHLOROthiazide (AVALIDE) 300-12.5 mg per tablet       traZODone (DESYREL) 50 mg tablet Take 75 mg by mouth nightly.  lidocaine (LIDODERM) 5 % APPLY 1 PATCHES DAILY TO AFFECTED AREAS AS NEEDED FOR PAIN. 12 HOURS ON 12 HOURS OFF      sertraline (ZOLOFT) 50 mg tablet 100 mg.      irbesartan (AVAPRO) 300 mg tablet TAKE 1 TABLET BY MOUTH EVERY DAY FOR BLOOD PRESSURE      ciclopirox (LOPROX) 0.77 % topical cream APPLY TO AFFECTED AREA TWICE A DAY      tiZANidine (ZANAFLEX) 4 mg tablet TAKE 1 TABLET (4 MG) BY MOUTH EVERY 6 HOURS AS NEEDED NOT TO EXCEED 3 DOSES IN 24 HOURS      ergocalciferol (ERGOCALCIFEROL) 1,250 mcg (50,000 unit) capsule TAKE 1 CAP BY MOUTH TWICE WEEKLY      Amitiza 24 mcg capsule Take  by mouth. Indications: as needed      calcium-cholecalciferol, d3, (CALCIUM 600 + D) 600-125 mg-unit tab Take  by mouth.       albuterol (PROVENTIL HFA, VENTOLIN HFA, PROAIR HFA) 90 mcg/actuation inhaler 1-2 Puffs.  benzonatate (TESSALON) 100 mg capsule Take 1 capsule 3 times daily as needed for coughing, swallow capsules whole.  cetirizine (ZYRTEC) 10 mg tablet 10 mg.      ascorbic acid, vitamin C, (VITAMIN C) 500 mg tablet Take 500 mg by mouth daily.  multivitamin (ONE A DAY) tablet Take 1 Tab by mouth daily.  pyridoxine HCl, vitamin B6, (VITAMIN B-6 PO) Take  by mouth daily.  fish oil-omega-3 fatty acids 340-1,000 mg capsule Take 1 Capsule by mouth daily.  FIBER, PSYLLIUM HUSK, PO Take  by mouth daily.  Ascorbic Acid-Multivits-Min (EMERGEN-C) 1,000 mg pwep Take  by mouth daily.  magnesium 250 mg tab Take 400 mg by mouth daily.  zinc 50 mg tab tablet Take 50 mg by mouth daily.  butalbital-acetaminophen-caff (FIORICET) -40 mg per capsule Take 1-2 Caps by mouth every six (6) hours as needed for Pain. Max Daily Amount: 8 Caps. 20 Cap 0    simvastatin (ZOCOR) 10 mg tablet Take 20 mg by mouth nightly.  hydrochlorothiazide (HYDRODIURIL) 25 mg tablet Take 25 mg by mouth daily.  magnesium hydroxide (MILK OF MAGNESIA) 2,400 mg/10 mL susp suspension Take 15 mL by mouth. Allergies   Allergen Reactions    Seafood Hives and Swelling    Iodine Other (comments)    Nsaids (Non-Steroidal Anti-Inflammatory Drug) Other (comments)    Shellfish Derived Angioedema       Social History     Tobacco Use    Smoking status: Former Smoker     Quit date: 2011     Years since quittin.8    Smokeless tobacco: Never Used   Vaping Use    Vaping Use: Never used   Substance Use Topics    Alcohol use: Not Currently    Drug use: No       History reviewed. No pertinent family history. No family status information on file.        Medications:  Outpatient Medications Marked as Taking for the 10/6/21 encounter (Office Visit) with Buddy Hodges NP   Medication Sig Dispense Refill    furosemide (LASIX) 20 mg tablet Take 20 mg by mouth.  terbinafine HCL (LAMISIL) 250 mg tablet Take 250 mg by mouth daily.  cyanocobalamin (Vitamin B-12) 1,000 mcg tablet Take 1,000 mcg by mouth daily.  Latuda 20 mg tab tablet TAKE 1 TABLET BY MOUTH EVERY DAY WITH FOOD      hydrOXYzine pamoate (VistariL) 25 mg capsule Take 25 mg by mouth three (3) times daily as needed.  irbesartan-hydroCHLOROthiazide (AVALIDE) 300-12.5 mg per tablet       traZODone (DESYREL) 50 mg tablet Take 75 mg by mouth nightly.  lidocaine (LIDODERM) 5 % APPLY 1 PATCHES DAILY TO AFFECTED AREAS AS NEEDED FOR PAIN. 12 HOURS ON 12 HOURS OFF      sertraline (ZOLOFT) 50 mg tablet 100 mg.      irbesartan (AVAPRO) 300 mg tablet TAKE 1 TABLET BY MOUTH EVERY DAY FOR BLOOD PRESSURE      ciclopirox (LOPROX) 0.77 % topical cream APPLY TO AFFECTED AREA TWICE A DAY      tiZANidine (ZANAFLEX) 4 mg tablet TAKE 1 TABLET (4 MG) BY MOUTH EVERY 6 HOURS AS NEEDED NOT TO EXCEED 3 DOSES IN 24 HOURS      ergocalciferol (ERGOCALCIFEROL) 1,250 mcg (50,000 unit) capsule TAKE 1 CAP BY MOUTH TWICE WEEKLY      Amitiza 24 mcg capsule Take  by mouth. Indications: as needed      calcium-cholecalciferol, d3, (CALCIUM 600 + D) 600-125 mg-unit tab Take  by mouth.  albuterol (PROVENTIL HFA, VENTOLIN HFA, PROAIR HFA) 90 mcg/actuation inhaler 1-2 Puffs.  benzonatate (TESSALON) 100 mg capsule Take 1 capsule 3 times daily as needed for coughing, swallow capsules whole.  cetirizine (ZYRTEC) 10 mg tablet 10 mg.      ascorbic acid, vitamin C, (VITAMIN C) 500 mg tablet Take 500 mg by mouth daily.  multivitamin (ONE A DAY) tablet Take 1 Tab by mouth daily.  pyridoxine HCl, vitamin B6, (VITAMIN B-6 PO) Take  by mouth daily.  fish oil-omega-3 fatty acids 340-1,000 mg capsule Take 1 Capsule by mouth daily.  FIBER, PSYLLIUM HUSK, PO Take  by mouth daily.       Ascorbic Acid-Multivits-Min (EMERGEN-C) 1,000 mg pwep Take  by mouth daily.  magnesium 250 mg tab Take 400 mg by mouth daily.  zinc 50 mg tab tablet Take 50 mg by mouth daily.  butalbital-acetaminophen-caff (FIORICET) -40 mg per capsule Take 1-2 Caps by mouth every six (6) hours as needed for Pain. Max Daily Amount: 8 Caps. 20 Cap 0    simvastatin (ZOCOR) 10 mg tablet Take 20 mg by mouth nightly.  hydrochlorothiazide (HYDRODIURIL) 25 mg tablet Take 25 mg by mouth daily. Review of Systems  Review of Systems   Constitutional: Positive for malaise/fatigue and weight loss. Eyes: Negative. Respiratory: Negative. Cardiovascular: Negative for chest pain, palpitations and leg swelling. Gastrointestinal: Negative. Genitourinary: Negative. Musculoskeletal: Negative. Skin: Negative. Neurological: Negative. Objective  Visit Vitals  BP (!) 165/92 (BP 1 Location: Left upper arm, BP Patient Position: At rest, BP Cuff Size: Adult)   Pulse 90   Temp 97.9 °F (36.6 °C) (Oral)   Resp 20   Ht 5' 1\" (1.549 m)   Wt 102.5 kg (226 lb)   BMI 42.70 kg/m²     No LMP recorded. Patient has had a hysterectomy. Physical Exam  Physical Exam  Constitutional:       Appearance: She is obese. HENT:      Head: Normocephalic and atraumatic. Cardiovascular:      Rate and Rhythm: Normal rate and regular rhythm. Pulses: Normal pulses. Heart sounds: Normal heart sounds. Pulmonary:      Effort: Pulmonary effort is normal.      Breath sounds: Normal breath sounds. Musculoskeletal:         General: Normal range of motion. Skin:     General: Skin is warm and dry. Neurological:      General: No focal deficit present. Mental Status: She is alert and oriented to person, place, and time. Psychiatric:         Mood and Affect: Mood normal.         Behavior: Behavior normal.           No results found for this or any previous visit (from the past 672 hour(s)). Assessment / Plan    Encounter Diagnoses   Name Primary?     Encounter for dietary counseling and surveillance Yes    Encounter for weight loss counseling     Morbid obesity ([facility]     BMI 40.0-44.9, adult ([facility]        1. Weight management      Today, the patient is  Height: 5' 1\" (154.9 cm) tall, Weight: 102.5 kg (226 lb) lbs for a Body mass index is 42.7 kg/m². is suffering from morbid obesity      Degree of control: fair. Recommended to decrease carbs. Discussed lower sodium, high protein options, such as greek yogurt. Progress was reviewed with patient. Goal(s) for next appointment:   -3 lbs  Discussed with pt to speak with her PCP or nephrologist regarding adrenal function testing. I have reviewed/discussed the above normal BMI with the patient. I have recommended the following interventions: dietary management education, guidance, and counseling, encourage exercise, monitor weight and prescribed dietary intake . Charlette Betancur The primary encounter diagnosis was Encounter for dietary counseling and surveillance. Diagnoses of Encounter for weight loss counseling, Morbid obesity ([facility] and BMI 40.0-44.9, adult [facility]) were also pertinent to this visit. Follow-up and Dispositions    · Return in about 4 weeks (around 11/3/2021). A total time of 25 minutes was spent face-to-face with the patient during this encounter and over half of that time was spent on counseling.       EDWARD Jama

## 2021-10-06 NOTE — PATIENT INSTRUCTIONS
Breakfast:  Clora Mitchell  Egg whites    Lunch:  Lettuce wrap     If you are having fruit, try to have berries, no more than one cup  If you have an apple, have a half apple with a teaspoon of peanut butter

## 2021-10-13 ENCOUNTER — HOSPITAL ENCOUNTER (OUTPATIENT)
Dept: NUTRITION | Age: 46
Discharge: HOME | End: 2021-10-13
Payer: MEDICAID

## 2021-10-13 PROCEDURE — 97803 MED NUTRITION INDIV SUBSEQ: CPT

## 2021-10-14 NOTE — PROGRESS NOTES
NUTRITION  FOLLOW-UP TREATMENT NOTE  Patient Name: Morena Ricketts         Date: 10/13/2021  : 1975    YES/NO Patient  Verified  Diagnosis: Obesity      Total Treatment Time (min):   30     SUBJECTIVE/ASSESSMENT:  Patient is doing well. Changes in medication or medical history? Any new allergies, surgeries or procedures? YES/NO    If yes, update Summary List   None reported        Current Wt: 225# Previous Wt: 225# Wt Change: 0#     Achievement of Goals: 1. Patient is following an eating schedule. Patient Education:  [x]  Review current plan with patient   []  Other:    Handouts/  Information Provided: []  Carbohydrates  []  Protein  []  Fiber  []  Serving Sizes  []  Fluids  []  General guidelines []  Diabetes  []  Cholesterol  []  Sodium  []  SBGM  []  Food Journals  []  Others:      New Patient Goals: 1. To follow meal plan.      PLAN    [x]  Continue on current plan []  Follow-up PRN   []  Discharge due to :    [x]  Next appt:  at 2:30 pm        Dietitian: Fouzia Michael RD    Date: 10/13/2021 Bilobed Transposition Flap Text: The defect edges were debeveled with a #15 scalpel blade.  Given the location of the defect and the proximity to free margins a bilobed transposition flap was deemed most appropriate.  Using a sterile surgical marker, an appropriate bilobe flap drawn around the defect.    The area thus outlined was incised deep to adipose tissue with a #15 scalpel blade.  The skin margins were undermined to an appropriate distance in all directions utilizing iris scissors.

## 2021-11-04 ENCOUNTER — HOSPITAL ENCOUNTER (OUTPATIENT)
Dept: PREADMISSION TESTING | Age: 46
Discharge: HOME | End: 2021-11-04
Payer: MEDICAID

## 2021-11-04 ENCOUNTER — TRANSCRIBE ORDER (OUTPATIENT)
Dept: REGISTRATION | Age: 46
End: 2021-11-04

## 2021-11-04 DIAGNOSIS — Z01.818 OTHER SPECIFIED PRE-OPERATIVE EXAMINATION: ICD-10-CM

## 2021-11-04 DIAGNOSIS — Z01.818 OTHER SPECIFIED PRE-OPERATIVE EXAMINATION: Primary | ICD-10-CM

## 2021-11-04 PROCEDURE — U0003 INFECTIOUS AGENT DETECTION BY NUCLEIC ACID (DNA OR RNA); SEVERE ACUTE RESPIRATORY SYNDROME CORONAVIRUS 2 (SARS-COV-2) (CORONAVIRUS DISEASE [COVID-19]), AMPLIFIED PROBE TECHNIQUE, MAKING USE OF HIGH THROUGHPUT TECHNOLOGIES AS DESCRIBED BY CMS-2020-01-R: HCPCS

## 2021-11-05 LAB — SARS-COV-2, NAA: NOT DETECTED

## 2021-11-05 NOTE — H&P
[email or web address]  [phone]    GASTROENTEROLOGY Pre-Procedure H and P      Impression/Plan:   1. This patient is consented for a colonoscopy for h/o polyps. Chief Complaint: h/o polyps. HPI:  Jackson Rico is a 48 y.o. female who presents for a colonoscopy for h/o polyps. PMH:   Past Medical History:   Diagnosis Date    Anxiety     Cancer ([facility] [address]     kidney    Chronic kidney disease     Stage II    Depression     pt states anxiety schizoaffective ptsd    Eye twitch     Hypertension     Personal history of kidney cancer     Psychotic affective disorder (HCC)     attention deficit disorder, and PTSD    Psychotic disorder (HCC)     Schizoaffective disorder       PSH:   Past Surgical History:   Procedure Laterality Date    HX COLONOSCOPY      HX HYSTERECTOMY      HX NEPHRECTOMY Left     HX OOPHORECTOMY Right     HX OTHER SURGICAL      left nephrectomy       Social HX:   Social History     Socioeconomic History    Marital status: SINGLE     Spouse name: Not on file    Number of children: Not on file    Years of education: Not on file    Highest education level: Not on file   Occupational History    Not on file   Tobacco Use    Smoking status: Former Smoker     Quit date: 2011     Years since quittin.9    Smokeless tobacco: Never Used   Vaping Use    Vaping Use: Never used   Substance and Sexual Activity    Alcohol use: Not Currently    Drug use: No    Sexual activity: Not on file   Other Topics Concern    Not on file   Social History Narrative    ** Merged History Encounter **          Social Determinants of Health     Financial Resource Strain:     Difficulty of Paying Living Expenses: Not on file   Food Insecurity:     Worried About [address] in the Last Year: Not on file    Jamila of Food in the Last Year: Not on file   Transportation Needs:     Lack of Transportation (Medical): Not on file    Lack of Transportation (Non-Medical):  Not on file Physical Activity:     Days of Exercise per Week: Not on file    Minutes of Exercise per Session: Not on file   Stress:     Feeling of Stress : Not on file   Social Connections:     Frequency of Communication with Friends and Family: Not on file    Frequency of Social Gatherings with Friends and Family: Not on file    Attends [employer] Services: Not on file    Active Member of [address] [place] [address] or Organizations: Not on file    Attends Club or Organization Meetings: Not on file    Marital Status: Not on file   Intimate Partner Violence:     Fear of Current or Ex-Partner: Not on file    Emotionally Abused: Not on file    Physically Abused: Not on file    Sexually Abused: Not on file   Housing Stability:     Unable to Pay for Housing in the Last Year: Not on file    Number of [place] in the Last Year: Not on file    Unstable Housing in the Last Year: Not on file       FHX:   No family history on file. Allergy:   Allergies   Allergen Reactions    Seafood Hives and Swelling    Iodine Other (comments)    Nsaids (Non-Steroidal Anti-Inflammatory Drug) Other (comments)    Shellfish Derived Angioedema       Home Medications:     Medications Prior to Admission   Medication Sig    furosemide (LASIX) 20 mg tablet Take 20 mg by mouth.  terbinafine HCL (LAMISIL) 250 mg tablet Take 250 mg by mouth daily.  cyanocobalamin (Vitamin B-12) 1,000 mcg tablet Take 1,000 mcg by mouth daily.  Latuda 20 mg tab tablet TAKE 1 TABLET BY MOUTH EVERY DAY WITH FOOD    hydrOXYzine pamoate (VistariL) 25 mg capsule Take 25 mg by mouth three (3) times daily as needed.  irbesartan-hydroCHLOROthiazide (AVALIDE) 300-12.5 mg per tablet     traZODone (DESYREL) 50 mg tablet Take 75 mg by mouth nightly.     sertraline (ZOLOFT) 50 mg tablet 100 mg.    irbesartan (AVAPRO) 300 mg tablet TAKE 1 TABLET BY MOUTH EVERY DAY FOR BLOOD PRESSURE    tiZANidine (ZANAFLEX) 4 mg tablet TAKE 1 TABLET (4 MG) BY MOUTH EVERY 6 HOURS AS NEEDED NOT TO EXCEED 3 DOSES IN 24 HOURS    ergocalciferol (ERGOCALCIFEROL) 1,250 mcg (50,000 unit) capsule TAKE 1 CAP BY MOUTH TWICE WEEKLY    Amitiza 24 mcg capsule Take  by mouth. Indications: as needed    magnesium hydroxide (MILK OF MAGNESIA) 2,400 mg/10 mL susp suspension Take 15 mL by mouth.  calcium-cholecalciferol, d3, (CALCIUM 600 + D) 600-125 mg-unit tab Take  by mouth.  albuterol (PROVENTIL HFA, VENTOLIN HFA, PROAIR HFA) 90 mcg/actuation inhaler 1-2 Puffs.  benzonatate (TESSALON) 100 mg capsule Take 1 capsule 3 times daily as needed for coughing, swallow capsules whole.  cetirizine (ZYRTEC) 10 mg tablet 10 mg.    ascorbic acid, vitamin C, (VITAMIN C) 500 mg tablet Take 500 mg by mouth daily.  multivitamin (ONE A DAY) tablet Take 1 Tab by mouth daily.  pyridoxine HCl, vitamin B6, (VITAMIN B-6 PO) Take  by mouth daily.  fish oil-omega-3 fatty acids 340-1,000 mg capsule Take 1 Capsule by mouth daily.  FIBER, PSYLLIUM HUSK, PO Take  by mouth daily.  Ascorbic Acid-Multivits-Min (EMERGEN-C) 1,000 mg pwep Take  by mouth daily.  magnesium 250 mg tab Take 400 mg by mouth daily.  zinc 50 mg tab tablet Take 50 mg by mouth daily.  butalbital-acetaminophen-caff (FIORICET) -40 mg per capsule Take 1-2 Caps by mouth every six (6) hours as needed for Pain. Max Daily Amount: 8 Caps.  simvastatin (ZOCOR) 10 mg tablet Take 20 mg by mouth nightly.  hydrochlorothiazide (HYDRODIURIL) 25 mg tablet Take 25 mg by mouth daily.  lidocaine (LIDODERM) 5 % APPLY 1 PATCHES DAILY TO AFFECTED AREAS AS NEEDED FOR PAIN. 12 HOURS ON 12 HOURS OFF (Patient not taking: Reported on 11/9/2021)    ciclopirox (LOPROX) 0.77 % topical cream APPLY TO AFFECTED AREA TWICE A DAY (Patient not taking: Reported on 11/9/2021)       Review of Systems:     A complete 10 point review of systems was performed and pertinents are as per the HPI. Remainder of the review of systems was negative.        Visit Vitals  BP (!) 166/90   Pulse 72   Temp 98.8 °F (37.1 °C)   Resp 20   Ht 5' 1\" (1.549 m)   Wt 103.4 kg (228 lb)   SpO2 100%   Breastfeeding No   BMI 43.08 kg/m²       Physical Assessment:     General: alert, cooperative, no acute distress, appears stated age. HEENT: normocephalic, no scleral icterus, moist mucous membranes, EOMs intact, no neck masses noted. Respiratory: lungs clear to auscultation bilaterally. Cardiovascular: regular rate and rhythm, no murmurs, rubs or gallops. Abdomen: normal bowel sounds, soft, non-tender to palpation. Extremities: no lower extremity edema, no cyanosis or clubbing. Neuro: alert and oriented x 3; non-focal exam.  Skin: no rashes. Psych: normal mood and affect. Som Meza MD  [facility]  [email or web address]  Phone: [phone]  Pager: [phone]  [email or web address]

## 2021-11-08 ENCOUNTER — ANESTHESIA EVENT (OUTPATIENT)
Dept: ENDOSCOPY | Age: 46
End: 2021-11-08
Payer: MEDICAID

## 2021-11-09 ENCOUNTER — HOSPITAL ENCOUNTER (OUTPATIENT)
Age: 46
Setting detail: OUTPATIENT SURGERY
Discharge: HOME | End: 2021-11-09
Attending: INTERNAL MEDICINE | Admitting: INTERNAL MEDICINE
Payer: MEDICAID

## 2021-11-09 ENCOUNTER — ANESTHESIA (OUTPATIENT)
Dept: ENDOSCOPY | Age: 46
End: 2021-11-09
Payer: MEDICAID

## 2021-11-09 VITALS
WEIGHT: 228 LBS | TEMPERATURE: 98.3 F | RESPIRATION RATE: 14 BRPM | SYSTOLIC BLOOD PRESSURE: 156 MMHG | OXYGEN SATURATION: 99 % | BODY MASS INDEX: 43.05 KG/M2 | DIASTOLIC BLOOD PRESSURE: 88 MMHG | HEIGHT: 61 IN | HEART RATE: 67 BPM

## 2021-11-09 PROCEDURE — 76040000019: Performed by: INTERNAL MEDICINE

## 2021-11-09 PROCEDURE — 00811 ANES LWR INTST NDSC NOS: CPT | Performed by: ANESTHESIOLOGY

## 2021-11-09 PROCEDURE — 77030008565 HC TBNG SUC IRR ERBE -B: Performed by: INTERNAL MEDICINE

## 2021-11-09 PROCEDURE — 00811 ANES LWR INTST NDSC NOS: CPT | Performed by: NURSE ANESTHETIST, CERTIFIED REGISTERED

## 2021-11-09 PROCEDURE — 74011250637 HC RX REV CODE- 250/637

## 2021-11-09 PROCEDURE — 74011250636 HC RX REV CODE- 250/636: Performed by: NURSE ANESTHETIST, CERTIFIED REGISTERED

## 2021-11-09 PROCEDURE — 76060000031 HC ANESTHESIA FIRST 0.5 HR: Performed by: INTERNAL MEDICINE

## 2021-11-09 PROCEDURE — 74011250637 HC RX REV CODE- 250/637: Performed by: INTERNAL MEDICINE

## 2021-11-09 PROCEDURE — 2709999900 HC NON-CHARGEABLE SUPPLY: Performed by: INTERNAL MEDICINE

## 2021-11-09 RX ORDER — SODIUM CHLORIDE 0.9 % (FLUSH) 0.9 %
5-40 SYRINGE (ML) INJECTION EVERY 8 HOURS
Status: CANCELLED | OUTPATIENT
Start: 2021-11-09

## 2021-11-09 RX ORDER — FLUMAZENIL 0.1 MG/ML
0.2 INJECTION INTRAVENOUS
Status: CANCELLED | OUTPATIENT
Start: 2021-11-09 | End: 2021-11-09

## 2021-11-09 RX ORDER — ATROPINE SULFATE 0.1 MG/ML
0.5 INJECTION INTRAVENOUS
Status: CANCELLED | OUTPATIENT
Start: 2021-11-09 | End: 2021-11-10

## 2021-11-09 RX ORDER — EPINEPHRINE 0.1 MG/ML
1 INJECTION INTRACARDIAC; INTRAVENOUS
Status: CANCELLED | OUTPATIENT
Start: 2021-11-09 | End: 2021-11-10

## 2021-11-09 RX ORDER — LIDOCAINE HYDROCHLORIDE 10 MG/ML
0.1 INJECTION, SOLUTION EPIDURAL; INFILTRATION; INTRACAUDAL; PERINEURAL AS NEEDED
Status: DISCONTINUED | OUTPATIENT
Start: 2021-11-09 | End: 2021-11-09 | Stop reason: HOSPADM

## 2021-11-09 RX ORDER — FAMOTIDINE 20 MG/1
TABLET, FILM COATED ORAL
Status: COMPLETED
Start: 2021-11-09 | End: 2021-11-09

## 2021-11-09 RX ORDER — NALOXONE HYDROCHLORIDE 0.4 MG/ML
0.4 INJECTION, SOLUTION INTRAMUSCULAR; INTRAVENOUS; SUBCUTANEOUS
Status: CANCELLED | OUTPATIENT
Start: 2021-11-09 | End: 2021-11-09

## 2021-11-09 RX ORDER — SODIUM CHLORIDE 0.9 % (FLUSH) 0.9 %
5-40 SYRINGE (ML) INJECTION AS NEEDED
Status: CANCELLED | OUTPATIENT
Start: 2021-11-09

## 2021-11-09 RX ORDER — DEXTROMETHORPHAN/PSEUDOEPHED 2.5-7.5/.8
DROPS ORAL AS NEEDED
Status: DISCONTINUED | OUTPATIENT
Start: 2021-11-09 | End: 2021-11-09 | Stop reason: HOSPADM

## 2021-11-09 RX ORDER — PROPOFOL 10 MG/ML
INJECTION, EMULSION INTRAVENOUS AS NEEDED
Status: DISCONTINUED | OUTPATIENT
Start: 2021-11-09 | End: 2021-11-09 | Stop reason: HOSPADM

## 2021-11-09 RX ORDER — SODIUM CHLORIDE, SODIUM LACTATE, POTASSIUM CHLORIDE, CALCIUM CHLORIDE 600; 310; 30; 20 MG/100ML; MG/100ML; MG/100ML; MG/100ML
50 INJECTION, SOLUTION INTRAVENOUS CONTINUOUS
Status: DISCONTINUED | OUTPATIENT
Start: 2021-11-10 | End: 2021-11-09 | Stop reason: HOSPADM

## 2021-11-09 RX ORDER — DEXTROMETHORPHAN/PSEUDOEPHED 2.5-7.5/.8
1.2 DROPS ORAL
Status: CANCELLED | OUTPATIENT
Start: 2021-11-09

## 2021-11-09 RX ORDER — FAMOTIDINE 20 MG/1
20 TABLET, FILM COATED ORAL ONCE
Status: COMPLETED | OUTPATIENT
Start: 2021-11-10 | End: 2021-11-09

## 2021-11-09 RX ADMIN — PROPOFOL 20 MG: 10 INJECTION, EMULSION INTRAVENOUS at 14:02

## 2021-11-09 RX ADMIN — PROPOFOL 20 MG: 10 INJECTION, EMULSION INTRAVENOUS at 13:47

## 2021-11-09 RX ADMIN — PROPOFOL 50 MG: 10 INJECTION, EMULSION INTRAVENOUS at 13:48

## 2021-11-09 RX ADMIN — FAMOTIDINE 20 MG: 20 TABLET ORAL at 12:38

## 2021-11-09 RX ADMIN — SODIUM CHLORIDE, SODIUM LACTATE, POTASSIUM CHLORIDE, AND CALCIUM CHLORIDE 50 ML/HR: 600; 310; 30; 20 INJECTION, SOLUTION INTRAVENOUS at 12:39

## 2021-11-09 RX ADMIN — PROPOFOL 20 MG: 10 INJECTION, EMULSION INTRAVENOUS at 13:53

## 2021-11-09 RX ADMIN — FAMOTIDINE 20 MG: 20 TABLET, FILM COATED ORAL at 12:38

## 2021-11-09 RX ADMIN — PROPOFOL 20 MG: 10 INJECTION, EMULSION INTRAVENOUS at 14:00

## 2021-11-09 RX ADMIN — PROPOFOL 60 MG: 10 INJECTION, EMULSION INTRAVENOUS at 13:46

## 2021-11-09 RX ADMIN — PROPOFOL 10 MG: 10 INJECTION, EMULSION INTRAVENOUS at 13:58

## 2021-11-09 RX ADMIN — PROPOFOL 20 MG: 10 INJECTION, EMULSION INTRAVENOUS at 13:49

## 2021-11-09 RX ADMIN — PROPOFOL 20 MG: 10 INJECTION, EMULSION INTRAVENOUS at 13:56

## 2021-11-09 RX ADMIN — PROPOFOL 20 MG: 10 INJECTION, EMULSION INTRAVENOUS at 13:51

## 2021-11-09 NOTE — ANESTHESIA POSTPROCEDURE EVALUATION
Procedure(s):  COLONOSCOPY. MAC    Anesthesia Post Evaluation      Multimodal analgesia: multimodal analgesia not used between 6 hours prior to anesthesia start to PACU discharge  Patient location during evaluation: PACU  Patient participation: complete - patient participated  Level of consciousness: awake  Pain score: 0  Pain management: adequate  Airway patency: patent  Anesthetic complications: no  Cardiovascular status: acceptable  Respiratory status: acceptable  Hydration status: acceptable  Post anesthesia nausea and vomiting:  none  Final Post Anesthesia Temperature Assessment:  Normothermia (36.0-37.5 degrees C)      INITIAL Post-op Vital signs:   Vitals Value Taken Time   BP     Temp     Pulse 68 11/09/21 1412   Resp 22 11/09/21 1412   SpO2 100 % 11/09/21 1412   Vitals shown include unvalidated device data.

## 2021-11-09 NOTE — ANESTHESIA PREPROCEDURE EVALUATION
Relevant Problems   No relevant active problems       Anesthetic History   No history of anesthetic complications            Review of Systems / Medical History  Patient summary reviewed and pertinent labs reviewed    Pulmonary  Within defined limits                 Neuro/Psych         Psychiatric history     Cardiovascular    Hypertension: well controlled                   GI/Hepatic/Renal  Within defined limits              Endo/Other        Morbid obesity     Other Findings              Physical Exam    Airway  Mallampati: II  TM Distance: 4 - 6 cm  Neck ROM: normal range of motion   Mouth opening: Normal     Cardiovascular               Dental         Pulmonary                 Abdominal  GI exam deferred       Other Findings            Anesthetic Plan    ASA: 3  Anesthesia type: MAC            Anesthetic plan and risks discussed with: Patient

## 2021-11-09 NOTE — PROCEDURES
[email or web address] [address]  [place], [address]      Brief Procedure Note    Colin Aquino  1975  [id number]    Date of Procedure: 11/9/2021    Preoperative diagnosis: History of adenomatous colonic polyps:  V12.72 - Z86.010  Colorectal cancer surveillance:  V76.51  Chronic idiopathic constipation:  564.00 - K59.00  Chronic right abdominal wall pain:  R10.9  History of kidney cancer:  Z85.528  Encounter for screening for other viral diseases:  V73.0 - Z11.59  Atypical chest pain:  R07.89  Acquired absence of kidney:  Z90.5  BMI 40.0 - 42.9, adult:  Z68.41  Anxiety disorder:  f41.9    Postoperative diagnosis: hemorrhoids    Type of Anesthesia: MAC (Monitored anesthesia care)    Description of findings: same as post op dx    Procedure: Procedure(s):  COLONOSCOPY    :  Dr. Dariusz Sims MD    Assistant(s): Endoscopy Technician-1: Chang Ramirez  Endoscopy RN-1: Patricia Velez, MARIA ELENA; Halford Closs, RN    EBL:None    Specimens:  None    Findings: See printed and scanned procedure note    Complications: None    Dr. Dariusz Sims MD  11/9/2021  2:08 PM

## 2021-11-09 NOTE — DISCHARGE INSTRUCTIONS
Colonoscopy: What to Expect at [address] [place] [address]  After you have a colonoscopy, you will stay at the clinic for 1 to 2 hours until the medicines wear off. Then you can go home. But you will need to arrange for a ride. Your doctor will tell you when you can eat and do your other usual activities. Your doctor will talk to you about when you will need your next colonoscopy. Your doctor can help you decide how often you need to be checked. This will depend on the results of your test and your risk for colorectal cancer. After the test, you may be bloated or have gas pains. You may need to pass gas. If a biopsy was done or a polyp was removed, you may have streaks of blood in your stool (feces) for a few days. This care sheet gives you a general idea about how long it will take for you to recover. But each person recovers at a different pace. Follow the steps below to get better as quickly as possible. How can you care for yourself at home? Activity  · Rest when you feel tired. · You can do your normal activities when it feels okay to do so. Diet  · Follow your doctor's directions for eating. · Unless your doctor has told you not to, drink plenty of fluids. This helps to replace the fluids that were lost during the colon prep. · Do not drink alcohol. Medicines  · If polyps were removed or a biopsy was done during the test, your doctor may tell you not to take aspirin or other anti-inflammatory medicines for a few days. These include ibuprofen (Advil, Motrin) and naproxen (Aleve). Other instructions  · For your safety, do not drive or operate machinery until the medicine wears off and you can think clearly. Your doctor may tell you not to drive or operate machinery until the day after your test.  · Do not sign legal documents or make major decisions until the medicine wears off and you can think clearly. The anesthesia can make it hard for you to fully understand what you are agreeing to.   Follow-up care is a key part of your treatment and safety. Be sure to make and go to all appointments, and call your doctor if you are having problems. It's also a good idea to know your test results and keep a list of the medicines you take. When should you call for help? Call 911 anytime you think you may need emergency care. For example, call if:  · You passed out (lost consciousness). · You pass maroon or bloody stools. · You have severe belly pain. Call your doctor now or seek immediate medical care if:  · Your stools are black and tarlike. · Your stools have streaks of blood, but you did not have a biopsy or any polyps removed. · You have belly pain, or your belly is swollen and firm. · You vomit. · You have a fever. · You are very dizzy. Watch closely for changes in your health, and be sure to contact your doctor if you have any problems. DISCHARGE SUMMARY from Nurse     POST-PROCEDURE INSTRUCTIONS:    Call your Physician if you:  ? Observe any excess bleeding. ? Develop a temperature over 100.5o F.  ? Experience abdominal, shoulder or chest pain. ? Notice any signs of decreased circulation or nerve impairment to an extremity such as a change in color, persistent numbness, tingling, coldness or increase in pain. ? Vomit blood or you have nausea and vomiting lasting longer than 4 hours. ? Are unable to take medications. ? Are unable to urinate within 8 hours after discharge following general anesthesia or intravenous sedation. For the next 24 hours after receiving general anesthesia or intravenous sedation, or while taking prescription Narcotics, limit your activities:  ? Do NOT drive a motor vehicle, operate hazard machinery or power tools, or perform tasks that require coordination. The medication you received during your procedure may have some effect on your mental awareness. ? Do NOT make important personal or business decisions.   The medication you received during your procedure may have some effect on your mental awareness. ? Do NOT drink alcoholic beverages. These drinks do not mix well with the medications that have been given to you. ? Upon discharge from the hospital, you must be accompanied by a responsible adult. ? Resume your diet as directed by your physician. ? Resume medications as your physician has prescribed. ? Please give a list of your current medications to your Primary Care Provider. ? Please update this list whenever your medications are discontinued, doses are changed, or new medications (including over-the-counter products) are added. ? Please carry medication information at all times in case of emergency situations. These are general instructions for a healthy lifestyle:    No smoking/ No tobacco products/ Avoid exposure to second hand smoke.  Surgeon General's Warning:  Quitting smoking now greatly reduces serious risk to your health. Obesity, smoking, and a sedentary lifestyle greatly increase your risk for illness.  A healthy diet, regular physical exercise & weight monitoring are important for maintaining a healthy lifestyle   You may be retaining fluid if you have a history of heart failure or if you experience any of the following symptoms:  Weight gain of 3 pounds or more overnight or 5 pounds in a week, increased swelling in our hands or feet or shortness of breath while lying flat in bed. Please call your doctor as soon as you notice any of these symptoms; do not wait until your next office visit. Colorectal Screening   Colorectal cancer almost always develops from precancerous polyps (abnormal growths) in the colon or rectum. Screening tests can find precancerous polyps, so that they can be removed before they turn into cancer. Screening tests can also find colorectal cancer early, when treatment works best.  [facility] Speak with your physician about when you should begin screening and how often you should be tested.     Additional Information    Educational references and/or instructions provided during this visit included:    See attached. APPOINTMENTS:    Per MD Instruction. Discharge information has been reviewed with the patient. The patient verbalized understanding. Hemorrhoids: Care Instructions  Overview     Hemorrhoids are swollen veins that develop in the anal canal. Bleeding during bowel movements, itching, and rectal pain are the most common symptoms. Hemorrhoids can be uncomfortable at times, but rarely are they a serious problem. Most of the time, you can treat them with simple changes to your diet and bowel habits. These changes include eating more fiber and not straining to pass stools. Most hemorrhoids don't need surgery or other treatment unless they are very large and painful or bleed a lot. Follow-up care is a key part of your treatment and safety. Be sure to make and go to all appointments, and call your doctor if you are having problems. It's also a good idea to know your test results and keep a list of the medicines you take. How can you care for yourself at home? · Sit in a few inches of warm water (sitz bath) 3 times a day and after bowel movements. The warm water helps with pain and itching. · Put ice on your anal area several times a day for 10 minutes at a time. Put a thin cloth between the ice and your skin. Follow this by placing a warm, wet towel on the area for another 10 to 20 minutes. · Take pain medicines exactly as directed. ? If the doctor gave you a prescription medicine for pain, take it as prescribed. ? If you are not taking a prescription pain medicine, ask your doctor if you can take an over-the-counter medicine. · Keep the anal area clean, but be gentle. Use water and a fragrance-free soap, or use baby wipes or medicated pads such as Tucks. · Wear cotton underwear and loose clothing to decrease moisture in the anal area. · Eat more fiber.  Include foods such as whole-grain breads and cereals, raw vegetables, raw and dried fruits, and beans. · Drink plenty of fluids. If you have kidney, heart, or liver disease and have to limit fluids, talk with your doctor before you increase the amount of fluids you drink. · Use a stool softener that contains bran or psyllium. You can save money by buying bran or psyllium (available in bulk at most health food stores) and sprinkling it on foods or stirring it into fruit juice. Or you can use a product such as Metamucil or Hydrocil. · Practice healthy bowel habits. ? Go to the bathroom as soon as you have the urge. ? Avoid straining to pass stools. Relax and give yourself time to let things happen naturally. ? Do not hold your breath while passing stools. ? Do not read while sitting on the toilet. Get off the toilet as soon as you have finished. · Take your medicines exactly as prescribed. Call your doctor if you think you are having a problem with your medicine. When should you call for help? Call 911 anytime you think you may need emergency care. For example, call if:    · You pass maroon or very bloody stools. Call your doctor now or seek immediate medical care if:    · You have increased pain.     · You have increased bleeding. Watch closely for changes in your health, and be sure to contact your doctor if:    · Your symptoms have not improved after 3 or 4 days. High-Fiber Diet: Care Instructions  Overview     A high-fiber diet may help you relieve constipation and feel less bloated. Your doctor and dietitian will help you make a high-fiber eating plan based on your personal needs. The plan will include the things you like to eat. It will also make sure that you get 25 to 35 grams of fiber a day. Before you make changes to the way you eat, be sure to talk with your doctor or dietitian. Follow-up care is a key part of your treatment and safety.  Be sure to make and go to all appointments, and call your doctor if you are having problems. It's also a good idea to know your test results and keep a list of the medicines you take. How can you care for yourself at home? · You can increase how much fiber you get if you eat more of certain foods. These foods include:  ? Whole-grain breads and cereals. ? Fruits, such as pears, apples, and peaches. Eat the skins and peels if you can.  ? Vegetables, such as broccoli, cabbage, spinach, carrots, asparagus, and squash. ? Starchy vegetables. These include potatoes with skins, kidney beans, and lima beans. · Take a fiber supplement every day if your doctor recommends it. Examples are Benefiber, Citrucel, FiberCon, and Metamucil. Ask your doctor how much to take. · Drink plenty of fluids. If you have kidney, heart, or liver disease and have to limit fluids, talk with your doctor before you increase the amount of fluids you drink.

## 2021-11-17 ENCOUNTER — HOSPITAL ENCOUNTER (OUTPATIENT)
Dept: NUTRITION | Age: 46
End: 2021-11-17

## 2021-11-19 ENCOUNTER — VIRTUAL VISIT (OUTPATIENT)
Dept: SURGERY | Age: 46
End: 2021-11-19
Payer: MEDICAID

## 2021-11-19 VITALS — WEIGHT: 222 LBS | BODY MASS INDEX: 41.91 KG/M2 | HEIGHT: 61 IN

## 2021-11-19 DIAGNOSIS — E66.01 MORBID OBESITY (HCC): ICD-10-CM

## 2021-11-19 DIAGNOSIS — Z71.3 ENCOUNTER FOR WEIGHT LOSS COUNSELING: ICD-10-CM

## 2021-11-19 DIAGNOSIS — Z71.3 ENCOUNTER FOR DIETARY COUNSELING AND SURVEILLANCE: Primary | ICD-10-CM

## 2021-11-19 PROCEDURE — 99212 OFFICE O/P EST SF 10 MIN: CPT | Performed by: NURSE PRACTITIONER

## 2021-11-19 NOTE — PROGRESS NOTES
Consent:  Janina Solorio  and/or her healthcare decision maker is aware that this patient-initiated Telehealth encounter is a billable service, with coverage as determined by her insurance carrier. She is aware that she may receive a bill and has provided verbal consent to proceed: Yes    Services were provided through a video synchronous discussion virtually to substitute for in-person clinic visit. Pursuant to the emergency declaration under the [facility], [facility] waiver authority and the [employer] Act, this Virtual  Visit was conducted, with patient's consent, to reduce the patient's risk of exposure to COVID-19 and provide continuity of care for an established patient. Provider location while conducting visit: in the office   Patient location: Home  Other person participating in the telehealth services: Job Hester MA  This virtual visit was conducted via interactive/real-time audio/video using [employer] Me   Visit start: 2057  Visit end: 1004      [facility] Loss Program Progress Note:   F/up Provider Visit    CC: Weight Management    Janina Solorio is a 48 y.o. female who is here for her  f/up medical provider visit for the LCD Program. she did attend class last week. -4 lbs  Increase protein and decreased carbs. EGD scheduled first week of December  Working out with [employer] and jogging for 10 min twice daily    Did you have any problems adhering to the program last week? no  If yes, please explain:     Since your last visit, have you experienced any complications? no    Have you received any other medical care this week? yes  If yes, where and for what? Colonoscopy 11/9/2021 (internal hemorrhoids)    Have you had any change in your medications since your last visit? yes  If yes what? Added antacid      Are you taking an appetite suppressant? no   If yes:  Do you need a refill?      BP Readings from Last 3 Encounters:   11/09/21 (!) 156/88   10/06/21 (!) 165/92   07/23/21 122/86        Eating Habits Over Last Week:  Did you take in 64 oz of non-caloric fluids? yes     Did you consume your prescribed meal replacement regimen each day? no   2 MR  Lunch/ Dinner- Baked fish, baked chicken, tuna, mixed veggies, salad, corn, 657 bread   Snack- pomegranate     Physical Activity Over the Past Week:    Aerobic exercise: 210 min  Resistance exercise: 0 workouts / week      Weight History  Weight Metrics 11/19/2021 11/9/2021 10/6/2021 8/23/2021 7/23/2021 5/17/2021 5/17/2021   Weight 222 lb 228 lb 226 lb 222 lb 225 lb 8 oz - 217 lb 4.8 oz   Waist Measure Inches - - - - - 37 -   Exercise Mins/week - - - - - - -   Body Fat % - - - - - - -   BMI 41.95 kg/m2 43.08 kg/m2 42.7 kg/m2 41.95 kg/m2 42.61 kg/m2 - 41.06 kg/m2       Ideal body weight: 47.8 kg (105 lb 6.1 oz)  Adjusted ideal body weight: 69 kg (152 lb 0.5 oz)  Body mass index is 41.95 kg/m². History    Past Medical History:   Diagnosis Date    Anxiety     [facility]     kidney    Chronic kidney disease     Stage II    Depression     pt states anxiety schizoaffective ptsd    Eye twitch     Hypertension     Personal history of kidney cancer     Psychotic affective disorder ([facility]     attention deficit disorder, and PTSD    Psychotic disorder ([facility]     Schizoaffective disorder       Past Surgical History:   Procedure Laterality Date    COLONOSCOPY N/A 11/9/2021    COLONOSCOPY performed by Cameron Padilla MD at [address] [place] [address] HX COLONOSCOPY      HX HYSTERECTOMY      HX NEPHRECTOMY Left     HX OOPHORECTOMY Right     HX OTHER SURGICAL      left nephrectomy       Current Outpatient Medications   Medication Sig Dispense Refill    furosemide (LASIX) 20 mg tablet Take 20 mg by mouth.  terbinafine HCL (LAMISIL) 250 mg tablet Take 250 mg by mouth daily.  cyanocobalamin (Vitamin B-12) 1,000 mcg tablet Take 1,000 mcg by mouth daily.       Latuda 20 mg tab tablet TAKE 1 TABLET BY MOUTH EVERY DAY WITH FOOD      hydrOXYzine pamoate (VistariL) 25 mg capsule Take 25 mg by mouth three (3) times daily as needed.  irbesartan-hydroCHLOROthiazide (AVALIDE) 300-12.5 mg per tablet       traZODone (DESYREL) 50 mg tablet Take 75 mg by mouth nightly.  lidocaine (LIDODERM) 5 % APPLY 1 PATCHES DAILY TO AFFECTED AREAS AS NEEDED FOR PAIN. 12 HOURS ON 12 HOURS OFF      sertraline (ZOLOFT) 50 mg tablet 100 mg.      ciclopirox (LOPROX) 0.77 % topical cream APPLY TO AFFECTED AREA TWICE A DAY      tiZANidine (ZANAFLEX) 4 mg tablet TAKE 1 TABLET (4 MG) BY MOUTH EVERY 6 HOURS AS NEEDED NOT TO EXCEED 3 DOSES IN 24 HOURS      ergocalciferol (ERGOCALCIFEROL) 1,250 mcg (50,000 unit) capsule TAKE 1 CAP BY MOUTH TWICE WEEKLY      Amitiza 24 mcg capsule Take  by mouth. Indications: as needed      calcium-cholecalciferol, d3, (CALCIUM 600 + D) 600-125 mg-unit tab Take  by mouth.  albuterol (PROVENTIL HFA, VENTOLIN HFA, PROAIR HFA) 90 mcg/actuation inhaler 1-2 Puffs.  cetirizine (ZYRTEC) 10 mg tablet 10 mg.      ascorbic acid, vitamin C, (VITAMIN C) 500 mg tablet Take 500 mg by mouth daily.  multivitamin (ONE A DAY) tablet Take 1 Tab by mouth daily.  pyridoxine HCl, vitamin B6, (VITAMIN B-6 PO) Take  by mouth daily.  fish oil-omega-3 fatty acids 340-1,000 mg capsule Take 1 Capsule by mouth daily.  FIBER, PSYLLIUM HUSK, PO Take  by mouth daily.  magnesium 250 mg tab Take 400 mg by mouth daily.  zinc 50 mg tab tablet Take 50 mg by mouth daily.  simvastatin (ZOCOR) 10 mg tablet Take 20 mg by mouth nightly.  irbesartan (AVAPRO) 300 mg tablet TAKE 1 TABLET BY MOUTH EVERY DAY FOR BLOOD PRESSURE (Patient not taking: Reported on 11/19/2021)      magnesium hydroxide (MILK OF MAGNESIA) 2,400 mg/10 mL susp suspension Take 15 mL by mouth.  (Patient not taking: Reported on 11/19/2021)      benzonatate (TESSALON) 100 mg capsule Take 1 capsule 3 times daily as needed for coughing, swallow capsules whole. (Patient not taking: Reported on 2021)      Ascorbic Acid-Multivits-Min (EMERGEN-C) 1,000 mg pwep Take  by mouth daily. (Patient not taking: Reported on 2021)      butalbital-acetaminophen-caff (FIORICET) -40 mg per capsule Take 1-2 Caps by mouth every six (6) hours as needed for Pain. Max Daily Amount: 8 Caps. (Patient not taking: Reported on 2021) 20 Cap 0    hydrochlorothiazide (HYDRODIURIL) 25 mg tablet Take 25 mg by mouth daily. (Patient not taking: Reported on 2021)         Allergies   Allergen Reactions    Seafood Hives and Swelling    Iodine Other (comments)    Nsaids (Non-Steroidal Anti-Inflammatory Drug) Other (comments)    Shellfish Derived Angioedema       Social History     Tobacco Use    Smoking status: Former Smoker     Quit date: 2011     Years since quittin.9    Smokeless tobacco: Never Used   Vaping Use    Vaping Use: Never used   Substance Use Topics    Alcohol use: Not Currently    Drug use: No       No family history on file. No family status information on file. Medications:        Review of Systems  Review of Systems   Constitutional: Positive for weight loss. All other systems reviewed and are negative. Objective  Visit Vitals  Ht 5' 1\" (1.549 m)   Wt 100.7 kg (222 lb)   BMI 41.95 kg/m²     No LMP recorded. Patient has had a hysterectomy. Physical Exam  Physical Exam  Constitutional:       Appearance: She is obese. HENT:      Head: Normocephalic and atraumatic. Pulmonary:      Effort: Pulmonary effort is normal.   Neurological:      General: No focal deficit present. Mental Status: She is alert and oriented to person, place, and time.    Psychiatric:         Mood and Affect: Mood normal.         Behavior: Behavior normal.       Recent Results (from the past 672 hour(s))   SARS-COV-2 BY MILLY    Collection Time: 21  3:00 PM   Result Value Ref Range SARS-CoV-2, MILLY Not detected NOTD           Assessment / Plan    Encounter Diagnoses   Name Primary?  Encounter for dietary counseling and surveillance Yes    Encounter for weight loss counseling     Morbid obesity ([facility]     BMI 40.0-44.9, adult ([facility]        1. Weight management      Today, the patient is  Height: 5' 1\" (154.9 cm) tall, Weight: 100.7 kg (222 lb) lbs for a Body mass index is 41.95 kg/m². is suffering from morbid obesity      Degree of control: Good control this month. Will continue with 2 MR + 1 meal   Progress was reviewed with patient. Goal(s) for next appointment:   -4 lbs  Increase PA as tolerated    I have reviewed/discussed the above normal BMI with the patient. I have recommended the following interventions: dietary management education, guidance, and counseling, encourage exercise, monitor weight and prescribed dietary intake . Jazzy Cook The primary encounter diagnosis was Encounter for dietary counseling and surveillance. Diagnoses of Encounter for weight loss counseling, Morbid obesity ([facility] and BMI 40.0-44.9, adult [facility]) were also pertinent to this visit. Follow-up and Dispositions    · Return in about 4 weeks (around 12/17/2021).      EDWARD Ramos

## 2021-12-02 RX ORDER — SIMVASTATIN 20 MG/1
20 TABLET, FILM COATED ORAL
COMMUNITY
Start: 2021-11-02

## 2021-12-02 RX ORDER — PANTOPRAZOLE SODIUM 40 MG/1
40 TABLET, DELAYED RELEASE ORAL DAILY
COMMUNITY
Start: 2021-11-11

## 2021-12-02 RX ORDER — LINACLOTIDE 290 UG/1
290 CAPSULE, GELATIN COATED ORAL AS NEEDED
COMMUNITY
Start: 2021-11-13

## 2021-12-02 RX ORDER — FAMOTIDINE 20 MG/1
20 TABLET, FILM COATED ORAL AS NEEDED
COMMUNITY
Start: 2021-08-11 | End: 2021-12-02 | Stop reason: ALTCHOICE

## 2021-12-02 RX ORDER — SERTRALINE HYDROCHLORIDE 100 MG/1
100 TABLET, FILM COATED ORAL DAILY
COMMUNITY
Start: 2021-11-02 | End: 2022-04-12

## 2021-12-02 NOTE — PERIOP NOTES
PRE-SURGICAL INSTRUCTIONS        Patient's Name:  Megan MENJIVAR Date:  12/2/2021            Covid Testing Date and Time:    Surgery Date:  12/7/2021                1. Do NOT eat or drink anything, including candy, gum, or ice chips after midnight on 12/7, unless you have specific instructions from your surgeon or anesthesia provider to do so.  2. You may brush your teeth before coming to the hospital.  3. No smoking 24 hours prior to the day of surgery. 4. No alcohol 24 hours prior to the day of surgery. 5. No recreational drugs for one week prior to the day of surgery. 6. Leave all valuables, including money/purse, at home. 7. Remove all jewelry, nail polish, acrylic nails, and makeup (including mascara); no lotions powders, deodorant, or perfume/cologne/after shave on the skin. 8. Follow instruction for Hibiclens washes and CHG wipes from surgeon's office. 9. Glasses/contact lenses and dentures may be worn to the hospital.  They will be removed prior to surgery. 10. Call your doctor if symptoms of a cold or illness develop within 24-48 hours prior to your surgery. 11.  If you are having an outpatient procedure, please make arrangements for a responsible ADULT TO [address] [place] [address] and stay with you for 24 hours after your surgery. 12. ONE VISITOR in the hospital at this time for outpatient procedures. Exceptions may be made for surgical admissions, per nursing unit guidelines      Special Instructions:      Bring list of CURRENT medications. Bring inhaler. Bring any pertinent legal medical records. Take these medications the morning of surgery with a sip of water:  As directed by MD  Follow physician instructions about insulin. Follow physician instructions about stopping anticoagulants. On the day of surgery, come in the main entrance of [facility]. Let the  at the desk know you are there for surgery.   A staff member will come escort you to the surgical area on the second floor. If you have any questions or concerns, please do not hesitate to call:     (Prior to the day of surgery) PAT department:  [phone]   (Day of surgery) Pre-Op department:  [phone]    These surgical instructions were reviewed with Christi Masterson during the PAT phone call.

## 2021-12-06 ENCOUNTER — ANESTHESIA EVENT (OUTPATIENT)
Dept: ENDOSCOPY | Age: 46
End: 2021-12-06
Payer: MEDICAID

## 2021-12-07 ENCOUNTER — ANESTHESIA (OUTPATIENT)
Dept: ENDOSCOPY | Age: 46
End: 2021-12-07
Payer: MEDICAID

## 2021-12-07 ENCOUNTER — HOSPITAL ENCOUNTER (OUTPATIENT)
Age: 46
Setting detail: OUTPATIENT SURGERY
Discharge: HOME | End: 2021-12-07
Attending: INTERNAL MEDICINE | Admitting: INTERNAL MEDICINE
Payer: MEDICAID

## 2021-12-07 VITALS
DIASTOLIC BLOOD PRESSURE: 99 MMHG | RESPIRATION RATE: 18 BRPM | BODY MASS INDEX: 44.17 KG/M2 | TEMPERATURE: 97.5 F | SYSTOLIC BLOOD PRESSURE: 166 MMHG | WEIGHT: 225 LBS | OXYGEN SATURATION: 100 % | HEIGHT: 60 IN | HEART RATE: 68 BPM

## 2021-12-07 PROCEDURE — 76060000031 HC ANESTHESIA FIRST 0.5 HR: Performed by: INTERNAL MEDICINE

## 2021-12-07 PROCEDURE — 88342 IMHCHEM/IMCYTCHM 1ST ANTB: CPT

## 2021-12-07 PROCEDURE — 74011250636 HC RX REV CODE- 250/636: Performed by: NURSE ANESTHETIST, CERTIFIED REGISTERED

## 2021-12-07 PROCEDURE — 77030008565 HC TBNG SUC IRR ERBE -B: Performed by: INTERNAL MEDICINE

## 2021-12-07 PROCEDURE — 00731 ANES UPR GI NDSC PX NOS: CPT | Performed by: ANESTHESIOLOGY

## 2021-12-07 PROCEDURE — 77030019988 HC FCPS ENDOSC DISP BSC -B: Performed by: INTERNAL MEDICINE

## 2021-12-07 PROCEDURE — 2709999900 HC NON-CHARGEABLE SUPPLY: Performed by: INTERNAL MEDICINE

## 2021-12-07 PROCEDURE — 88305 TISSUE EXAM BY PATHOLOGIST: CPT

## 2021-12-07 PROCEDURE — 00731 ANES UPR GI NDSC PX NOS: CPT | Performed by: NURSE ANESTHETIST, CERTIFIED REGISTERED

## 2021-12-07 PROCEDURE — 74011000250 HC RX REV CODE- 250: Performed by: NURSE ANESTHETIST, CERTIFIED REGISTERED

## 2021-12-07 PROCEDURE — 76040000019: Performed by: INTERNAL MEDICINE

## 2021-12-07 RX ORDER — NALOXONE HYDROCHLORIDE 0.4 MG/ML
0.2 INJECTION, SOLUTION INTRAMUSCULAR; INTRAVENOUS; SUBCUTANEOUS AS NEEDED
Status: CANCELLED | OUTPATIENT
Start: 2021-12-07

## 2021-12-07 RX ORDER — LIDOCAINE HYDROCHLORIDE 20 MG/ML
INJECTION, SOLUTION EPIDURAL; INFILTRATION; INTRACAUDAL; PERINEURAL AS NEEDED
Status: DISCONTINUED | OUTPATIENT
Start: 2021-12-07 | End: 2021-12-07 | Stop reason: HOSPADM

## 2021-12-07 RX ORDER — SODIUM CHLORIDE, SODIUM LACTATE, POTASSIUM CHLORIDE, CALCIUM CHLORIDE 600; 310; 30; 20 MG/100ML; MG/100ML; MG/100ML; MG/100ML
50 INJECTION, SOLUTION INTRAVENOUS CONTINUOUS
Status: DISCONTINUED | OUTPATIENT
Start: 2021-12-07 | End: 2021-12-07 | Stop reason: HOSPADM

## 2021-12-07 RX ORDER — NALOXONE HYDROCHLORIDE 0.4 MG/ML
0.4 INJECTION, SOLUTION INTRAMUSCULAR; INTRAVENOUS; SUBCUTANEOUS
Status: CANCELLED | OUTPATIENT
Start: 2021-12-07 | End: 2021-12-07

## 2021-12-07 RX ORDER — PROPOFOL 10 MG/ML
INJECTION, EMULSION INTRAVENOUS AS NEEDED
Status: DISCONTINUED | OUTPATIENT
Start: 2021-12-07 | End: 2021-12-07 | Stop reason: HOSPADM

## 2021-12-07 RX ORDER — SODIUM CHLORIDE 0.9 % (FLUSH) 0.9 %
5-40 SYRINGE (ML) INJECTION AS NEEDED
Status: CANCELLED | OUTPATIENT
Start: 2021-12-07

## 2021-12-07 RX ORDER — SODIUM CHLORIDE 0.9 % (FLUSH) 0.9 %
5-40 SYRINGE (ML) INJECTION EVERY 8 HOURS
Status: CANCELLED | OUTPATIENT
Start: 2021-12-07

## 2021-12-07 RX ORDER — ONDANSETRON 2 MG/ML
4 INJECTION INTRAMUSCULAR; INTRAVENOUS ONCE
Status: CANCELLED | OUTPATIENT
Start: 2021-12-07 | End: 2021-12-07

## 2021-12-07 RX ORDER — FLUMAZENIL 0.1 MG/ML
0.2 INJECTION INTRAVENOUS
Status: CANCELLED | OUTPATIENT
Start: 2021-12-07 | End: 2021-12-07

## 2021-12-07 RX ORDER — SODIUM CHLORIDE, SODIUM LACTATE, POTASSIUM CHLORIDE, CALCIUM CHLORIDE 600; 310; 30; 20 MG/100ML; MG/100ML; MG/100ML; MG/100ML
75 INJECTION, SOLUTION INTRAVENOUS CONTINUOUS
Status: CANCELLED | OUTPATIENT
Start: 2021-12-07

## 2021-12-07 RX ORDER — DEXTROMETHORPHAN/PSEUDOEPHED 2.5-7.5/.8
1.2 DROPS ORAL
Status: CANCELLED | OUTPATIENT
Start: 2021-12-07

## 2021-12-07 RX ORDER — EPINEPHRINE 0.1 MG/ML
1 INJECTION INTRACARDIAC; INTRAVENOUS
Status: CANCELLED | OUTPATIENT
Start: 2021-12-07 | End: 2021-12-08

## 2021-12-07 RX ORDER — ATROPINE SULFATE 0.1 MG/ML
0.5 INJECTION INTRAVENOUS
Status: CANCELLED | OUTPATIENT
Start: 2021-12-07 | End: 2021-12-08

## 2021-12-07 RX ORDER — LIDOCAINE HYDROCHLORIDE 10 MG/ML
0.1 INJECTION, SOLUTION EPIDURAL; INFILTRATION; INTRACAUDAL; PERINEURAL AS NEEDED
Status: DISCONTINUED | OUTPATIENT
Start: 2021-12-07 | End: 2021-12-07 | Stop reason: HOSPADM

## 2021-12-07 RX ADMIN — PROPOFOL 25 MG: 10 INJECTION, EMULSION INTRAVENOUS at 11:43

## 2021-12-07 RX ADMIN — PROPOFOL 50 MG: 10 INJECTION, EMULSION INTRAVENOUS at 11:39

## 2021-12-07 RX ADMIN — LIDOCAINE HYDROCHLORIDE 50 MG: 20 INJECTION, SOLUTION EPIDURAL; INFILTRATION; INTRACAUDAL; PERINEURAL at 11:35

## 2021-12-07 RX ADMIN — PROPOFOL 100 MG: 10 INJECTION, EMULSION INTRAVENOUS at 11:35

## 2021-12-07 RX ADMIN — SODIUM CHLORIDE, SODIUM LACTATE, POTASSIUM CHLORIDE, AND CALCIUM CHLORIDE 50 ML/HR: 600; 310; 30; 20 INJECTION, SOLUTION INTRAVENOUS at 10:18

## 2021-12-07 NOTE — H&P
[email or web address]  [phone]    GASTROENTEROLOGY Pre-Procedure H and P      Impression/Plan:   1. This patient is consented for an EGD with dilation for GERD, atypical chest pain and dysphagia. Chief Complaint: GERD, atypical chest pain and dysphagia. HPI:  Ulla Bosworth is a 48 y.o. female who presents for an EGD with dilation for evaluation of GERD, atypical chest pain and dysphagia.     PMH:   Past Medical History:   Diagnosis Date    ADHD     Anxiety     Bronchitis     Cancer ([facility]     kidney    Chronic kidney disease     Stage II    Depression     pt states anxiety schizoaffective ptsd    Eye twitch     Hypertension     Personal history of kidney cancer     Psychotic affective disorder ([facility]     attention deficit disorder, and PTSD    Psychotic disorder ([facility]     Schizoaffective disorder       PSH:   Past Surgical History:   Procedure Laterality Date    COLONOSCOPY N/A 11/9/2021    COLONOSCOPY performed by Braulio Montiel MD at [facility]OSCOPY    HX COLONOSCOPY      HX HYSTERECTOMY      HX NEPHRECTOMY Left     HX OOPHORECTOMY Right     HX OTHER SURGICAL      left nephrectomy       Social HX:   Social History     Socioeconomic History    Marital status: SINGLE     Spouse name: Not on file    Number of children: Not on file    Years of education: Not on file    Highest education level: Not on file   Occupational History    Not on file   Tobacco Use    Smoking status: Former Smoker     Quit date: 11/27/2011     Years since quitting: 10.0    Smokeless tobacco: Never Used   Vaping Use    Vaping Use: Never used   Substance and Sexual Activity    Alcohol use: Not Currently    Drug use: No    Sexual activity: Not on file   Other Topics Concern    Not on file   Social History Narrative    ** Merged History Encounter **          Social Determinants of Health     Financial Resource Strain:     Difficulty of Paying Living Expenses: Not on file   Food Insecurity:     Worried About Running Out of Food in the Last Year: Not on file    Ran Out of Food in the Last Year: Not on file   Transportation Needs:     Lack of Transportation (Medical): Not on file    Lack of Transportation (Non-Medical): Not on file   Physical Activity:     Days of Exercise per Week: Not on file    Minutes of Exercise per Session: Not on file   Stress:     Feeling of Stress : Not on file   Social Connections:     Frequency of Communication with Friends and Family: Not on file    Frequency of Social Gatherings with Friends and Family: Not on file    Attends Latter-day Services: Not on file    Active Member of [address] [place] [address] [employer] or Organizations: Not on file    Attends Club or Organization Meetings: Not on file    Marital Status: Not on file   Intimate Partner Violence:     Fear of Current or Ex-Partner: Not on file    Emotionally Abused: Not on file    Physically Abused: Not on file    Sexually Abused: Not on file   Housing Stability:     Unable to Pay for Housing in the Last Year: Not on file    Number of [place] in the Last Year: Not on file    Unstable Housing in the Last Year: Not on file       FHX:   History reviewed. No pertinent family history. Allergy:   Allergies   Allergen Reactions    Seafood Hives and Swelling    Iodine Other (comments)    Nsaids (Non-Steroidal Anti-Inflammatory Drug) Other (comments)    Shellfish Derived Angioedema       Home Medications:     Medications Prior to Admission   Medication Sig    simvastatin (ZOCOR) 20 mg tablet Take 20 mg by mouth nightly.  sertraline (ZOLOFT) 100 mg tablet Take 100 mg by mouth daily.  Linzess 290 mcg cap capsule Take 290 mcg by mouth as needed.  pantoprazole (PROTONIX) 40 mg tablet Take 40 mg by mouth daily as needed.  furosemide (LASIX) 20 mg tablet Take 20 mg by mouth.  cyanocobalamin (Vitamin B-12) 1,000 mcg tablet Take 1,000 mcg by mouth daily.     Latuda 20 mg tab tablet TAKE 1 TABLET BY MOUTH EVERY DAY WITH FOOD    traZODone (DESYREL) 50 mg tablet Take 75 mg by mouth nightly.  lidocaine (LIDODERM) 5 % APPLY 1 PATCHES DAILY TO AFFECTED AREAS AS NEEDED FOR PAIN. 12 HOURS ON 12 HOURS OFF    irbesartan (AVAPRO) 300 mg tablet TAKE 1 TABLET BY MOUTH EVERY DAY FOR BLOOD PRESSURE    ciclopirox (LOPROX) 0.77 % topical cream APPLY TO AFFECTED AREA TWICE A DAY    tiZANidine (ZANAFLEX) 4 mg tablet TAKE 1 TABLET (4 MG) BY MOUTH EVERY 6 HOURS AS NEEDED NOT TO EXCEED 3 DOSES IN 24 HOURS    ergocalciferol (ERGOCALCIFEROL) 1,250 mcg (50,000 unit) capsule TAKE 1 CAP BY MOUTH TWICE WEEKLY    magnesium hydroxide (MILK OF MAGNESIA) 2,400 mg/10 mL susp suspension Take 15 mL by mouth daily as needed.  albuterol (PROVENTIL HFA, VENTOLIN HFA, PROAIR HFA) 90 mcg/actuation inhaler 1-2 Puffs.  cetirizine (ZYRTEC) 10 mg tablet 10 mg.    multivitamin (ONE A DAY) tablet Take 1 Tab by mouth daily.  pyridoxine HCl, vitamin B6, (VITAMIN B-6 PO) Take  by mouth daily.  fish oil-omega-3 fatty acids 340-1,000 mg capsule Take 1 Capsule by mouth daily.  Ascorbic Acid-Multivits-Min (EMERGEN-C) 1,000 mg pwep Take  by mouth daily.  zinc 50 mg tab tablet Take 50 mg by mouth daily.  hydrOXYzine pamoate (VistariL) 25 mg capsule Take 25 mg by mouth three (3) times daily as needed.  Amitiza 24 mcg capsule Take  by mouth. Indications: as needed (Patient not taking: No sig reported)    benzonatate (TESSALON) 100 mg capsule Take 1 capsule 3 times daily as needed for coughing, swallow capsules whole. (Patient not taking: Reported on 12/7/2021)    FIBER, PSYLLIUM HUSK, PO Take  by mouth daily as needed.  magnesium 250 mg tab Take 400 mg by mouth daily. (Patient not taking: Reported on 12/2/2021)       Review of Systems:     A complete 10 point review of systems was performed and pertinents are as per the HPI. Remainder of the review of systems was negative.        Visit Vitals  BP (!) 160/84   Pulse 62   Temp 98 °F (36.7 °C)   Resp 18   Ht 5' (1.524 m)   Wt 102.1 kg (225 lb)   SpO2 100%   Breastfeeding No   BMI 43.94 kg/m²       Physical Assessment:     General: alert, cooperative, no acute distress, appears stated age. HEENT: normocephalic, no scleral icterus, moist mucous membranes, EOMs intact, no neck masses noted. Respiratory: lungs clear to auscultation bilaterally. Cardiovascular: regular rate and rhythm, no murmurs, rubs or gallops. Abdomen: normal bowel sounds, soft, non-tender to palpation. Extremities: no lower extremity edema, no cyanosis or clubbing. Neuro: alert and oriented x 3; non-focal exam.  Skin: no rashes. Psych: normal mood and affect. Madhavi Agudelo MD  [facility]  [email or web address]  Phone: [phone]  Pager: [phone]  [email or web address]

## 2021-12-07 NOTE — DISCHARGE INSTRUCTIONS
Patient Education        Upper GI Endoscopy: What to Expect at [address] had an upper GI endoscopy. Your doctor used a thin, lighted tube that bends to look at the inside of your esophagus, your stomach, and the first part of the small intestine, called the duodenum. After you have an endoscopy, you will stay at the hospital or clinic for 1 to 2 hours. This will allow the medicine to wear off. You will be able to go home after your doctor or nurse checks to make sure that you're not having any problems. You may have to stay overnight if you had treatment during the test. You may have a sore throat for a day or two after the test.  This care sheet gives you a general idea about what to expect after the test.  How can you care for yourself at home? Activity   · Rest as much as you need to after you go home. · You should be able to go back to your usual activities the day after the test.  Diet   · Follow your doctor's directions for eating after the test.  · Drink plenty of fluids (unless your doctor has told you not to). Medications   · If you have a sore throat the day after the test, use an over-the-counter spray to numb your throat. Follow-up care is a key part of your treatment and safety. Be sure to make and go to all appointments, and call your doctor if you are having problems. It's also a good idea to know your test results and keep a list of the medicines you take. When should you call for help? Call 911 anytime you think you may need emergency care. For example, call if:    · You passed out (lost consciousness).     · You have trouble breathing.     · You pass maroon or bloody stools.    Call your doctor now or seek immediate medical care if:    · You have pain that does not get better after your take pain medicine.     · You have new or worse belly pain.     · You have blood in your stools.     · You are sick to your stomach and cannot keep fluids down.     · You have a fever.     · You cannot pass stools or gas. Watch closely for changes in your health, and be sure to contact your doctor if:    · Your throat still hurts after a day or two.     · You do not get better as expected. Where can you learn more? Go to http://[email or web address].com/  Enter J454 in the search box to learn more about \"Upper GI Endoscopy: What to Expect at Home. \"  Current as of: February 10, 2021               Content Version: 13.0  © 3075-9020 [employer]. Care instructions adapted under license by [employer] (which disclaims liability or warranty for this information). If you have questions about a medical condition or this instruction, always ask your healthcare professional. [place] [address] any warranty or liability for your use of this information. DISCHARGE SUMMARY from Nurse    PATIENT INSTRUCTIONS:    After general anesthesia or intravenous sedation, for 24 hours or while taking prescription Narcotics:  · Limit your activities  · Do not drive and operate hazardous machinery  · Do not make important personal or business decisions  · Do  not drink alcoholic beverages  · If you have not urinated within 8 hours after discharge, please contact your surgeon on call. Report the following to your surgeon:  · Excessive pain, swelling, redness or odor of or around the surgical area  · Temperature over 100.5  · Nausea and vomiting lasting longer than 4 hours or if unable to take medications  · Any signs of decreased circulation or nerve impairment to extremity: change in color, persistent  numbness, tingling, coldness or increase pain  · Any questions    These are general instructions for a healthy lifestyle:    No smoking/ No tobacco products/ Avoid exposure to second hand smoke  Surgeon General's Warning:  Quitting smoking now greatly reduces serious risk to your health.     Obesity, smoking, and sedentary lifestyle greatly increases your risk for illness    A healthy diet, regular physical exercise & weight monitoring are important for maintaining a healthy lifestyle    You may be retaining fluid if you have a history of heart failure or if you experience any of the following symptoms:  Weight gain of 3 pounds or more overnight or 5 pounds in a week, increased swelling in our hands or feet or shortness of breath while lying flat in bed. Please call your doctor as soon as you notice any of these symptoms; do not wait until your next office visit. The discharge information has been reviewed with the patient. The patient verbalized understanding. Discharge medications reviewed with the patient and appropriate educational materials and side effects teaching were provided.   ___________________________________________________________________________________________________________________________________

## 2021-12-07 NOTE — PROCEDURES
[email or web address]  [place], [address]      Brief Procedure Note    Janina Ravencliff  1975  [id number]    Date of Procedure: 12/7/2021    Preoperative diagnosis: History of adenomatous colonic polyps:  V12.72 - Z86.010  Colorectal cancer surveillance:  V76.51  Chronic idiopathic constipation:  564.00 - K59.00  Chronic right abdominal wall pain:  R10.9  History of kidney cancer:  Z85.528  Encounter for screening for other viral diseases:  V73.0 - Z11.59  Atypical chest pain:  R07.89  Acquired absence of kidney:  Z90.5  BMI 40.0 - 42.9, adult:  Z68.41  Anxiety disorder:  f41.9    Postoperative diagnosis: non obstructive dysphagia with [address] [place] dilation, gastritis.      Type of Anesthesia: MAC (Monitored anesthesia care)    Description of findings: same as post op dx    Procedure: Procedure(s):  UPPER ENDOSCOPY / dilation, bx's    :  Dr. Jd Richard MD    Assistant(s): Endoscopy Technician-1: Francisco Montano  Endoscopy RN-1: Marek Solano RN; Rolando Tony RN    EBL:None    Specimens:   ID Type Source Tests Collected by Time Destination   1 : gastric bx's Preservative Gastric  Gilbert Joy MD 12/7/2021 1139 Pathology       Findings: See printed and scanned procedure note    Complications: None    Dr. Jd Richard MD  12/7/2021  11:49 AM

## 2021-12-07 NOTE — ANESTHESIA POSTPROCEDURE EVALUATION
Procedure(s):  UPPER ENDOSCOPY / dilation, bx's. MAC    Anesthesia Post Evaluation      Multimodal analgesia: multimodal analgesia used between 6 hours prior to anesthesia start to PACU discharge  Patient location during evaluation: PACU  Patient participation: complete - patient participated  Level of consciousness: awake and alert  Pain management: adequate  Airway patency: patent  Anesthetic complications: no  Cardiovascular status: acceptable  Respiratory status: acceptable  Hydration status: acceptable  Post anesthesia nausea and vomiting:  none  Final Post Anesthesia Temperature Assessment:  Normothermia (36.0-37.5 degrees C)      INITIAL Post-op Vital signs:   Vitals Value Taken Time   /87 12/07/21 1230   Temp     Pulse 64 12/07/21 1231   Resp 12 12/07/21 1231   SpO2 100 % 12/07/21 1231   Vitals shown include unvalidated device data.

## 2021-12-07 NOTE — ANESTHESIA PREPROCEDURE EVALUATION
Relevant Problems   No relevant active problems       Anesthetic History   No history of anesthetic complications            Review of Systems / Medical History  Patient summary reviewed and pertinent labs reviewed    Pulmonary  Within defined limits                 Neuro/Psych         Psychiatric history     Cardiovascular    Hypertension                   GI/Hepatic/Renal     GERD           Endo/Other        Morbid obesity     Other Findings              Physical Exam    Airway  Mallampati: III  TM Distance: 4 - 6 cm    Mouth opening: Normal     Cardiovascular    Rhythm: regular  Rate: normal         Dental    Dentition: Poor dentition     Pulmonary  Breath sounds clear to auscultation               Abdominal  GI exam deferred       Other Findings            Anesthetic Plan    ASA: 3  Anesthesia type: MAC          Induction: Intravenous  Anesthetic plan and risks discussed with: Patient

## 2021-12-12 ENCOUNTER — HOSPITAL ENCOUNTER (EMERGENCY)
Age: 46
Discharge: HOME | End: 2021-12-12
Attending: STUDENT IN AN ORGANIZED HEALTH CARE EDUCATION/TRAINING PROGRAM
Payer: MEDICAID

## 2021-12-12 VITALS
WEIGHT: 225 LBS | HEART RATE: 72 BPM | TEMPERATURE: 98.8 F | DIASTOLIC BLOOD PRESSURE: 96 MMHG | BODY MASS INDEX: 43.94 KG/M2 | RESPIRATION RATE: 16 BRPM | OXYGEN SATURATION: 100 % | SYSTOLIC BLOOD PRESSURE: 166 MMHG

## 2021-12-12 DIAGNOSIS — I15.0 RENOVASCULAR HYPERTENSION: Primary | ICD-10-CM

## 2021-12-12 DIAGNOSIS — R51.9 ACUTE NONINTRACTABLE HEADACHE, UNSPECIFIED HEADACHE TYPE: ICD-10-CM

## 2021-12-12 PROCEDURE — 74011250637 HC RX REV CODE- 250/637: Performed by: STUDENT IN AN ORGANIZED HEALTH CARE EDUCATION/TRAINING PROGRAM

## 2021-12-12 PROCEDURE — 99283 EMERGENCY DEPT VISIT LOW MDM: CPT

## 2021-12-12 RX ORDER — BUTALBITAL, ACETAMINOPHEN AND CAFFEINE 300; 40; 50 MG/1; MG/1; MG/1
1 CAPSULE ORAL
Qty: 10 CAPSULE | Refills: 0 | OUTPATIENT
Start: 2021-12-12 | End: 2022-04-12

## 2021-12-12 RX ORDER — HYDROXYZINE 25 MG/1
25 TABLET, FILM COATED ORAL
Status: DISCONTINUED | OUTPATIENT
Start: 2021-12-12 | End: 2021-12-12

## 2021-12-12 RX ORDER — BUTALBITAL, ACETAMINOPHEN AND CAFFEINE 300; 40; 50 MG/1; MG/1; MG/1
1 CAPSULE ORAL
Status: DISCONTINUED | OUTPATIENT
Start: 2021-12-12 | End: 2021-12-13 | Stop reason: HOSPADM

## 2021-12-12 RX ORDER — HYDROXYZINE PAMOATE 25 MG/1
25 CAPSULE ORAL
Status: COMPLETED | OUTPATIENT
Start: 2021-12-12 | End: 2021-12-12

## 2021-12-12 RX ADMIN — BUTALBITA,ACETAMINOPHEN AND CAFFEINE 1 CAPSULE: 50; 300; 40 CAPSULE ORAL at 20:28

## 2021-12-12 RX ADMIN — HYDROXYZINE PAMOATE 25 MG: 25 CAPSULE ORAL at 21:46

## 2021-12-13 NOTE — ED NOTES
Patient up for discharge. Discharge results have been reviewed with patient by the Provider. Armband removed and shredded per policy. Emphasized the importance of compliance with medications, discharge instructions, and outpatient follow up as listed. Patient discharged in stable condition with no apparent distress. Current Discharge Medication List      START taking these medications    Details   Compression Socks, Large misc 2 Each by Does Not Apply route daily. Qty: 2 Each, Refills: 0  Start date: 12/12/2021      butalbital-acetaminophen-caff (Fioricet) -40 mg per capsule Take 1 Capsule by mouth every six (6) hours as needed for Headache for up to 10 doses.   Qty: 10 Capsule, Refills: 0  Start date: 12/12/2021

## 2021-12-13 NOTE — ED PROVIDER NOTES
EMERGENCY DEPARTMENT HISTORY AND PHYSICAL EXAM      Date: 12/12/2021  Patient Name: Aydee Kat    History of Presenting Illness     Chief Complaint   Patient presents with    Hypertension    Headache       History (Context): Aydee Kat is a 48 y.o. female with a past medical history significant for ADHD, depression, CKD, schizoaffective disorder, and hypertension comes into the ED today due to hypertension and headache. Patient states while she was at home she took her blood pressure and her cuff stated a systolic greater than 410. Patient states she has been dealing with a headache over the past few days. She does admit to taking a sinus medication which includes phenylephrine. She states she took medication approximately 1 hour prior to arrival.  She states headache is located to the frontal portion of her head, described as throbbing, without alleviating or exacerbating factors. Patient admits to appropriate medication compliance and takes irbesartan for treatment of her hypertension at baseline. She describes her symptoms as mild in severity. Denies any further symptoms such as fever, chills, chest pain, dyspnea, abdominal pain, nausea, vomiting, numbness, tingling, or weakness. PCP: Lesvia Jaramillo MD    Current Facility-Administered Medications   Medication Dose Route Frequency Provider Last Rate Last Admin    butalbital-acetaminophen-caff (FIORICET) per capsule 1 Capsule  1 Capsule Oral Q4H PRN Janey Moss,          Current Outpatient Medications   Medication Sig Dispense Refill    simvastatin (ZOCOR) 20 mg tablet Take 20 mg by mouth nightly.  sertraline (ZOLOFT) 100 mg tablet Take 100 mg by mouth daily.  Linzess 290 mcg cap capsule Take 290 mcg by mouth as needed.  pantoprazole (PROTONIX) 40 mg tablet Take 40 mg by mouth daily as needed.  furosemide (LASIX) 20 mg tablet Take 20 mg by mouth.       cyanocobalamin (Vitamin B-12) 1,000 mcg tablet Take 1,000 mcg by mouth daily.  Latuda 20 mg tab tablet TAKE 1 TABLET BY MOUTH EVERY DAY WITH FOOD      hydrOXYzine pamoate (VistariL) 25 mg capsule Take 25 mg by mouth three (3) times daily as needed.  traZODone (DESYREL) 50 mg tablet Take 75 mg by mouth nightly.  lidocaine (LIDODERM) 5 % APPLY 1 PATCHES DAILY TO AFFECTED AREAS AS NEEDED FOR PAIN. 12 HOURS ON 12 HOURS OFF      irbesartan (AVAPRO) 300 mg tablet TAKE 1 TABLET BY MOUTH EVERY DAY FOR BLOOD PRESSURE      ciclopirox (LOPROX) 0.77 % topical cream APPLY TO AFFECTED AREA TWICE A DAY      tiZANidine (ZANAFLEX) 4 mg tablet TAKE 1 TABLET (4 MG) BY MOUTH EVERY 6 HOURS AS NEEDED NOT TO EXCEED 3 DOSES IN 24 HOURS      ergocalciferol (ERGOCALCIFEROL) 1,250 mcg (50,000 unit) capsule TAKE 1 CAP BY MOUTH TWICE WEEKLY      Amitiza 24 mcg capsule Take  by mouth. Indications: as needed (Patient not taking: No sig reported)      magnesium hydroxide (MILK OF MAGNESIA) 2,400 mg/10 mL susp suspension Take 15 mL by mouth daily as needed.  albuterol (PROVENTIL HFA, VENTOLIN HFA, PROAIR HFA) 90 mcg/actuation inhaler 1-2 Puffs.  benzonatate (TESSALON) 100 mg capsule Take 1 capsule 3 times daily as needed for coughing, swallow capsules whole. (Patient not taking: Reported on 12/7/2021)      cetirizine (ZYRTEC) 10 mg tablet 10 mg.      multivitamin (ONE A DAY) tablet Take 1 Tab by mouth daily.  pyridoxine HCl, vitamin B6, (VITAMIN B-6 PO) Take  by mouth daily.  fish oil-omega-3 fatty acids 340-1,000 mg capsule Take 1 Capsule by mouth daily.  FIBER, PSYLLIUM HUSK, PO Take  by mouth daily as needed.  Ascorbic Acid-Multivits-Min (EMERGEN-C) 1,000 mg pwep Take  by mouth daily.  magnesium 250 mg tab Take 400 mg by mouth daily. (Patient not taking: Reported on 12/2/2021)      zinc 50 mg tab tablet Take 50 mg by mouth daily.          Past History     Past Medical History:   Past Medical History:   Diagnosis Date    ADHD     Anxiety     Bronchitis     Cancer ([facility] [address]     kidney    Chronic kidney disease     Stage II    Depression     pt states anxiety schizoaffective ptsd    Eye twitch     Hypertension     Personal history of kidney cancer     Psychotic affective disorder ([facility] [address]     attention deficit disorder, and PTSD    Psychotic disorder ([facility] [address]     Schizoaffective disorder       Past Surgical History:  Past Surgical History:   Procedure Laterality Date    COLONOSCOPY N/A 11/9/2021    COLONOSCOPY performed by Mike Montilla MD at [facility] ENDOSCOPY    HX COLONOSCOPY      HX HYSTERECTOMY      HX NEPHRECTOMY Left     HX OOPHORECTOMY Right     HX OTHER SURGICAL      left nephrectomy       Family History:  History reviewed. No pertinent family history. Social History:   Social History     Tobacco Use    Smoking status: Former Smoker     Quit date: 11/27/2011     Years since quitting: 10.0    Smokeless tobacco: Never Used   Vaping Use    Vaping Use: Never used   Substance Use Topics    Alcohol use: Not Currently    Drug use: No       Allergies: Allergies   Allergen Reactions    Seafood Hives and Swelling    Iodine Other (comments)    Nsaids (Non-Steroidal Anti-Inflammatory Drug) Other (comments)    Shellfish Derived Angioedema       PMH, PSH, family history, social history, allergies reviewed with the patient with significant items noted above. Review of Systems   Review of Systems   Constitutional: Negative for chills and fever. HENT: Negative for sore throat. Eyes: Negative for visual disturbance. Respiratory: Negative for shortness of breath. Cardiovascular: Negative for chest pain. Gastrointestinal: Negative for abdominal pain, nausea and vomiting. Genitourinary: Negative for difficulty urinating. Musculoskeletal: Negative for myalgias. Skin: Negative for rash. Neurological: Positive for headaches. Negative for weakness and numbness.        Physical Exam     Vitals:    12/12/21 2017   BP: (!) 166/102   Pulse: 72   Resp: 16   Temp: 98.8 °F (37.1 °C)   SpO2: 100%   Weight: 102.1 kg (225 lb)       Physical Exam  Vitals and nursing note reviewed. Constitutional:       General: She is not in acute distress. Appearance: Normal appearance. She is not ill-appearing or toxic-appearing. HENT:      Head: Normocephalic and atraumatic. Mouth/Throat:      Mouth: Mucous membranes are moist.   Eyes:      General: No scleral icterus. Conjunctiva/sclera: Conjunctivae normal.   Cardiovascular:      Rate and Rhythm: Normal rate and regular rhythm. Comments: Normal peripheral perfusion  Pulmonary:      Effort: Pulmonary effort is normal. No respiratory distress. Abdominal:      General: There is no distension. Palpations: Abdomen is soft. Tenderness: There is no abdominal tenderness. Musculoskeletal:         General: No deformity. Normal range of motion. Cervical back: Normal range of motion and neck supple. Right lower leg: No edema. Left lower leg: Edema (Minimal left lower extremity nonpitting edema when compared to the right) present. Skin:     General: Skin is warm and dry. Findings: No rash. Neurological:      General: No focal deficit present. Mental Status: She is alert and oriented to person, place, and time. Mental status is at baseline. Cranial Nerves: No cranial nerve deficit. Sensory: No sensory deficit. Motor: No weakness. Gait: Gait normal.   Psychiatric:         Mood and Affect: Mood normal.         Thought Content: Thought content normal.         Diagnostic Study Results     Labs -   No results found for this or any previous visit (from the past 12 hour(s)). Labs Reviewed - No data to display    Radiologic Studies -   No orders to display     CT Results  (Last 48 hours)    None        CXR Results  (Last 48 hours)    None          The laboratory results, imaging results, and other diagnostic exams were reviewed in the EMR.     Medical Decision Making   I am the first provider for this patient. I reviewed the vital signs, available nursing notes, past medical history, past surgical history, family history and social history. Vital Signs-Reviewed the patient's vital signs. Records Reviewed: Personally, on initial evaluation    MDM:   Jennifer Carr presents with complaint of hypertension and headache  DDX includes but is not limited to: Essential hypertension, medical screening exam, headache    Patient overall well-appearing, no acute distress, and vital signs grossly within normal limits with exception to mild elevation of her systolic blood pressure. Do not feel patient would benefit from lab work or imaging at this time for her symptoms. Patient is scheduled to have lab work obtained tomorrow as well as a PCP appointment within a week. Will provide patient with her home dose irbesartan while here in the emergency department as well as Fioricet for treatment of her symptoms. Will obtain a repeat blood pressure following. Will continue to monitor and evaluate patient while in the ED. Orders as below:  Orders Placed This Encounter    butalbital-acetaminophen-caff (FIORICET) per capsule 1 Capsule        ED Course:   ED Course as of 12/12/21 2134   Eleonore Lowers Dec 12, 2021   2133 States resolution of her headache. Blood pressure now in the 836-189 systolic range. Patient with appointment in the morning with her PCP. Recommended patient follow-up with her PCP for further evaluation of her hypertension and possible medical management. Will prescribe patient Fioricet for further treatment of her symptoms. Will discharge patient home with return precautions and follow-up recommendations. Patient verbalized understanding is without any further questions. [DV]      ED Course User Index  [DV] Neo Chang DO           Procedures:  Procedures        Diagnosis and Disposition     CLINICAL IMPRESSION:  No diagnosis found.   Current Discharge Medication List          Disposition: Home    Patient condition at time of disposition: Stable    DISCHARGE NOTE:   Pt has been reexamined. Patient has no new complaints, changes, or physical findings. Care plan outlined and precautions discussed. Results were reviewed with the patient. All medications were reviewed with the patient. All of pt's questions and concerns were addressed. Alarm symptoms and return precautions associated with chief complaint and evaluation were reviewed with the patient in detail. The patient demonstrated adequate understanding. Patient was instructed to follow up with PCP, as well as strict return precautions to the ED upon further deterioration. Patient is ready to go home. The patient is happy with this plan          Dragon Disclaimer     Please note that this dictation was completed with PlayerPro, the computer voice recognition software. Quite often unanticipated grammatical, syntax, homophones, and other interpretive errors are inadvertently transcribed by the computer software. Please disregard these errors. Please excuse any errors that have escaped final proofreading. Anh RESTREPO.

## 2021-12-15 ENCOUNTER — OFFICE VISIT (OUTPATIENT)
Dept: ORTHOPEDIC SURGERY | Age: 46
End: 2021-12-15
Payer: COMMERCIAL

## 2021-12-15 VITALS
HEART RATE: 74 BPM | OXYGEN SATURATION: 96 % | HEIGHT: 61 IN | TEMPERATURE: 97.9 F | RESPIRATION RATE: 18 BRPM | WEIGHT: 228.4 LBS | BODY MASS INDEX: 43.12 KG/M2

## 2021-12-15 DIAGNOSIS — M48.02 CERVICAL STENOSIS OF SPINE: Primary | ICD-10-CM

## 2021-12-15 DIAGNOSIS — M79.18 CERVICAL MYOFASCIAL PAIN SYNDROME: ICD-10-CM

## 2021-12-15 DIAGNOSIS — M47.816 LUMBAR SPONDYLOSIS: ICD-10-CM

## 2021-12-15 PROCEDURE — 99203 OFFICE O/P NEW LOW 30 MIN: CPT | Performed by: PHYSICAL MEDICINE & REHABILITATION

## 2021-12-15 PROCEDURE — 72110 X-RAY EXAM L-2 SPINE 4/>VWS: CPT | Performed by: PHYSICAL MEDICINE & REHABILITATION

## 2021-12-15 NOTE — PATIENT INSTRUCTIONS
Neck Arthritis: Exercises  Introduction  Here are some examples of exercises for you to try. The exercises may be suggested for a condition or for rehabilitation. Start each exercise slowly. Ease off the exercises if you start to have pain. You will be told when to start these exercises and which ones will work best for you. How to do the exercises  Neck stretches to the side    1. This stretch works best if you keep your shoulder down as you lean away from it. To help you remember to do this, start by relaxing your shoulders and lightly holding on to your thighs or your chair. 2. Tilt your head toward your shoulder and hold for 15 to 30 seconds. Let the weight of your head stretch your muscles. 3. Repeat 2 to 4 times toward each shoulder. Chin tuck    1. Lie on the floor with a rolled-up towel under your neck. Your head should be touching the floor. 2. Slowly bring your chin toward your chest.  3. Hold for a count of 6, and then relax for up to 10 seconds. 4. Repeat 8 to 12 times. Active cervical rotation    1. Sit in a firm chair, or stand up straight. 2. Keeping your chin level, turn your head to the right, and hold for 15 to 30 seconds. 3. Turn your head to the left and hold for 15 to 30 seconds. 4. Repeat 2 to 4 times to each side. Shoulder blade squeeze    1. While standing, squeeze your shoulder blades together. 2. Do not raise your shoulders up as you are squeezing. 3. Hold for 6 seconds. 4. Repeat 8 to 12 times. Shoulder rolls    1. Sit comfortably with your feet shoulder-width apart. You can also do this exercise standing up. 2. Roll your shoulders up, then back, and then down in a smooth, circular motion. 3. Repeat 2 to 4 times. Follow-up care is a key part of your treatment and safety. Be sure to make and go to all appointments, and call your doctor if you are having problems. It's also a good idea to know your test results and keep a list of the medicines you take.   Where can you learn more? Go to http://www.gray.com/  Enter H448 in the search box to learn more about \"Neck Arthritis: Exercises. \"  Current as of: July 1, 2021               Content Version: 13.0  © 2006-2021 [facility]. Care instructions adapted under license by [employer] (which disclaims liability or warranty for this information). If you have questions about a medical condition or this instruction, always ask your healthcare professional. [address] any warranty or liability for your use of this information. Shoulder Blade: Exercises  Introduction  Here are some examples of exercises for you to try. The exercises may be suggested for a condition or for rehabilitation. Start each exercise slowly. Ease off the exercises if you start to have pain. You will be told when to start these exercises and which ones will work best for you. How to do the exercises  Shoulder roll    4. Stand tall with your chin slightly tucked. Imagine that a string at the top of your head is pulling you straight up. 5. Keep your arms relaxed. All motion will be in your shoulders. 6. Shrug your shoulders up toward your ears, then up and back. [place] your shoulders down and back, like you're sliding your hands down into your back pants pockets. 7. Repeat the circles at least 2 to 4 times. 8. This exercise is also helpful anytime you want to relax. Lower neck and upper back stretch    5. With your arms about shoulder height, clasp your hands in front of you. 6. Drop your chin toward your chest.  7. Reach straight forward so you are rounding your upper back. Think about pulling your shoulder blades apart. [place] Dominique feel a stretch across your upper back and shoulders. Hold for at least 6 seconds. 8. Repeat 2 to 4 times. Triceps stretch    5. Reach your arm straight up.   6. Keeping your elbow in place, bend your arm and reach your hand down behind your back.  7. With your other hand, apply gentle pressure to the bent elbow. [place] feel a stretch at the back of your upper arm and shoulder. Hold about 6 seconds. 8. Repeat 2 to 4 times with each arm. Shoulder stretch    5. Relax your shoulders. 6. Raise one arm to shoulder height, and reach it across your chest.  7. Pull the arm slightly toward you with your other arm. This will help you get a gentle stretch. Hold for about 6 seconds. 8. Repeat 2 to 4 times. Shoulder blade squeeze    4. Sit or stand up tall with your arms at your sides. 5. Keep your shoulders relaxed and down, not shrugged. 6. Squeeze your shoulder blades together. Hold for 6 seconds, then relax. 7. Repeat 8 to 12 times. Straight-arm shoulder blade squeeze    1. Sit or stand tall. Relax your shoulders. 2. With palms down, hold your elastic tubing or band straight out in front of you. 3. Start with slight tension in the tubing or band, with your hands about shoulder-width apart. 4. Slowly pull straight out to the sides, squeezing your shoulder blades together. Keep your arms straight and at shoulder height. Slowly release. 5. Repeat 8 to 12 times. Rowing    1. [place] your elastic tubing or band at about waist height. Take one end in each hand. 2. Sit or stand with your feet hip-width apart. 3. Hold your arms straight in front of you. Adjust your distance to create slight tension in the tubing or band. 4. Slightly tuck your chin. Relax your shoulders. 5. Without shrugging your shoulders, pull straight back. Your elbows will pass alongside your waist.  Pull-downs    1. [place] your elastic tubing or band in the top of a closed door. Take one end in each hand. 2. Either sit or stand, depending on what is more comfortable. If you feel unsteady, sit on a chair. 3. Start with your arms up and comfortably apart, elbows straight. There should be a slight tension in the tubing or band.   4. Slightly tuck your chin, and look straight ahead.  5. Keeping your back straight, slowly pull down and back, bending your elbows. 6. Stop where your hands are level with your chin, in a \"goalpost\" position. 7. Repeat 8 to 12 times. Chest T stretch    1. Lie on your back. Raise your knees so they are bent. Plant your feet on the floor, hip-width apart. 2. Tuck your chin, and relax your shoulders. 3. Reach your arms straight out to the sides. If you don't feel a mild stretch in your shoulders and across your chest, use a foam roll or a tightly rolled blanket under your spine, from your tailbone to your head. 4. Relax in this position for at least 15 to 30 seconds while you breathe normally. Repeat 2 to 4 times. 5. As you get used to this stretch, keep adding a little more time until you are able relax in this position for 2 or 3 minutes. When you can relax for at least 2 minutes, you only need to do the exercise 1 time per session. Chest goalpost stretch    1. Lie on your back. Raise your knees so they are bent. Plant your feet on the floor, hip-width apart. 2. Tuck your chin, and relax your shoulders. 3. Reach your arms straight out to the sides. 4. Bend your arms at the elbows, with your hands pointed toward the top of your head. Your arms should make an L on either side of your head. Your palms should be facing up. 5. If you don't feel a mild stretch in your shoulders and across your chest, use a foam roll or tightly rolled blanket under your spine, from your tailbone to your head. 6. Relax in this position for at least 15 to 30 seconds while you breathe normally. Repeat 2 to 4 times. 7. Each day you do this exercise, add a little more time until you can relax in this position for 2 or 3 minutes. When you can relax for at least 2 minutes, you only need to do the exercise 1 time per session. Follow-up care is a key part of your treatment and safety. Be sure to make and go to all appointments, and call your doctor if you are having problems. It's also a good idea to know your test results and keep a list of the medicines you take. Where can you learn more? Go to http://www.gray.com/  Enter H745 in the search box to learn more about \"Shoulder Blade: Exercises. \"  Current as of: July 1, 2021               Content Version: 13.0  © 2006-2021 [employer]. Care instructions adapted under license by [employer] (which disclaims liability or warranty for this information). If you have questions about a medical condition or this instruction, always ask your healthcare professional. [place] [address] any warranty or liability for your use of this information.

## 2021-12-15 NOTE — LETTER
12/15/2021    Patient: Alessandra Díaz   YOB: 1975   Date of Visit: 12/15/2021     Micheal Bhatti, [address] [place] [address] [address] [facility] [address]  Via Fax: [phone]    Dear Micheal Bhatti MD,      Thank you for referring Ms. Alessandra Díaz to [facility] for evaluation. My notes for this consultation are attached. If you have questions, please do not hesitate to call me. I look forward to following your patient along with you.       Sincerely,    Colletta Medico, MD
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
## 2021-12-22 ENCOUNTER — OFFICE VISIT (OUTPATIENT)
Dept: SURGERY | Age: 46
End: 2021-12-22
Payer: MEDICAID

## 2021-12-22 VITALS
HEIGHT: 61 IN | HEART RATE: 88 BPM | OXYGEN SATURATION: 99 % | SYSTOLIC BLOOD PRESSURE: 154 MMHG | WEIGHT: 223 LBS | DIASTOLIC BLOOD PRESSURE: 106 MMHG | BODY MASS INDEX: 42.1 KG/M2 | TEMPERATURE: 97.2 F

## 2021-12-22 DIAGNOSIS — Z71.3 ENCOUNTER FOR DIETARY COUNSELING AND SURVEILLANCE: Primary | ICD-10-CM

## 2021-12-22 DIAGNOSIS — Z71.3 ENCOUNTER FOR WEIGHT LOSS COUNSELING: ICD-10-CM

## 2021-12-22 DIAGNOSIS — E66.01 MORBID OBESITY (HCC): ICD-10-CM

## 2021-12-22 PROCEDURE — 99213 OFFICE O/P EST LOW 20 MIN: CPT | Performed by: NURSE PRACTITIONER

## 2021-12-22 RX ORDER — TRIAMTERENE AND HYDROCHLOROTHIAZIDE 37.5; 25 MG/1; MG/1
CAPSULE ORAL DAILY
COMMUNITY
End: 2022-04-12

## 2021-12-22 NOTE — PROGRESS NOTES
[facility] Progress Note:   F/up Provider Visit    CC: Weight Management    Cloria Cooks is a 48 y.o. female who is here for her  f/up medical provider visit for the [facility]. she did attend class last week. Pt did not take off compression socks and shoes for body composition so that was not performed this visit. Per home scale she is 222 and maintaining weight. Doing 2-3 MR daily with meal (usually salad)    EGD with Dr. Natalie Peña on 12/7 which showed nonobstructive dysphagia and was dilated, erosions, erythema and congestion in antrum and stomach body. Did you have any problems adhering to the program last week? no  If yes, please explain:     Since your last visit, have you experienced any complications? no    Have you received any other medical care this week? no  If yes, where and for what? Have you had any change in your medications since your last visit? yes  If yes what? Are you taking an appetite suppressant? no   If yes:  Do you need a refill? BP Readings from Last 3 Encounters:   12/22/21 (!) 154/106   12/12/21 (!) 166/96   12/07/21 (!) 166/99        Eating Habits Over Last Week:  Did you take in 64 oz of non-caloric fluids? yes     Did you consume your prescribed meal replacement regimen each day? yes       Physical Activity Over the Past Week:    Aerobic exercise: 300 min  Resistance exercise: 0 workouts / week      Weight History  Weight Metrics 12/22/2021 12/22/2021 12/15/2021 12/12/2021 12/7/2021 12/2/2021 11/19/2021   Weight - 223 lb 228 lb 6.4 oz 225 lb - 225 lb 222 lb   Waist Measure Inches 38 - - - - - -   Exercise Mins/week - - - - - - -   Body Fat % - - - - - - -   BMI - 42.14 kg/m2 43.16 kg/m2 43.94 kg/m2 43.94 kg/m2 - 41.95 kg/m2       Ideal body weight: 47.8 kg (105 lb 6.1 oz)  Adjusted ideal body weight: 69.1 kg (152 lb 6.8 oz)  Body mass index is 42.14 kg/m².       History    Past Medical History:   Diagnosis Date    ADHD     Anxiety     Bronchitis     Cancer ([facility] [address]     kidney    Chronic kidney disease     Stage II    Depression     pt states anxiety schizoaffective ptsd    Eye twitch     Hypertension     Personal history of kidney cancer     Psychotic affective disorder ([facility] [address]     attention deficit disorder, and PTSD    Psychotic disorder ([facility] [address]     Schizoaffective disorder       Past Surgical History:   Procedure Laterality Date    COLONOSCOPY N/A 11/9/2021    COLONOSCOPY performed by Susan Garcia MD at [address] HX COLONOSCOPY      HX HYSTERECTOMY      HX NEPHRECTOMY Left     HX OOPHORECTOMY Right     HX OTHER SURGICAL      left nephrectomy       Current Outpatient Medications   Medication Sig Dispense Refill    triamterene-hydroCHLOROthiazide (DYAZIDE) 37.5-25 mg per capsule Take  by mouth daily.  Compression Socks, Large misc 2 Each by Does Not Apply route daily. 2 Each 0    simvastatin (ZOCOR) 20 mg tablet Take 20 mg by mouth nightly.  sertraline (ZOLOFT) 100 mg tablet Take 100 mg by mouth daily.  Linzess 290 mcg cap capsule Take 290 mcg by mouth as needed.  pantoprazole (PROTONIX) 40 mg tablet Take 40 mg by mouth daily as needed.  traZODone (DESYREL) 50 mg tablet Take 75 mg by mouth nightly.  lidocaine (LIDODERM) 5 % APPLY 1 PATCHES DAILY TO AFFECTED AREAS AS NEEDED FOR PAIN. 12 HOURS ON 12 HOURS OFF      Amitiza 24 mcg capsule Take  by mouth. Indications: as needed      albuterol (PROVENTIL HFA, VENTOLIN HFA, PROAIR HFA) 90 mcg/actuation inhaler 1-2 Puffs.  butalbital-acetaminophen-caff (Fioricet) -40 mg per capsule Take 1 Capsule by mouth every six (6) hours as needed for Headache for up to 10 doses. 10 Capsule 0    cyanocobalamin (Vitamin B-12) 1,000 mcg tablet Take 1,000 mcg by mouth daily.       Latuda 20 mg tab tablet TAKE 1 TABLET BY MOUTH EVERY DAY WITH FOOD (Patient not taking: Reported on 12/22/2021)      hydrOXYzine pamoate (VistariL) 25 mg capsule Take 25 mg by mouth three (3) times daily as needed.  irbesartan (AVAPRO) 300 mg tablet TAKE 1 TABLET BY MOUTH EVERY DAY FOR BLOOD PRESSURE      ciclopirox (LOPROX) 0.77 % topical cream APPLY TO AFFECTED AREA TWICE A DAY      tiZANidine (ZANAFLEX) 4 mg tablet TAKE 1 TABLET (4 MG) BY MOUTH EVERY 6 HOURS AS NEEDED NOT TO EXCEED 3 DOSES IN 24 HOURS      ergocalciferol (ERGOCALCIFEROL) 1,250 mcg (50,000 unit) capsule TAKE 1 CAP BY MOUTH TWICE WEEKLY      magnesium hydroxide (MILK OF MAGNESIA) 2,400 mg/10 mL susp suspension Take 15 mL by mouth daily as needed.  cetirizine (ZYRTEC) 10 mg tablet 10 mg.      multivitamin (ONE A DAY) tablet Take 1 Tab by mouth daily.  pyridoxine HCl, vitamin B6, (VITAMIN B-6 PO) Take  by mouth daily.  fish oil-omega-3 fatty acids 340-1,000 mg capsule Take 1 Capsule by mouth daily.  FIBER, PSYLLIUM HUSK, PO Take  by mouth daily as needed.  Ascorbic Acid-Multivits-Min (EMERGEN-C) 1,000 mg pwep Take  by mouth daily.  magnesium 250 mg tab Take 400 mg by mouth daily. (Patient not taking: Reported on 12/2/2021)      zinc 50 mg tab tablet Take 50 mg by mouth daily. Allergies   Allergen Reactions    Seafood Hives and Swelling    Iodine Other (comments)    Nsaids (Non-Steroidal Anti-Inflammatory Drug) Other (comments)    Shellfish Derived Angioedema       Social History     Tobacco Use    Smoking status: Former Smoker     Quit date: 11/27/2011     Years since quitting: 10.0    Smokeless tobacco: Never Used   Vaping Use    Vaping Use: Never used   Substance Use Topics    Alcohol use: Not Currently    Drug use: No       No family history on file. No family status information on file. Review of Systems  Review of Systems   Constitutional: Positive for malaise/fatigue and weight loss. All other systems reviewed and are negative.       Objective  Visit Vitals  BP (!) 154/106 (BP 1 Location: Right arm, BP Patient Position: Sitting)   Pulse 88   Temp 97.2 °F (36.2 °C)   Ht 5' 1\" (1.549 m)   Wt 101.2 kg (223 lb)   SpO2 99%   BMI 42.14 kg/m²     No LMP recorded. Patient has had a hysterectomy. Physical Exam  Physical Exam  Vitals and nursing note reviewed. Constitutional:       Appearance: Normal appearance. Pulmonary:      Effort: Pulmonary effort is normal.   Musculoskeletal:         General: Normal range of motion. Skin:     General: Skin is warm and dry. Neurological:      General: No focal deficit present. Mental Status: She is alert and oriented to person, place, and time. Psychiatric:         Mood and Affect: Mood normal.         Behavior: Behavior normal.           No results found for this or any previous visit (from the past 672 hour(s)). Assessment / Plan    Encounter Diagnoses   Name Primary?  Encounter for dietary counseling and surveillance Yes    Encounter for weight loss counseling     Morbid obesity ([facility] [address]     BMI 40.0-44.9, adult [facility])      Did not get to fully review labs as pt reports she did not want to review them as it too much stimulation. Was taken off Lasix and started on Triamerene/HCTZ and taken off Lasix. She has also stopped [address]  Weight management      Today, the patient is  Height: 5' 1\" (154.9 cm) tall, Weight: 101.2 kg (223 lb) lbs for a Body mass index is 42.14 kg/m². is suffering from obesity      Degree of control: Good this month, continue with 2 MR + 1 meal   Progress was reviewed with patient. Goal(s) for next appointment:   -2 lbs    I have reviewed/discussed the above normal BMI with the patient. I have recommended the following interventions: dietary management education, guidance, and counseling, encourage exercise, monitor weight and prescribed dietary intake . Dawit Vigil       2.  Labs    Latest results reviewed with patient    Orders Placed This Encounter    triamterene-hydroCHLOROthiazide (DYAZIDE) 37.5-25 mg per capsule       The primary encounter diagnosis was Encounter for dietary counseling and surveillance. Diagnoses of Encounter for weight loss counseling, Morbid obesity ([address] and BMI 40.0-44.9, adult [facility]) were also pertinent to this visit. Follow-up and Dispositions    · Return in about 4 weeks (around 1/19/2022). A total time of 20 minutes was spent face-to-face with the patient during this encounter and over half of that time was spent on cousneling.     Nina Torrez, EDWARD

## 2022-01-11 ENCOUNTER — HOSPITAL ENCOUNTER (OUTPATIENT)
Dept: PHYSICAL THERAPY | Age: 47
Discharge: HOME | End: 2022-01-11
Attending: PHYSICAL MEDICINE & REHABILITATION

## 2022-01-11 NOTE — PROGRESS NOTES
[facility]  [place] [facility] [place]  [place]  [address] [place] [address]  [phone] fax    Plan of Care/ Statement of Necessity for Physical Therapy Services    Patient name: Radha Wynn Start of Care: 2022   Referral source: Scarlet Singleton MD : 1975    Medical Diagnosis: Neck pain [M54.2]  Lumbar pain [M54.50]  Payor: Wilda Bartlett / Plan: Sylvia Cox / Product Type: Managed Care Medicaid /  Onset Date:***    Treatment Diagnosis: ***   Prior Hospitalization: see medical history Provider#: [id number]   Medications: Verified on Patient summary List    Comorbidities: ***   Prior Level of Function: ***     The Plan of Care and following information is based on the information from the initial evaluation. Assessment/ key information: ***  Evaluation Complexity History {PT OP History :31906}; Examination {PT OP Examination :09375} ; Presentation {PT OP Presentation :62478} ; Clinical Decision Making {PT OP Decision Making :26424}  Overall Complexity Rating: {PT OP Complexity:31332}  Problem List: {BSHSI IP PROBLEM LIST:76211}   Treatment Plan may include any combination of the following: {Outpt PT Treatment GNJM:67382}  Patient / Family readiness to learn indicated by: {Outpt PT Patient Family Readiness to Learn:08023}  Persons(s) to be included in education: {IM Persons Education:77397}  Barriers to Learning/Limitations: {barriers to learning/limitations:00285}  Patient Goal (s): ***  Patient Self Reported Health Status: {Outpt PT Rehabilitation Potential:30579}  Rehabilitation Potential: {Outpt PT Rehabilitation Potential:86799}    Short Term Goals: To be accomplished in *** {BSHSI OP WEEKS/TREATMENTS:}:   ***  Long Term Goals: To be accomplished in *** {BSHSI OP WEEKS/TREATMENTS:}:   ***  Frequency / Duration: Patient to be seen *** times per week for *** {BSHSI OP WEEKS/TREATMENTS:}.     Patient/ CarPatient/ Caregiver education and instruction: Diagnosis, prognosis, {Outpt PT patient caregiver ed.:32974}   [x]  Plan of care has been reviewed with DAMON Ocampo, PT 1/11/2022 1:23 PM    ________________________________________________________________________    I certify that the above Therapy Services are being furnished while the patient is under my care. I agree with the treatment plan and certify that this therapy is necessary.     [de-identified] Signature:____________Date:_________TIME:________     Avril Mathur MD  ** Signature, Date and Time must be completed for valid certification **    Please sign and return to [facility] [address] [facility]  [address] [facility]  [phone] fax

## 2022-01-11 NOTE — PROGRESS NOTES
PT DAILY TREATMENT NOTE - [facility]     Patient Name: Karen Screen  Date:2022  : 1975  [x]  Patient  Verified  Payor: Martha De Los Santos / Plan: Lisbet Bob / Product Type: Managed Care Medicaid /    In time:***  Out time:***  Total Treatment Time (min): ***  Total Timed Codes (min): ***  1:1 Treatment Time ( only): ***   Visit #: *** of ***    Treatment Area: Neck pain [M54.2]  Lumbar pain [M54.50]    SUBJECTIVE  Pain Level (0-10 scale): ***  Any medication changes, allergies to medications, adverse drug reactions, diagnosis change, or new procedure performed?: [x] No    [] Yes (see summary sheet for update)  Subjective functional status/changes:   [] No changes reported  ***    OBJECTIVE    Modality rationale: {BSHSI INMOTION MODALITIES:23778} to improve the patients ability to ***   Min Type Additional Details    [] Estim:  []Unatt       []IFC  []Premod                        []Other:  []w/ice   []w/heat  Position:  Location:    [] Estim: []Att    []TENS instruct  []NMES                    []Other:  []w/US   []w/ice   []w/heat  Position:  Location:    []  Traction: [] Cervical       []Lumbar                       [] Prone          []Supine                       []Intermittent   []Continuous Lbs:  [] before manual  [] after manual    []  Ultrasound: []Continuous   [] Pulsed                           []1MHz   []3MHz W/cm2:  Location:    []  Iontophoresis with dexamethasone         Location: [] Take home patch   [] In clinic    []  Ice     []  heat  []  Ice massage  []  Laser   []  Anodyne Position:  Location:    []  Laser with stim  []  Other:  Position:  Location:    []  Vasopneumatic Device Pressure:       [] lo [] med [] hi   Temperature: [] lo [] med [] hi   [] Skin assessment post-treatment:  []intact []redness- no adverse reaction    []redness - adverse reaction:     *** min []Eval                  []Re-Eval       *** min Therapeutic Exercise:  [] See flow sheet :   Rationale: {BSHSI Justyna Madison MT:80706} to improve the patients ability to ***    *** min Therapeutic Activity:  []  See flow sheet :   Rationale: {BSHSI IMMOTION THER EX:51051}  to improve the patients ability to ***     *** min Neuromuscular Re-education:  []  See flow sheet :   Rationale: {BSHSI IMMOTION THER EX:44875}  to improve the patients ability to ***    *** min Manual Therapy:  ***   Rationale: {BSI IMMOTION MANUAL THERAPY:17341} to ***    *** min Gait Training:  ___ feet with ___ device on level surfaces with ___ level of assist   Rationale: With   [] TE   [] TA   [] neuro   [] other: Patient Education: [x] Review HEP    [] Progressed/Changed HEP based on:   [] positioning   [] body mechanics   [] transfers   [] heat/ice application    [] other:      Other Objective/Functional Measures: ***     Pain Level (0-10 scale) post treatment: ***    ASSESSMENT/Changes in Function: ***    Patient will continue to benefit from skilled PT services to [facility]201[facility]} to attain remaining goals.      []  See Plan of Care  []  See progress note/recertification  []  See Discharge Summary         Progress towards goals / Updated goals:  ***    PLAN  []  Upgrade activities as tolerated     []  Continue plan of care  []  Update interventions per flow sheet       []  Discharge due to:_  []  Other:_      Juan Bourne, PT 1/11/2022  1:20 PM    Future Appointments   Date Time Provider Frederic Yañezi   1/11/2022  3:00 PM Dorcas Coffman, PT [facility]   1/21/2022  1:30 PM Peace Gomez, ALISON [facility] BS AMB   2/2/2022  3:15 PM Janna Ledezma MD [facility] BS AMB

## 2022-01-20 ENCOUNTER — APPOINTMENT (OUTPATIENT)
Dept: PHYSICAL THERAPY | Age: 47
End: 2022-01-20
Attending: PHYSICAL MEDICINE & REHABILITATION

## 2022-01-24 ENCOUNTER — HOSPITAL ENCOUNTER (OUTPATIENT)
Dept: NUTRITION | Age: 47
Discharge: HOME | End: 2022-01-24
Payer: MEDICAID

## 2022-01-24 PROCEDURE — 97803 MED NUTRITION INDIV SUBSEQ: CPT

## 2022-01-26 ENCOUNTER — OFFICE VISIT (OUTPATIENT)
Dept: SURGERY | Age: 47
End: 2022-01-26
Payer: MEDICARE

## 2022-01-26 VITALS
WEIGHT: 226.8 LBS | TEMPERATURE: 97.4 F | OXYGEN SATURATION: 98 % | BODY MASS INDEX: 42.82 KG/M2 | HEIGHT: 61 IN | RESPIRATION RATE: 18 BRPM | SYSTOLIC BLOOD PRESSURE: 128 MMHG | HEART RATE: 80 BPM | DIASTOLIC BLOOD PRESSURE: 86 MMHG

## 2022-01-26 DIAGNOSIS — Z71.3 ENCOUNTER FOR WEIGHT LOSS COUNSELING: ICD-10-CM

## 2022-01-26 DIAGNOSIS — E66.01 MORBID OBESITY (HCC): ICD-10-CM

## 2022-01-26 DIAGNOSIS — Z71.3 ENCOUNTER FOR DIETARY COUNSELING AND SURVEILLANCE: Primary | ICD-10-CM

## 2022-01-26 PROCEDURE — G8432 DEP SCR NOT DOC, RNG: HCPCS | Performed by: NURSE PRACTITIONER

## 2022-01-26 PROCEDURE — G8417 CALC BMI ABV UP PARAM F/U: HCPCS | Performed by: NURSE PRACTITIONER

## 2022-01-26 PROCEDURE — 99214 OFFICE O/P EST MOD 30 MIN: CPT | Performed by: NURSE PRACTITIONER

## 2022-01-26 PROCEDURE — G8428 CUR MEDS NOT DOCUMENT: HCPCS | Performed by: NURSE PRACTITIONER

## 2022-01-26 RX ORDER — ZIPRASIDONE HYDROCHLORIDE 20 MG/1
20 CAPSULE ORAL 2 TIMES DAILY WITH MEALS
COMMUNITY
End: 2022-04-12

## 2022-01-26 NOTE — PROGRESS NOTES
Chief Complaint   Patient presents with    Weight Management     [facility] Weight Loss Program Progress Note:   F/up Provider Visit    CC: Weight Management    Soraida Villeda is a 48 y.o. female who is here for her  f/up medical provider visit for the LCD Program. she did attend class last week. +3 lbs  Reports increased portion sizes and eating due to stress. She is stressed about the covid pandemic, family issues, and changes in her medications. She is currently off Yani Gearing and was supposed to start Geodon, but is hesitant due to previous hx of palpitations. Would like to buy workout bike recommended by Norma Lira RD to increase PA.     1 MR, salads, protein and veg or soup for dinner. Did you have any problems adhering to the program last week? no  If yes, please explain:     Since your last visit, have you experienced any complications? no    Have you received any other medical care this week? yes  If yes, where and for what? [facility]    Have you had any change in your medications since your last visit? yes  If yes what?  latuda discontinued and to start on ziprasidone 20 milligram daily which she has not started yet because she states [facility] told her she had an irregular heartbeat. Are you taking an appetite suppressant? no   If yes:  Do you need a refill? BP Readings from Last 3 Encounters:   01/26/22 128/86   12/22/21 (!) 154/106   12/12/21 (!) 166/96        Eating Habits Over Last Week:  Did you take in 64 oz of non-caloric fluids? yes     Did you consume your prescribed meal replacement regimen each day?  yes       Physical Activity Over the Past Week:    Aerobic exercise: 240 min  Resistance exercise: 0 workouts / week      Weight History  Weight Metrics 1/26/2022 1/26/2022 12/22/2021 12/22/2021 12/15/2021 12/12/2021 12/7/2021   Weight - 226 lb 12.8 oz - 223 lb 228 lb 6.4 oz 225 lb -   Waist Measure Inches 37 - 38 - - - -   Exercise Mins/week - - - - - - - Body Fat % - - - - - - -   BMI - 42.85 kg/m2 - 42.14 kg/m2 43.16 kg/m2 43.94 kg/m2 43.94 kg/m2       Ideal body weight: 47.8 kg (105 lb 6.1 oz)  Adjusted ideal body weight: 69.8 kg (153 lb 15.2 oz)  Body mass index is 42.85 kg/m². History    Past Medical History:   Diagnosis Date    ADHD     Anxiety     Arrhythmia     irregular heart beat    Bronchitis     Cancer ([facility]     kidney    Chronic kidney disease     Stage II    Depression     pt states anxiety schizoaffective, ptsd,adhd,mixed receptive language disorder    Eye twitch     Hypertension     Personal history of kidney cancer     Psychotic affective disorder ([facility]     attention deficit disorder, and PTSD    Psychotic disorder ([facility]     Schizoaffective disorder       Past Surgical History:   Procedure Laterality Date    COLONOSCOPY N/A 11/9/2021    COLONOSCOPY performed by Era Rodriguez MD at [address] HX COLONOSCOPY      HX HYSTERECTOMY      HX NEPHRECTOMY Left     HX OOPHORECTOMY Right     HX OTHER SURGICAL      left nephrectomy       Current Outpatient Medications   Medication Sig Dispense Refill    ziprasidone (GEODON) 20 mg capsule Take 20 mg by mouth two (2) times daily (with meals).  triamterene-hydroCHLOROthiazide (DYAZIDE) 37.5-25 mg per capsule Take  by mouth daily.  butalbital-acetaminophen-caff (Fioricet) -40 mg per capsule Take 1 Capsule by mouth every six (6) hours as needed for Headache for up to 10 doses. 10 Capsule 0    simvastatin (ZOCOR) 20 mg tablet Take 20 mg by mouth nightly.  sertraline (ZOLOFT) 100 mg tablet Take 100 mg by mouth daily.  Linzess 290 mcg cap capsule Take 290 mcg by mouth as needed.  pantoprazole (PROTONIX) 40 mg tablet Take 40 mg by mouth daily as needed.  cyanocobalamin (Vitamin B-12) 1,000 mcg tablet Take 1,000 mcg by mouth daily.  hydrOXYzine pamoate (VistariL) 25 mg capsule Take 25 mg by mouth three (3) times daily as needed.       traZODone (DESYREL) 50 mg tablet Take 75 mg by mouth nightly.  lidocaine (LIDODERM) 5 % APPLY 1 PATCHES DAILY TO AFFECTED AREAS AS NEEDED FOR PAIN. 12 HOURS ON 12 HOURS OFF      irbesartan (AVAPRO) 300 mg tablet TAKE 1 TABLET BY MOUTH EVERY DAY FOR BLOOD PRESSURE      ciclopirox (LOPROX) 0.77 % topical cream APPLY TO AFFECTED AREA TWICE A DAY      tiZANidine (ZANAFLEX) 4 mg tablet TAKE 1 TABLET (4 MG) BY MOUTH EVERY 6 HOURS AS NEEDED NOT TO EXCEED 3 DOSES IN 24 HOURS      ergocalciferol (ERGOCALCIFEROL) 1,250 mcg (50,000 unit) capsule TAKE 1 CAP BY MOUTH TWICE WEEKLY      Amitiza 24 mcg capsule Take  by mouth. Indications: as needed      albuterol (PROVENTIL HFA, VENTOLIN HFA, PROAIR HFA) 90 mcg/actuation inhaler 1-2 Puffs.  cetirizine (ZYRTEC) 10 mg tablet 10 mg.      multivitamin (ONE A DAY) tablet Take 1 Tab by mouth daily.  pyridoxine HCl, vitamin B6, (VITAMIN B-6 PO) Take  by mouth daily.  fish oil-omega-3 fatty acids 340-1,000 mg capsule Take 1 Capsule by mouth daily.  FIBER, PSYLLIUM HUSK, PO Take  by mouth daily as needed.  Ascorbic Acid-Multivits-Min (EMERGEN-C) 1,000 mg pwep Take  by mouth daily.  zinc 50 mg tab tablet Take 50 mg by mouth daily.  Compression Socks, Large misc 2 Each by Does Not Apply route daily. 2 Each 0    Latuda 20 mg tab tablet TAKE 1 TABLET BY MOUTH EVERY DAY WITH FOOD (Patient not taking: Reported on 12/22/2021)         Allergies   Allergen Reactions    Seafood Hives and Swelling    Iodine Other (comments)    Nsaids (Non-Steroidal Anti-Inflammatory Drug) Other (comments)    Shellfish Derived Angioedema       Social History     Tobacco Use    Smoking status: Former Smoker     Quit date: 11/27/2011     Years since quitting: 10.1    Smokeless tobacco: Never Used   Vaping Use    Vaping Use: Never used   Substance Use Topics    Alcohol use: Yes     Comment: special occasion    Drug use: No       No family history on file.     No family status information on file. Review of Systems  Review of Systems   Constitutional:        Weight gain   All other systems reviewed and are negative. Objective  Visit Vitals  /86   Pulse 80   Temp 97.4 °F (36.3 °C)   Resp 18   Ht 5' 1\" (1.549 m)   Wt 102.9 kg (226 lb 12.8 oz)   SpO2 98%   BMI 42.85 kg/m²     No LMP recorded. Patient has had a hysterectomy. Physical Exam  Physical Exam  Vitals and nursing note reviewed. Constitutional:       Appearance: Normal appearance. Pulmonary:      Effort: Pulmonary effort is normal.   Musculoskeletal:         General: Normal range of motion. Skin:     General: Skin is warm and dry. Neurological:      General: No focal deficit present. Mental Status: She is alert and oriented to person, place, and time. Psychiatric:         Mood and Affect: Mood normal.         Behavior: Behavior normal.         No results found for this or any previous visit (from the past 672 hour(s)). Assessment / Plan    Encounter Diagnoses   Name Primary?  Encounter for dietary counseling and surveillance Yes    Encounter for weight loss counseling     Morbid obesity ([address]     BMI 40.0-44.9, adult ([facility]      1. Weight management      Today, the patient is  Height: 5' 1\" (154.9 cm) tall, Weight: 102.9 kg (226 lb 12.8 oz) lbs for a Body mass index is 42.85 kg/m². is suffering from morbid obesity      Degree of control: Tough month due to stressors. Will get back on track. Increase PA. 2 MR + 1 meal, watch portion sizes. Do not use food for coping strategies when stressed. Progress was reviewed with patient. Goal(s) for next appointment:   -2 lbs    I have reviewed/discussed the above normal BMI with the patient. I have recommended the following interventions: dietary management education, guidance, and counseling, encourage exercise, monitor weight and prescribed dietary intake . [facility]       The primary encounter diagnosis was Encounter for dietary counseling and surveillance. Diagnoses of Encounter for weight loss counseling, Morbid obesity ([address] and BMI 40.0-44.9, adult [facility]) were also pertinent to this visit. Vitaliy Santacruz NP    A total time of 35 minutes was spent face-to-face with the patient during this encounter and over half of that time was spent on counseling.

## 2022-01-26 NOTE — PROGRESS NOTES
NUTRITION  FOLLOW-UP TREATMENT NOTE  Patient Name: Sylvia Gaspar         Date: 2022  : 1975    YES/NO Patient  Verified  Diagnosis: Obesity      Total Treatment Time (min):   30     SUBJECTIVE/ASSESSMENT:  Patient is struggling and is wondering if one of her medications was making it worse. Her PCP has switched to another medicine. Changes in medication or medical history? Any new allergies, surgeries or procedures? YES/NO    If yes, update Summary List   None reported. Current Wt: 225# Previous Wt: 215# Wt Change: 10#     Achievement of Goals: 1. None reported for this visit. Patient Education:  [x]  Review current plan with patient   []  Other:    Handouts/  Information Provided: []  Carbohydrates  []  Protein  []  Fiber  []  Serving Sizes  []  Fluids  []  General guidelines []  Diabetes  []  Cholesterol  []  Sodium  []  SBGM  []  Food Journals  []  Others:      New Patient Goals: 1. Move more in the home. Patient may obtain an exercise bike. 2.  Patient may begin to count carbs at each meal.  RD recommended <20 grams per meal and always combined with protein and/or vegetables.      PLAN    [x]  Continue on current plan []  Follow-up PRN   []  Discharge due to :    [x]  Next appt: 2022  at  3:00 pm     Dietitian: Ruiz Zhu RD    Date: 2022

## 2022-01-27 ENCOUNTER — APPOINTMENT (OUTPATIENT)
Dept: PHYSICAL THERAPY | Age: 47
End: 2022-01-27
Attending: PHYSICAL MEDICINE & REHABILITATION

## 2022-02-02 ENCOUNTER — OFFICE VISIT (OUTPATIENT)
Dept: ORTHOPEDIC SURGERY | Age: 47
End: 2022-02-02
Payer: MEDICARE

## 2022-02-02 VITALS
TEMPERATURE: 98.6 F | OXYGEN SATURATION: 97 % | WEIGHT: 226 LBS | HEIGHT: 61 IN | BODY MASS INDEX: 42.67 KG/M2 | HEART RATE: 81 BPM

## 2022-02-02 DIAGNOSIS — M48.02 CERVICAL STENOSIS OF SPINE: ICD-10-CM

## 2022-02-02 DIAGNOSIS — M47.816 LUMBAR SPONDYLOSIS: ICD-10-CM

## 2022-02-02 DIAGNOSIS — M79.18 CERVICAL MYOFASCIAL PAIN SYNDROME: ICD-10-CM

## 2022-02-02 DIAGNOSIS — M79.18 CERVICAL MYOFASCIAL PAIN SYNDROME: Primary | ICD-10-CM

## 2022-02-02 PROCEDURE — G8427 DOCREV CUR MEDS BY ELIG CLIN: HCPCS | Performed by: PHYSICAL MEDICINE & REHABILITATION

## 2022-02-02 PROCEDURE — 99214 OFFICE O/P EST MOD 30 MIN: CPT | Performed by: PHYSICAL MEDICINE & REHABILITATION

## 2022-02-02 PROCEDURE — G8417 CALC BMI ABV UP PARAM F/U: HCPCS | Performed by: PHYSICAL MEDICINE & REHABILITATION

## 2022-02-02 PROCEDURE — G8432 DEP SCR NOT DOC, RNG: HCPCS | Performed by: PHYSICAL MEDICINE & REHABILITATION

## 2022-02-02 RX ORDER — PREGABALIN 100 MG/1
CAPSULE ORAL
Qty: 60 CAPSULE | Refills: 1 | Status: SHIPPED | OUTPATIENT
Start: 2022-02-02 | End: 2022-08-22 | Stop reason: SINTOL

## 2022-02-02 RX ORDER — BUPROPION HYDROCHLORIDE 100 MG/1
TABLET, EXTENDED RELEASE ORAL
COMMUNITY
End: 2022-03-22

## 2022-02-02 RX ORDER — QUETIAPINE FUMARATE 50 MG/1
1 TABLET, FILM COATED ORAL
COMMUNITY
End: 2022-03-22

## 2022-02-02 RX ORDER — ONDANSETRON 4 MG/1
4 TABLET, ORALLY DISINTEGRATING ORAL
COMMUNITY
Start: 2021-08-11 | End: 2022-04-12 | Stop reason: ALTCHOICE

## 2022-02-02 RX ORDER — POLYETHYLENE GLYCOL 3350, SODIUM CHLORIDE, SODIUM BICARBONATE, POTASSIUM CHLORIDE 420; 11.2; 5.72; 1.48 G/4L; G/4L; G/4L; G/4L
POWDER, FOR SOLUTION ORAL
COMMUNITY
Start: 2021-10-26 | End: 2022-03-22

## 2022-02-02 RX ORDER — LOSARTAN POTASSIUM 100 MG/1
1 TABLET ORAL
COMMUNITY
End: 2022-03-22

## 2022-02-02 RX ORDER — CICLOPIROX 80 MG/ML
SOLUTION TOPICAL DAILY
COMMUNITY

## 2022-02-02 RX ORDER — BISMUTH SUBCITRATE POTASSIUM, METRONIDAZOLE, TETRACYCLINE HYDROCHLORIDE 140; 125; 125 MG/1; MG/1; MG/1
CAPSULE ORAL
COMMUNITY
Start: 2022-02-01 | End: 2022-03-22

## 2022-02-02 RX ORDER — HYDROCHLOROTHIAZIDE 25 MG/1
TABLET ORAL
COMMUNITY
End: 2022-03-22

## 2022-02-02 RX ORDER — ESCITALOPRAM OXALATE 20 MG/1
TABLET ORAL
COMMUNITY
End: 2022-03-22

## 2022-02-02 NOTE — PROGRESS NOTES
[facility] [address]  [address], [address]  [place], [address] [place] [address] Street  Phone: [phone]  Fax: [phone]        Tomas Cramer  : 1975  PCP: Nimesh Comer MD    PROGRESS NOTE      ASSESSMENT AND PLAN    Diagnoses and all orders for this visit:    1. Cervical myofascial pain syndrome  -     XR SPINE CERV PA LAT ODONT 3 V MAX; Future  -     pregabalin (LYRICA) 100 mg capsule; One po qhs x 1 week, then one po bid thereafter    2. Cervical stenosis of spine, mild    3. Lumbar spondylosis  -     pregabalin (LYRICA) 100 mg capsule; One po qhs x 1 week, then one po bid thereafter        1. Stefanie Sarabia is a 48 y.o. female with chronic pain, neck pain, headaches, LBP.   2. Patient given prescription for cervicals XRAYs outside facility  3. Trial of Lyrica 100 mg BID. Patient will take QHS x 1 week then increase to BID. Follow-up and Dispositions    · Return in about 4 weeks (around 3/2/2022). HISTORY OF PRESENT ILLNESS      Stefanie Sarabia is a 48 y.o. female presents for follow up of neck pain. LV referred to PT, rx LSO. Since LV, patient has been treated for H. Pylori and due to see cardiology for arrhythmia, has been diagnosed with lymphedema. She reports progressive neck pain that radiates into her left arm. She has intermittent numbness and tingling in her left arm. She notes right handed weakness. LBP into hips. Migraines constantly. She uses Lidoderm patches and takes Tylenol with some benefit. Patient requesting x-rays. I reminded patient and reviewed her x-rays from last visit. Pain Assessment  2022   Location of Pain Neck;Back   Severity of Pain 5   Quality of Pain Sharp   Duration of Pain Persistent   Frequency of Pain Intermittent   Aggravating Factors Other (Comment)   Aggravating Factors Comment all the above.  depend on how she move   Limiting Behavior Yes   Relieving Factors Other (Comment)   Relieving Factors Comment lean back , support  pillow   Result of Injury No   Work-Related Injury -   Type of Injury -   Type of Injury Comment -         Investigations:   L XR AP/lat/flex/ext 4V 12/15/2021 (I personally reviewed these images): minimal scoliosis, poor study quality degenerative changes L4-sacrum   C MRI 2020: mild stenosis C4/5/6 (L > R)  Spine surgery consult: unknown     Treatments:  Physical therapy: 1/2022 - never started due to pain, 2020 [facility]   Spinal injections: no  Spinal surgery- no  Beneficial medications: Tylenol, Lidoderm patches  Failed medications: NSAIDs - renal issues     Work Status: on disability since 2019  Pertinent PMHx:  ADHD, CKD, HTN, renal cancer, PTSD, posterior cervical lymphadenopathy. PHYSICAL EXAMINATION    Visit Vitals  Pulse 81   Temp 98.6 °F (37 °C) (Temporal)   Ht 5' 1\" (1.549 m)   Wt 226 lb (102.5 kg)   SpO2 97%   BMI 42.70 kg/m²     Lying on exam table  Tender left cervical paraspinals  Trigger point left upper trap  2+ non pitting edema BLE  UE and LE strength grossly intact  Neg Logan's  Mild difficulty w/tandem gait.   DTRs hypoactive upper and lower extremities                  Written by Emma Strong, as dictated by Nabeel Sanchez MD.

## 2022-02-02 NOTE — LETTER
2/2/2022    Patient: Sakina León   YOB: 1975   Date of Visit: 2/2/2022     Daina Ramirez MD  [address] [address]  Via Fax: [phone]    Dear Daina Ramirez MD,      Thank you for referring Ms. Sakina León to [address] [place] [address] [facility] for evaluation. My notes for this consultation are attached. If you have questions, please do not hesitate to call me. I look forward to following your patient along with you.       Sincerely,    Karen Lopez MD

## 2022-02-02 NOTE — PATIENT INSTRUCTIONS
Neck Arthritis: Exercises  Introduction  Here are some examples of exercises for you to try. The exercises may be suggested for a condition or for rehabilitation. Start each exercise slowly. Ease off the exercises if you start to have pain. You will be told when to start these exercises and which ones will work best for you. How to do the exercises  Neck stretches to the side    1. This stretch works best if you keep your shoulder down as you lean away from it. To help you remember to do this, start by relaxing your shoulders and lightly holding on to your thighs or your chair. 2. Tilt your head toward your shoulder and hold for 15 to 30 seconds. Let the weight of your head stretch your muscles. 3. Repeat 2 to 4 times toward each shoulder. Chin tuck    1. Lie on the floor with a rolled-up towel under your neck. Your head should be touching the floor. 2. Slowly bring your chin toward your chest.  3. Hold for a count of 6, and then relax for up to 10 seconds. 4. Repeat 8 to 12 times. Active cervical rotation    1. Sit in a firm chair, or stand up straight. 2. Keeping your chin level, turn your head to the right, and hold for 15 to 30 seconds. 3. Turn your head to the left and hold for 15 to 30 seconds. 4. Repeat 2 to 4 times to each side. Shoulder blade squeeze    1. While standing, squeeze your shoulder blades together. 2. Do not raise your shoulders up as you are squeezing. 3. Hold for 6 seconds. 4. Repeat 8 to 12 times. Shoulder rolls    1. Sit comfortably with your feet shoulder-width apart. You can also do this exercise standing up. 2. Roll your shoulders up, then back, and then down in a smooth, circular motion. 3. Repeat 2 to 4 times. Follow-up care is a key part of your treatment and safety. Be sure to make and go to all appointments, and call your doctor if you are having problems. It's also a good idea to know your test results and keep a list of the medicines you take.   Where can you learn more? Go to http://www.gray.com/  Enter E833 in the search box to learn more about \"Neck Arthritis: Exercises. \"  Current as of: July 1, 2021               Content Version: 13.0  © 2006-2021 [employer]. Care instructions adapted under license by [employer] (which disclaims liability or warranty for this information). If you have questions about a medical condition or this instruction, always ask your healthcare professional. [address] any warranty or liability for your use of this information. Low Back Pain: Exercises  Introduction  Here are some examples of exercises for you to try. The exercises may be suggested for a condition or for rehabilitation. Start each exercise slowly. Ease off the exercises if you start to have pain. You will be told when to start these exercises and which ones will work best for you. How to do the exercises  Press-up    1. Lie on your stomach, supporting your body with your forearms. 2. Press your elbows down into the floor to raise your upper back. As you do this, relax your stomach muscles and allow your back to arch without using your back muscles. As your press up, do not let your hips or pelvis come off the floor. 3. Hold for 15 to 30 seconds, then relax. 4. Repeat 2 to 4 times. Alternate arm and leg (bird dog) exercise    Do this exercise slowly. Try to keep your body straight at all times, and do not let one hip drop lower than the other. 1. Start on the floor, on your hands and knees. 2. Tighten your belly muscles. 3. Raise one leg off the floor, and hold it straight out behind you. Be careful not to let your hip drop down, because that will twist your trunk. 4. Hold for about 6 seconds, then lower your leg and switch to the other leg. 5. Repeat 8 to 12 times on each leg. 6. Over time, work up to holding for 10 to 30 seconds each time.   7. If you feel stable and secure with your leg raised, try raising the opposite arm straight out in front of you at the same time. Knee-to-chest exercise    1. Lie on your back with your knees bent and your feet flat on the floor. 2. Bring one knee to your chest, keeping the other foot flat on the floor (or keeping the other leg straight, whichever feels better on your lower back). 3. Keep your lower back pressed to the floor. Hold for at least 15 to 30 seconds. 4. Relax, and lower the knee to the starting position. 5. Repeat with the other leg. Repeat 2 to 4 times with each leg. 6. To get more stretch, put your other leg flat on the floor while pulling your knee to your chest.  Curl-ups    1. Lie on the floor on your back with your knees bent at a 90-degree angle. Your feet should be flat on the floor, about 12 inches from your buttocks. 2. Cross your arms over your chest. If this bothers your neck, try putting your hands behind your neck (not your head), with your elbows spread apart. 3. Slowly tighten your belly muscles and raise your shoulder blades off the floor. 4. Keep your head in line with your body, and do not press your chin to your chest.  5. Hold this position for 1 or 2 seconds, then slowly lower yourself back down to the floor. 6. Repeat 8 to 12 times. Pelvic tilt exercise    1. Lie on your back with your knees bent. 2. \"Brace\" your stomach. This means to tighten your muscles by pulling in and imagining your belly button moving toward your spine. You should feel like your back is pressing to the floor and your hips and pelvis are rocking back. 3. Hold for about 6 seconds while you breathe smoothly. 4. Repeat 8 to 12 times. Heel dig bridging    1. Lie on your back with both knees bent and your ankles bent so that only your heels are digging into the floor. Your knees should be bent about 90 degrees.   2. Then push your heels into the floor, squeeze your buttocks, and lift your hips off the floor until your shoulders, hips, and knees are all in a straight line. 3. Hold for about 6 seconds as you continue to breathe normally, and then slowly lower your hips back down to the floor and rest for up to 10 seconds. 4. Do 8 to 12 repetitions. Hamstring stretch in doorway    1. Lie on your back in a doorway, with one leg through the open door. 2. Slide your leg up the wall to straighten your knee. You should feel a gentle stretch down the back of your leg. 3. Hold the stretch for at least 15 to 30 seconds. Do not arch your back, point your toes, or bend either knee. Keep one heel touching the floor and the other heel touching the wall. 4. Repeat with your other leg. 5. Do 2 to 4 times for each leg. Hip flexor stretch    1. Kneel on the floor with one knee bent and one leg behind you. Place your forward knee over your foot. Keep your other knee touching the floor. 2. Slowly push your hips forward until you feel a stretch in the upper thigh of your rear leg. 3. Hold the stretch for at least 15 to 30 seconds. Repeat with your other leg. 4. Do 2 to 4 times on each side. Wall sit    1. Stand with your back 10 to 12 inches away from a wall. 2. Lean into the wall until your back is flat against it. 3. Slowly slide down until your knees are slightly bent, pressing your lower back into the wall. 4. Hold for about 6 seconds, then slide back up the wall. 5. Repeat 8 to 12 times. Follow-up care is a key part of your treatment and safety. Be sure to make and go to all appointments, and call your doctor if you are having problems. It's also a good idea to know your test results and keep a list of the medicines you take. Where can you learn more? Go to http://www.gray.com/  Enter L680 in the search box to learn more about \"Low Back Pain: Exercises. \"  Current as of: July 1, 2021               Content Version: 13.0  © 0859-2698 [employer].    Care instructions adapted under license by [employer] (which disclaims liability or warranty for this information). If you have questions about a medical condition or this instruction, always ask your healthcare professional. [address] any warranty or liability for your use of this information.

## 2022-02-02 NOTE — PROGRESS NOTES
Joselibia Salcedo presents today for   Chief Complaint   Patient presents with    Back Pain       Is someone accompanying this pt? no    Is the patient using any DME equipment during OV? no    Depression Screening:  No flowsheet data found. Learning Assessment:  Learning Assessment 6/14/2019   PRIMARY LEARNER Patient   BARRIERS PRIMARY LEARNER NONE   PRIMARY LANGUAGE ENGLISH   LEARNER PREFERENCE PRIMARY DEMONSTRATION     LISTENING     READING   ANSWERED BY self   RELATIONSHIP SELF       Abuse Screening:  No flowsheet data found. Fall Risk  No flowsheet data found. OPIOID RISK TOOL  No flowsheet data found. Coordination of Care:  1. Have you been to the ER, urgent care clinic since your last visit? no  Hospitalized since your last visit? no    2. Have you seen or consulted any other health care providers outside of the [address] [place] [address] [place] since your last visit? no Include any pap smears or colon screening.  yes

## 2022-03-07 ENCOUNTER — HOSPITAL ENCOUNTER (OUTPATIENT)
Dept: NUTRITION | Age: 47
Discharge: HOME | End: 2022-03-07
Payer: MEDICAID

## 2022-03-07 PROCEDURE — 97803 MED NUTRITION INDIV SUBSEQ: CPT

## 2022-03-08 NOTE — PROGRESS NOTES
NUTRITION  FOLLOW-UP TREATMENT NOTE  Patient Name: Kun Mcmlilan         Date: 2022  : 1975    YES/NO Patient  Verified  Diagnosis: Obesity      Total Treatment Time (min):   30     SUBJECTIVE/ASSESSMENT:  Patient is struggling and shares that she has lymphedema. She is seeing a MD for this. She shares she has 1 [employer] meal replacement each day. Changes in medication or medical history? Any new allergies, surgeries or procedures? YES/NO    If yes, update Summary List   None reported        Current Wt: 229.8# Previous Wt: 225#Last visit Wt Change: 4.8#     Achievement of Goals: 1. Not much to report for this visit. Although patient has been riding her indoor exercise bike. Patient Education:  [x]  Review current plan with patient   []  Other:    Handouts/  Information Provided: []  Carbohydrates  []  Protein  []  Fiber  []  Serving Sizes  []  Fluids  []  General guidelines []  Diabetes  []  Cholesterol  []  Sodium  []  SBGM  []  Food Journals  []  Others:      New Patient Goals: 1. Avoid high sodium foods. 2.  Monitor carb intake.      PLAN    [x]  Continue on current plan []  Follow-up PRN   []  Discharge due to :    [x]  Next appt: 2022  at  2:00 pm     Dietitian: Nina Álvarez RD    Date: 2022

## 2022-03-18 PROBLEM — E78.5 HYPERLIPIDEMIA: Status: ACTIVE | Noted: 2018-05-29

## 2022-03-18 PROBLEM — E66.9 OBESITY (BMI 35.0-39.9 WITHOUT COMORBIDITY): Status: ACTIVE | Noted: 2018-05-29

## 2022-03-19 PROBLEM — E55.9 VITAMIN D DEFICIENCY: Status: ACTIVE | Noted: 2018-05-29

## 2022-03-20 PROBLEM — E66.01 SEVERE OBESITY (HCC): Status: ACTIVE | Noted: 2020-09-18

## 2022-03-21 ENCOUNTER — OFFICE VISIT (OUTPATIENT)
Dept: ORTHOPEDIC SURGERY | Age: 47
End: 2022-03-21
Payer: MEDICARE

## 2022-03-21 VITALS
SYSTOLIC BLOOD PRESSURE: 112 MMHG | HEART RATE: 84 BPM | OXYGEN SATURATION: 99 % | WEIGHT: 233.2 LBS | DIASTOLIC BLOOD PRESSURE: 80 MMHG | HEIGHT: 61 IN | TEMPERATURE: 97.1 F | BODY MASS INDEX: 44.03 KG/M2

## 2022-03-21 DIAGNOSIS — M47.816 LUMBAR SPONDYLOSIS: ICD-10-CM

## 2022-03-21 DIAGNOSIS — R60.0 EDEMA OF BOTH LOWER LEGS: ICD-10-CM

## 2022-03-21 DIAGNOSIS — M50.30 DDD (DEGENERATIVE DISC DISEASE), CERVICAL: Primary | ICD-10-CM

## 2022-03-21 PROCEDURE — 99214 OFFICE O/P EST MOD 30 MIN: CPT | Performed by: PHYSICAL MEDICINE & REHABILITATION

## 2022-03-21 PROCEDURE — G8427 DOCREV CUR MEDS BY ELIG CLIN: HCPCS | Performed by: PHYSICAL MEDICINE & REHABILITATION

## 2022-03-21 PROCEDURE — G8432 DEP SCR NOT DOC, RNG: HCPCS | Performed by: PHYSICAL MEDICINE & REHABILITATION

## 2022-03-21 PROCEDURE — G8417 CALC BMI ABV UP PARAM F/U: HCPCS | Performed by: PHYSICAL MEDICINE & REHABILITATION

## 2022-03-21 RX ORDER — NAPROXEN 500 MG/1
TABLET ORAL
COMMUNITY
Start: 2022-03-09 | End: 2022-09-21

## 2022-03-21 RX ORDER — METHYLPREDNISOLONE 4 MG/1
TABLET ORAL
COMMUNITY
Start: 2022-03-12 | End: 2022-08-22 | Stop reason: ALTCHOICE

## 2022-03-21 RX ORDER — TRIAMCINOLONE ACETONIDE 1 MG/G
CREAM TOPICAL
COMMUNITY
Start: 2022-03-12 | End: 2022-08-22

## 2022-03-21 NOTE — LETTER
3/22/2022    Patient: Angi Katz   YOB: 1975   Date of Visit: 3/21/2022     Tonya Haskins, [address] [address]  Via Fax: [phone]    Dear Tonya Haskins MD,      Thank you for referring Ms. Angi Katz to [address] [place] [address] [facility] for evaluation. My notes for this consultation are attached. If you have questions, please do not hesitate to call me. I look forward to following your patient along with you.       Sincerely,    Camden Garay MD

## 2022-03-21 NOTE — PROGRESS NOTES
[facility]  [address], [address]  [facility], [address]  Phone: [phone]  Fax: [phone]        Elsi Devi  : 1975  PCP: Emily Lopes MD    PROGRESS NOTE      ASSESSMENT AND PLAN    Diagnoses and all orders for this visit:    1. DDD (degenerative disc disease), cervical  -     REFERRAL TO PHYSICAL THERAPY    2. Edema of both lower legs  -     REFERRAL TO PHYSICAL THERAPY    3. Lumbar spondylosis  -     REFERRAL TO PHYSICAL THERAPY        1. Celeste Chairez is a 50 y.o. female with diffuse cervical and lumbar pain. Spine neuro intact. She is also concerned about her leg swelling, she has been prescribed support hose. She is due to follow-up with her PCP as she has a history of renal cell carcinoma and renal insufficiency. .  2. DC Lyrica, uncertain if this is the source of her hives. Hives are localized. Lyrica was beneficial for her pain, will resume if able. 3. Referral to Physical Therapy      Follow-up and Dispositions    · Return in about 6 weeks (around 2022) for PT fu. HISTORY OF PRESENT ILLNESS      Celeste Chairez is a 50 y.o. female presents for follow up of back pain. LV trial of Lyrica 100 mg BID. She reports constant back pain. Her pain is relieved when she elevates her legs. Denies numbness or tingling BLE. She also complains of neck and shoulder pain. Patient has numbness and weakness in her LUE. She states that the clumsiness in her left hand has increased and often drops objects. She has seen a vein center and was recommended for support hose. She is concerned about lymphedema.     She states that Lyrica is beneficial. She states that she had hives on her right arms and buttocks    Pain Assessment  3/21/2022   Location of Pain Neck;Back   Severity of Pain 5   Quality of Pain Aching   Duration of Pain Persistent   Frequency of Pain Constant   Aggravating Factors Other (Comment)   Aggravating Factors Comment sit to long and lay in the wrong position   Limiting Behavior Yes   Relieving Factors Other (Comment)   Relieving Factors Comment lidocaine patch, pain meds   Result of Injury No   Work-Related Injury -   Type of Injury -   Type of Injury Comment -         Investigations:   C XR 2/2022: DDD C5/6/7  L XR AP/lat/flex/ext 4V 12/15/2021 (I personally reviewed these images): minimal scoliosis, poor study quality degenerative changes L4-sacrum   C MRI 2020: mild stenosis C4/5/6 (L > R)  Spine surgery consult: unknown     Treatments:  Physical therapy: 1/2022 - never started due to pain, 2020 [facility]   Spinal injections: no  Spinal surgery- no  Beneficial medications: Tylenol, Lidoderm patches  Failed medications: NSAIDs - renal issues     Work Status: on disability since 2019  Pertinent PMHx:  ADHD, CKD, HTN, renal cancer, PTSD, posterior cervical lymphadenopathy    PHYSICAL EXAMINATION    Visit Vitals  /80 (BP 1 Location: Right upper arm, BP Patient Position: Sitting, BP Cuff Size: Large adult)   Pulse 84   Temp 97.1 °F (36.2 °C) (Temporal)   Ht 5' 1\" (1.549 m)   Wt 233 lb 3.2 oz (105.8 kg)   SpO2 99%   BMI 44.06 kg/m²       TTP C7, B/L upper traps  Negative Logan's, Tinel's  Weakness intrinsics and pinch 4/5 bilaterally  DTRs hypoactive UE  LE strength intact  SLR negative  TTP L4-5    Mild difficulty with tandem gait  1+ non pitting edema BLE          Written by Anthony Warner, as dictated by Annabel Bailey MD.

## 2022-03-21 NOTE — PROGRESS NOTES
Emily Rachel presents today for   Chief Complaint   Patient presents with    Neck Pain    Back Pain       Is someone accompanying this pt? no    Is the patient using any DME equipment during OV? no    Depression Screening:  No flowsheet data found. Learning Assessment:  Learning Assessment 6/14/2019   PRIMARY LEARNER Patient   BARRIERS PRIMARY LEARNER NONE   PRIMARY LANGUAGE ENGLISH   LEARNER PREFERENCE PRIMARY DEMONSTRATION     LISTENING     READING   ANSWERED BY self   RELATIONSHIP SELF       Coordination of Care:  1. Have you been to the ER, urgent care clinic since your last visit? Yes, now care for hives because of her nerves  Hospitalized since your last visit? no    2. Have you seen or consulted any other health care providers outside of the [address] [place] [address] since your last visit? Neurology Include any pap smears or colon screening.  no

## 2022-03-22 ENCOUNTER — TELEPHONE (OUTPATIENT)
Dept: ORTHOPEDIC SURGERY | Age: 47
End: 2022-03-22

## 2022-03-22 DIAGNOSIS — R60.0 EDEMA OF BOTH LOWER LEGS: Primary | ICD-10-CM

## 2022-03-22 DIAGNOSIS — M50.30 DDD (DEGENERATIVE DISC DISEASE), CERVICAL: ICD-10-CM

## 2022-03-22 DIAGNOSIS — M47.816 LUMBAR SPONDYLOSIS: ICD-10-CM

## 2022-03-22 NOTE — TELEPHONE ENCOUNTER
I called and spoke to [facility]. She was asked about how long the wait list would be for the pt. She states that it would be a month or so. Their current therapist is only there part time for this type of therapy. There are no other locations that could accommodate the pt at this time. Would you like to send the pt to another physical therapy company?

## 2022-03-22 NOTE — TELEPHONE ENCOUNTER
[facility] called to request we please split the patient's referral. They report there is a wait list for the lymphedema, but they can go ahead and begin treating for back pain. Please advise.      [facility] [phone]

## 2022-03-24 ENCOUNTER — TELEPHONE (OUTPATIENT)
Dept: PHYSICAL THERAPY | Age: 47
End: 2022-03-24

## 2022-03-28 ENCOUNTER — APPOINTMENT (OUTPATIENT)
Dept: PHYSICAL THERAPY | Age: 47
End: 2022-03-28
Attending: PHYSICAL MEDICINE & REHABILITATION

## 2022-03-28 ENCOUNTER — TELEPHONE (OUTPATIENT)
Dept: PHYSICAL THERAPY | Age: 47
End: 2022-03-28

## 2022-04-01 ENCOUNTER — TELEPHONE (OUTPATIENT)
Dept: ORTHOPEDIC SURGERY | Age: 47
End: 2022-04-01

## 2022-04-01 NOTE — TELEPHONE ENCOUNTER
Patient has found that [facility] in [facility] have a wait list for Lymphedema therapy - every other facility she's tried has a wait list and she is very anxious to start therapy because the compression stockings are not working. She provided a phone # for them, [phone] and is requesting we confirm that there is no wait list before we send referral there.

## 2022-04-04 ENCOUNTER — APPOINTMENT (OUTPATIENT)
Dept: PHYSICAL THERAPY | Age: 47
End: 2022-04-04
Attending: PHYSICAL MEDICINE & REHABILITATION

## 2022-04-06 NOTE — TELEPHONE ENCOUNTER
I spoke with Ms. Jessie Toribio to inform her the referrals have been faxed as requested. She expressed her understanding. See referral and media for details.

## 2022-04-12 ENCOUNTER — HOSPITAL ENCOUNTER (EMERGENCY)
Age: 47
Discharge: HOME | End: 2022-04-12
Attending: STUDENT IN AN ORGANIZED HEALTH CARE EDUCATION/TRAINING PROGRAM
Payer: MEDICARE

## 2022-04-12 VITALS
SYSTOLIC BLOOD PRESSURE: 133 MMHG | HEART RATE: 81 BPM | DIASTOLIC BLOOD PRESSURE: 99 MMHG | WEIGHT: 230 LBS | TEMPERATURE: 98.4 F | HEIGHT: 60 IN | RESPIRATION RATE: 18 BRPM | OXYGEN SATURATION: 100 % | BODY MASS INDEX: 45.16 KG/M2

## 2022-04-12 DIAGNOSIS — Z76.0 MEDICATION REFILL: Primary | ICD-10-CM

## 2022-04-12 PROCEDURE — 99283 EMERGENCY DEPT VISIT LOW MDM: CPT

## 2022-04-12 RX ORDER — IRBESARTAN 300 MG/1
300 TABLET ORAL
Qty: 30 TABLET | Refills: 0 | Status: SHIPPED | OUTPATIENT
Start: 2022-04-12 | End: 2022-04-12 | Stop reason: SDUPTHER

## 2022-04-12 RX ORDER — TRIAMTERENE AND HYDROCHLOROTHIAZIDE 37.5; 25 MG/1; MG/1
1 CAPSULE ORAL DAILY
Qty: 30 CAPSULE | Refills: 0 | Status: SHIPPED | OUTPATIENT
Start: 2022-04-12 | End: 2022-04-12 | Stop reason: SDUPTHER

## 2022-04-12 RX ORDER — ZIPRASIDONE HYDROCHLORIDE 20 MG/1
20 CAPSULE ORAL
Qty: 7 CAPSULE | Refills: 0 | Status: SHIPPED
Start: 2022-04-12 | End: 2022-08-22

## 2022-04-12 RX ORDER — IRBESARTAN 300 MG/1
300 TABLET ORAL
Qty: 30 TABLET | Refills: 0 | Status: SHIPPED | OUTPATIENT
Start: 2022-04-12

## 2022-04-12 RX ORDER — SERTRALINE HYDROCHLORIDE 100 MG/1
100 TABLET, FILM COATED ORAL DAILY
Qty: 7 TABLET | Refills: 0 | Status: SHIPPED | OUTPATIENT
Start: 2022-04-12 | End: 2022-04-12 | Stop reason: SDUPTHER

## 2022-04-12 RX ORDER — TRIAMTERENE AND HYDROCHLOROTHIAZIDE 37.5; 25 MG/1; MG/1
1 CAPSULE ORAL DAILY
Qty: 30 CAPSULE | Refills: 0 | Status: SHIPPED | OUTPATIENT
Start: 2022-04-12 | End: 2022-09-29

## 2022-04-12 RX ORDER — SERTRALINE HYDROCHLORIDE 100 MG/1
100 TABLET, FILM COATED ORAL DAILY
Qty: 7 TABLET | Refills: 0 | Status: SHIPPED | OUTPATIENT
Start: 2022-04-12

## 2022-04-12 RX ORDER — ZIPRASIDONE HYDROCHLORIDE 20 MG/1
20 CAPSULE ORAL
Qty: 7 CAPSULE | Refills: 0 | Status: SHIPPED | OUTPATIENT
Start: 2022-04-12 | End: 2022-04-12 | Stop reason: SDUPTHER

## 2022-04-12 NOTE — ED TRIAGE NOTES
Pt ambulatory to triage without assistance. Pt alert and oriented x 4. Pt request med refill on bp meds and psych meds. Pt sates the pharmacy is \"messing up her meds\". Pt tearful in triage.   nad noted

## 2022-04-12 NOTE — DISCHARGE INSTRUCTIONS
Keep the numbers for these national suicide hotlines nearby: 5-069-473-TALK (9-854.638.9325) and 4-779-WYRDBIA (5-142.341.5120). If you or someone you know  talks about suicide or feeling hopeless, get help right away.

## 2022-08-22 ENCOUNTER — OFFICE VISIT (OUTPATIENT)
Dept: ORTHOPEDIC SURGERY | Age: 47
End: 2022-08-22
Payer: MEDICARE

## 2022-08-22 VITALS
OXYGEN SATURATION: 100 % | WEIGHT: 235.2 LBS | BODY MASS INDEX: 46.17 KG/M2 | HEART RATE: 89 BPM | DIASTOLIC BLOOD PRESSURE: 82 MMHG | HEIGHT: 60 IN | SYSTOLIC BLOOD PRESSURE: 114 MMHG | TEMPERATURE: 97.2 F

## 2022-08-22 DIAGNOSIS — M54.41 CHRONIC BILATERAL LOW BACK PAIN WITH BILATERAL SCIATICA: Primary | ICD-10-CM

## 2022-08-22 DIAGNOSIS — M79.18 CERVICAL MYOFASCIAL PAIN SYNDROME: ICD-10-CM

## 2022-08-22 DIAGNOSIS — M50.30 DDD (DEGENERATIVE DISC DISEASE), CERVICAL: ICD-10-CM

## 2022-08-22 DIAGNOSIS — G89.29 CHRONIC BILATERAL LOW BACK PAIN WITH BILATERAL SCIATICA: Primary | ICD-10-CM

## 2022-08-22 DIAGNOSIS — M47.816 LUMBAR SPONDYLOSIS: ICD-10-CM

## 2022-08-22 DIAGNOSIS — M54.42 CHRONIC BILATERAL LOW BACK PAIN WITH BILATERAL SCIATICA: Primary | ICD-10-CM

## 2022-08-22 PROCEDURE — 99214 OFFICE O/P EST MOD 30 MIN: CPT | Performed by: PHYSICAL MEDICINE & REHABILITATION

## 2022-08-22 PROCEDURE — G8417 CALC BMI ABV UP PARAM F/U: HCPCS | Performed by: PHYSICAL MEDICINE & REHABILITATION

## 2022-08-22 PROCEDURE — G8432 DEP SCR NOT DOC, RNG: HCPCS | Performed by: PHYSICAL MEDICINE & REHABILITATION

## 2022-08-22 PROCEDURE — G8427 DOCREV CUR MEDS BY ELIG CLIN: HCPCS | Performed by: PHYSICAL MEDICINE & REHABILITATION

## 2022-08-22 RX ORDER — FLUCONAZOLE 100 MG/1
100 TABLET ORAL
COMMUNITY
Start: 2022-08-09

## 2022-08-22 RX ORDER — FLUTICASONE PROPIONATE 50 MCG
1 SPRAY, SUSPENSION (ML) NASAL DAILY PRN
COMMUNITY

## 2022-08-22 RX ORDER — GABAPENTIN 300 MG/1
300 CAPSULE ORAL 2 TIMES DAILY
Qty: 60 CAPSULE | Refills: 1 | Status: SHIPPED | OUTPATIENT
Start: 2022-08-22 | End: 2022-09-21 | Stop reason: SDUPTHER

## 2022-08-22 RX ORDER — QUETIAPINE FUMARATE 50 MG/1
50 TABLET, FILM COATED ORAL DAILY
COMMUNITY
Start: 2022-06-23 | End: 2022-11-03

## 2022-08-22 RX ORDER — LIDOCAINE 50 MG/G
PATCH TOPICAL
Qty: 60 EACH | Refills: 2 | Status: SHIPPED | OUTPATIENT
Start: 2022-08-22 | End: 2022-11-03 | Stop reason: SDUPTHER

## 2022-08-22 RX ORDER — BENZTROPINE MESYLATE 1 MG/1
1 TABLET ORAL
COMMUNITY
Start: 2022-08-17

## 2022-08-22 RX ORDER — BENZONATATE 100 MG/1
100 CAPSULE ORAL
COMMUNITY
Start: 2022-07-13

## 2022-08-22 NOTE — PROGRESS NOTES
[address]  [address], [address]  [place], [address] [place] [address] Street  Phone: [phone]  Fax: [phone]        Tono Louis  : 1975  PCP: Forest Galarza MD    PROGRESS NOTE      ASSESSMENT AND PLAN    Diagnoses and all orders for this visit:    1. Chronic bilateral low back pain with bilateral sciatica  -     REFERRAL TO PHYSICAL THERAPY  -     lidocaine (LIDODERM) 5 %; APPLY 1-2 PATCHES DAILY TO AFFECTED AREAS AS NEEDED FOR PAIN. 12 HOURS ON 12 HOURS OFF  -     gabapentin (NEURONTIN) 300 mg capsule; Take 1 Capsule by mouth two (2) times a day. Max Daily Amount: 600 mg.  -     MRI LUMB SPINE WO CONT; Future  -     AMB SUPPLY ORDER    2. Cervical myofascial pain syndrome  -     REFERRAL TO PHYSICAL THERAPY  -     lidocaine (LIDODERM) 5 %; APPLY 1-2 PATCHES DAILY TO AFFECTED AREAS AS NEEDED FOR PAIN. 12 HOURS ON 12 HOURS OFF  -     gabapentin (NEURONTIN) 300 mg capsule; Take 1 Capsule by mouth two (2) times a day. Max Daily Amount: 600 mg.  -     AMB SUPPLY ORDER    3. DDD (degenerative disc disease), cervical    4. Lumbar spondylosis  -     MRI LUMB SPINE WO CONT; Future      Clemencia Hoang is a 50 y.o. female with chronic neck and low back pain. She finds her low back intolerable. She wishes to stay away from medication. We will resume PT.   RF Lidoderm patches  RF Gabapentin  Referral to [facility], progress to land her balance  L MRI ordered for ongoing low back pain, possible injections. DME for E-Stim    Follow-up and Dispositions    Return in about 4 weeks (around 2022) for MRI/CT fu. HISTORY OF PRESENT ILLNESS      Clemencia Hoang is a 50 y.o. female presents for follow up of back and neck pain. LV 3/2022, referral to PT. She has been to lymphedema PT. She now has compression garments. She is due to start PT for her spine. She reports constant back pain exacerbated with sitting.  Her low back pain is alleviated when she lays down and elevates her legs. Pt notes sciatic pain in her posterior thighs and calves, exacerbated with walking. She states Gabapentin does not help much. Pt admits she does not take it every day. She wants to stay away from medication. She is uncertain why she has so much lymphedema. She has been borrowing someone's TENS unit with benefit. Pain Assessment  8/22/2022   Location of Pain Back;Neck   Severity of Pain 6   Quality of Pain Aching   Duration of Pain Persistent   Frequency of Pain Constant   Aggravating Factors Standing; Other (Comment)   Aggravating Factors Comment sitting   Limiting Behavior Some   Relieving Factors Other (Comment)   Relieving Factors Comment laying on back with legs elevated   Result of Injury No   Work-Related Injury -   Type of Injury -   Type of Injury Comment -         Investigations:   L XR 7/2022: left SIJ changes and mild disc height loss L5-S1  C XR 2/2022: DDD C5/6/7  L XR AP/lat/flex/ext 4V 12/15/2021 (I personally reviewed these images): minimal scoliosis, poor study quality degenerative changes L4-sacrum   C MRI 2020: mild stenosis C4/5/6 (L > R)  Spine surgery consult: unknown     Treatments:  Physical therapy: 1/2022 - never started due to pain, 2020 [facility] PT   Spinal injections: no  Spinal surgery- no  Beneficial medications: Tylenol, Lidoderm patches  Failed medications: NSAIDs - renal issues     Work Status: on disability since 2019  Pertinent PMHx:  ADHD, CKD, HTN, renal cancer, PTSD, posterior cervical lymphadenopathy, ESR 55 6/2022, CODI negative 6/2022     PHYSICAL EXAMINATION    Visit Vitals  /82 (BP 1 Location: Right arm, BP Patient Position: Sitting)   Pulse 89   Temp 97.2 °F (36.2 °C) (Temporal)   Ht 5' (1.524 m)   Wt 235 lb 3.2 oz (106.7 kg)   SpO2 100%   BMI 45.93 kg/m²       Middle-aged female lying on exam table with feet towards ceiling   TTP L4-5  Unable to do tandem gait due to back pain  Lower extremity strength intact  Straight leg raise negative  Negative Sajan's              Written by Chau Landry, as dictated by Zohra Alaniz MD.

## 2022-08-22 NOTE — LETTER
8/24/2022    Patient: Martha Chopra   YOB: 1975   Date of Visit: 8/22/2022     Shreyas Rain, [address] [facility] [address]  Via Fax: [phone]    Dear Shreyas Rain MD,      Thank you for referring Ms. Martha Chopra to [address] [place] [address] [facility] for evaluation. My notes for this consultation are attached. If you have questions, please do not hesitate to call me. I look forward to following your patient along with you.       Sincerely,    James Abraham MD

## 2022-08-22 NOTE — PROGRESS NOTES
Shazia Fuentes presents today for   Chief Complaint   Patient presents with    Back Pain    Neck Pain       Is someone accompanying this pt? no    Is the patient using any DME equipment during OV? no    Depression Screening:  No flowsheet data found. Learning Assessment:  Learning Assessment 6/14/2019   PRIMARY LEARNER Patient   BARRIERS PRIMARY LEARNER NONE   PRIMARY LANGUAGE ENGLISH   LEARNER PREFERENCE PRIMARY DEMONSTRATION     LISTENING     READING   ANSWERED BY self   RELATIONSHIP SELF       Abuse Screening:  No flowsheet data found. Fall Risk  No flowsheet data found. OPIOID RISK TOOL  No flowsheet data found. Coordination of Care:  1. Have you been to the ER, urgent care clinic since your last visit? Yes, for covid and a yeast infection. Hospitalized since your last visit? no    2. Have you seen or consulted any other health care providers outside of the [address] [place] [address] since your last visit? Yes, PCP and PT. Include any pap smears or colon screening.  no

## 2022-08-24 ENCOUNTER — HOSPITAL ENCOUNTER (OUTPATIENT)
Dept: PHYSICAL THERAPY | Age: 47
Discharge: HOME | End: 2022-08-24
Payer: MEDICARE

## 2022-08-24 PROCEDURE — 97162 PT EVAL MOD COMPLEX 30 MIN: CPT

## 2022-08-24 NOTE — PROGRESS NOTES
[facility] - [facility], [address] [place] [address]  [phone] fax  Plan of Care/ Statement of Necessity for Physical Therapy Services    Patient name: Ulla Bosworth Start of Care: 2022   Referral source: Lui Flower MD : 1975    Medical Diagnosis: Cervicalgia [M54.2]  Other low back pain [M54.59]  Payor: [facility] / Plan: [employer] / Product Type: Managed Care Medicare /  Onset Date:22 (script)    Treatment Diagnosis: Neck, Low Back pain   Prior Hospitalization: see medical history Provider#: [id number]   Medications: Verified on Patient summary List    Comorbidities: Arthritis; Asthma; Back pain - scoliosis; hx CA - in remission; Depression; HTN; Schizoaffective disorder; PTSD; ADHD; Lymphedema LEs - finished treatment at [facility] facility   Prior Level of Function: Not working, lives in duplex apt alone; The Plan of Care and following information is based on the information from the initial evaluation. Assessment/ key information: Pt is a 50 y.o. 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 Functional deficits include: inability to sit upright - must recline - or stand erect from pain, difficulty twisting to either side, affecting ability to perform toileting, takes longer to perform self-care tasks of bathing, dressing, is limited to 5 minutes of standing/amb activities, and reports disrupted sleep from pain. Upon exam, Pt exhibited FWD trunk posture; poor core activation/awareness, impaired B hip/knee strength, hip flexor inflexibility, reproducing LBP, and gait deviations. Pt would benefit from skilled PT in aquatic setting to address above deficits to improve Pt's functional mobility and strength for ease of ADLs with less pain.     Evaluation Complexity History MEDIUM  Complexity : 1-2 comorbidities / personal factors will impact the outcome/ POC ; Examination MEDIUM Complexity : 3 Standardized tests and measures addressing body structure, function, activity limitation and / or participation in recreation  ;Presentation MEDIUM Complexity : Evolving with changing characteristics  ; Clinical Decision Making MEDIUM Complexity : FOTO score of 26-74  Overall Complexity Rating: MEDIUM  Problem List: pain affecting function, decrease ROM, decrease strength, edema affecting function, impaired gait/ balance, decrease ADL/ functional abilitiies, decrease activity tolerance, decrease flexibility/ joint mobility, and decrease transfer abilities   Treatment Plan may include any combination of the following: Therapeutic exercise, Therapeutic activities, Neuromuscular re-education, Physical agent/modality, Gait/balance training, Manual therapy, Aquatic therapy, Patient education, Functional mobility training, and Stair training  Patient / Family readiness to learn indicated by: asking questions, trying to perform skills, and interest  Persons(s) to be included in education: patient (P)  Barriers to Learning/Limitations: None  Patient Goal (s): Stamina, balance, strength, less pain, better sleep.   Patient Self Reported Health Status: poor  Rehabilitation Potential: good    Short Term Goals: To be accomplished in 1 weeks:  Goal: Pt to be compliant with initial HEP to improve hip mobility, postural positioning, core activation. Status at last note/certification: Established and reviewed with Pt  Goal: Pt to initiate aquatics program without increased pain to aid in achievement of all LTGs. Status at last note/certification: N/A  Long Term Goals: To be accomplished in 5 weeks:  Goal: Pt to be able to stand erect consistently during therapy sessions without pain to reduce strain to low back with standing/amb activities.   Status at last note/certification: FWD trunk posture with all standing/amb with pain  Goal: Pt to increase B hip/knee strength to 4/5 grossly to increase ease of transfers and performance of ADLs. Status at last note/certification: 3/5 grossly B hip/knee strength  Goal: Pt to increase standing/amb tolerance to 15 minutes to increase ease of meal preparation, performance of light household chores. Status at last note/certification: 5 minute tolerance  Goal: Pt to report < 4/10 pain at worst to increase ease with ADLs. Status at last note/certification: 2/66 pain at worst  Goal: Pt to report FOTO score of 39 pts to show improved function and quality of life. Status at last note/certification: FOTO 25 pts     Frequency / Duration: Patient to be seen 2 times per week for 5 weeks. Patient/ Caregiver education and instruction: Diagnosis, prognosis, exercises   [x]  Plan of care has been reviewed with PTA    Certification Period: 8/24/22 - 9/22/22  Lalita Rai, PT 8/24/2022 5:14 PM  _____________________________________________________________________  I certify that the above Therapy Services are being furnished while the patient is under my care. I agree with the treatment plan and certify that this therapy is necessary.     [de-identified] Signature:____________Date:_________TIME:________     Lui Flower MD  ** Signature, Date and Time must be completed for valid certification **    Please sign and return to [facility] - [address]  [phone] fax
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
## 2022-08-25 ENCOUNTER — TELEPHONE (OUTPATIENT)
Dept: ORTHOPEDIC SURGERY | Age: 47
End: 2022-08-25

## 2022-08-25 ENCOUNTER — HOSPITAL ENCOUNTER (OUTPATIENT)
Dept: PHYSICAL THERAPY | Age: 47
Discharge: HOME | End: 2022-08-25
Payer: MEDICARE

## 2022-08-25 PROCEDURE — 97113 AQUATIC THERAPY/EXERCISES: CPT

## 2022-08-25 NOTE — TELEPHONE ENCOUNTER
The pt's order for estim, last office note and demographics were faxed to [facility] for review. A fax confirmation was received and they will contact the pt regarding the order. Order form sent to scanning dept.

## 2022-08-30 ENCOUNTER — HOSPITAL ENCOUNTER (OUTPATIENT)
Dept: PHYSICAL THERAPY | Age: 47
Discharge: HOME | End: 2022-08-30
Payer: MEDICARE

## 2022-08-30 PROCEDURE — 97113 AQUATIC THERAPY/EXERCISES: CPT

## 2022-09-05 DIAGNOSIS — M54.42 CHRONIC BILATERAL LOW BACK PAIN WITH BILATERAL SCIATICA: ICD-10-CM

## 2022-09-05 DIAGNOSIS — M54.41 CHRONIC BILATERAL LOW BACK PAIN WITH BILATERAL SCIATICA: ICD-10-CM

## 2022-09-05 DIAGNOSIS — G89.29 CHRONIC BILATERAL LOW BACK PAIN WITH BILATERAL SCIATICA: ICD-10-CM

## 2022-09-05 DIAGNOSIS — M47.816 LUMBAR SPONDYLOSIS: ICD-10-CM

## 2022-09-13 ENCOUNTER — HOSPITAL ENCOUNTER (OUTPATIENT)
Dept: PHYSICAL THERAPY | Age: 47
Discharge: HOME | End: 2022-09-13
Payer: MEDICARE

## 2022-09-13 PROCEDURE — 97113 AQUATIC THERAPY/EXERCISES: CPT

## 2022-09-15 ENCOUNTER — HOSPITAL ENCOUNTER (OUTPATIENT)
Dept: PHYSICAL THERAPY | Age: 47
Discharge: HOME | End: 2022-09-15
Payer: MEDICARE

## 2022-09-15 PROCEDURE — 97113 AQUATIC THERAPY/EXERCISES: CPT

## 2022-09-20 ENCOUNTER — HOSPITAL ENCOUNTER (OUTPATIENT)
Dept: PHYSICAL THERAPY | Age: 47
Discharge: HOME | End: 2022-09-20
Payer: MEDICARE

## 2022-09-20 PROCEDURE — 97113 AQUATIC THERAPY/EXERCISES: CPT

## 2022-09-21 ENCOUNTER — OFFICE VISIT (OUTPATIENT)
Dept: ORTHOPEDIC SURGERY | Age: 47
End: 2022-09-21
Payer: MEDICARE

## 2022-09-21 VITALS
HEIGHT: 60 IN | HEART RATE: 79 BPM | OXYGEN SATURATION: 100 % | WEIGHT: 232 LBS | TEMPERATURE: 97.9 F | SYSTOLIC BLOOD PRESSURE: 109 MMHG | DIASTOLIC BLOOD PRESSURE: 77 MMHG | BODY MASS INDEX: 45.55 KG/M2

## 2022-09-21 DIAGNOSIS — M54.41 CHRONIC BILATERAL LOW BACK PAIN WITH RIGHT-SIDED SCIATICA: Primary | ICD-10-CM

## 2022-09-21 DIAGNOSIS — M79.18 CERVICAL MYOFASCIAL PAIN SYNDROME: ICD-10-CM

## 2022-09-21 DIAGNOSIS — G89.29 CHRONIC BILATERAL LOW BACK PAIN WITH RIGHT-SIDED SCIATICA: Primary | ICD-10-CM

## 2022-09-21 PROCEDURE — G8432 DEP SCR NOT DOC, RNG: HCPCS | Performed by: PHYSICAL MEDICINE & REHABILITATION

## 2022-09-21 PROCEDURE — 99214 OFFICE O/P EST MOD 30 MIN: CPT | Performed by: PHYSICAL MEDICINE & REHABILITATION

## 2022-09-21 PROCEDURE — G8417 CALC BMI ABV UP PARAM F/U: HCPCS | Performed by: PHYSICAL MEDICINE & REHABILITATION

## 2022-09-21 PROCEDURE — G8427 DOCREV CUR MEDS BY ELIG CLIN: HCPCS | Performed by: PHYSICAL MEDICINE & REHABILITATION

## 2022-09-21 RX ORDER — GABAPENTIN 300 MG/1
300 CAPSULE ORAL 2 TIMES DAILY
Qty: 180 CAPSULE | Refills: 0 | Status: SHIPPED | OUTPATIENT
Start: 2022-09-21 | End: 2022-11-03 | Stop reason: SDUPTHER

## 2022-09-21 NOTE — LETTER
9/23/2022    Patient: Megan Mustafa   YOB: 1975   Date of Visit: 9/21/2022     Kodak Huang, [address] [address] [address] [facility] [address]  Via Fax: [phone]    Dear Kodak Huang MD,      Thank you for referring Ms. Megan Mustafa to [address] [place] [address] [facility] for evaluation. My notes for this consultation are attached. If you have questions, please do not hesitate to call me. I look forward to following your patient along with you.       Sincerely,    Nghia Barbour MD

## 2022-09-21 NOTE — PROGRESS NOTES
River Carpenter presents today for   Chief Complaint   Patient presents with    Back Pain     Lower         Is someone accompanying this pt? no    Is the patient using any DME equipment during OV? no    Learning Assessment:  Learning Assessment 6/14/2019   PRIMARY LEARNER Patient   BARRIERS PRIMARY LEARNER NONE   PRIMARY LANGUAGE ENGLISH   LEARNER PREFERENCE PRIMARY DEMONSTRATION     LISTENING     READING   ANSWERED BY self   RELATIONSHIP SELF       Coordination of Care:  1. Have you been to the ER, urgent care clinic since your last visit? no  Hospitalized since your last visit? no    2. Have you seen or consulted any other health care providers outside of the [address] [place] [address] since your last visit? Yes, GI and foot doctor Include any pap smears or colon screening.  no

## 2022-09-21 NOTE — PROGRESS NOTES
[address]  [address], [address]  [place], [address] [place] [address] Street  Phone: [phone]  Fax: [phone]        Emile Sandhoff  : 1975  PCP: Kandace Moreno MD    PROGRESS NOTE      ASSESSMENT AND PLAN    Diagnoses and all orders for this visit:    1. Chronic bilateral low back pain with right-sided sciatica  -     gabapentin (NEURONTIN) 300 mg capsule; Take 1 Capsule by mouth two (2) times a day. Max Daily Amount: 600 mg.    2. Cervical myofascial pain syndrome  -     gabapentin (NEURONTIN) 300 mg capsule; Take 1 Capsule by mouth two (2) times a day. Max Daily Amount: 600 mg. Brooklyn Potts is a 50 y.o. female with chronic low back pain and sciatica. She has been making gains in physical therapy. She will require a lot of postural reeducation. .   Continue Lidoderm patches  RF Gabapentin, over time she may be able to tolerate twice daily dosing. Continue daily at present  Continue using E-Stim unit  Continue PT  Advised to continue HEP as tolerated. Patient reports that she has the lumbar MRI report. She will drop this off for my review. Follow-up and Dispositions    Return in about 4 weeks (around 10/19/2022) for ok for VV. HISTORY OF PRESENT ILLNESS      Brooklyn Potts is a 50 y.o. female presents for follow up of back pain. LV referred to PT, ordered E-Stim unit, ordered L MRI. Patient reports she had her lumbar MRI done over the summer at [facility]. I see no evidence of this in the EMR. MRI of the head is noted-which was negative. She reports constant back pain radiating into her right posterior thigh and calf. Her pain is exacerbated with sitting. Denies numbness or tingling BLE. Pt gets spasms in her the left lower back. Reports she has to lay down in a chair and put her feet up towards the head rest.    Pt was taking Gabapentin 300 mg BID with benefit, though it promotes drowsiness.  Pt switched to Gabapentin 300 mg once a day, which takes the edge off. She uses Lidoderm patches and the E-Stim unit with benefit. Pt is unsure if her insurance is going to cover the E-Stim unit cost. Pt is in physical therapy with benefit. Asking about inversion table. Pain Assessment  9/21/2022   Location of Pain Back   Severity of Pain 6   Quality of Pain Other (Comment); Aching   Quality of Pain Comment muscle spasm, lety horse. pressure   Duration of Pain Persistent   Frequency of Pain Constant   Aggravating Factors Standing;Walking; Other (Comment)   Aggravating Factors Comment sitting too long, turn a certain way when I sleep   Limiting Behavior Yes   Relieving Factors Other (Comment)   Relieving Factors Comment sitting upside down, stretching legs over head   Result of Injury No   Work-Related Injury -   Type of Injury -   Type of Injury Comment -         Investigations:   L XR 7/2022: left SIJ changes and mild disc height loss L5-S1  C XR 2/2022: DDD C5/6/7  C MRI 2020: mild stenosis C4/5/6 (L > R)  Spine surgery consult: unknown     Treatments:  Physical therapy: 9/2022 for neck/LB with benefit, 1/2022 - never started due to pain, 2020 aqua PT   Spinal injections: no  Spinal surgery- no  Beneficial medications: Tylenol, Lidoderm patches, Gabapentin - helps but promotes somnolence  Failed medications: NSAIDs - renal issues     Work Status: on disability since 2019  Pertinent PMHx:  ADHD, CKD, HTN, renal cancer, PTSD, posterior cervical lymphadenopathy, ESR 55 6/2022, CODI negative 6/2022     PHYSICAL EXAMINATION    Visit Vitals  /77 (BP 1 Location: Left upper arm, BP Patient Position: Sitting, BP Cuff Size: Large adult)   Pulse 79   Temp 97.9 °F (36.6 °C) (Oral)   Ht 5' (1.524 m)   Wt 232 lb (105.2 kg)   SpO2 100% Comment: RA   BMI 45.31 kg/m²       TTP light palpation left lumbar paraspinals, L4-5, right trochanteric bursa  Ambulation FWB, non antalgic without any assistive device. No foot drop.   Difficulty standing at neutral  Prefers to lean over in exam table.               Written by Mayelin Rodriges, as dictated by Bhavesh Chapman MD.

## 2022-09-28 ENCOUNTER — HOSPITAL ENCOUNTER (OUTPATIENT)
Age: 47
Discharge: HOME | End: 2022-09-28
Attending: PHYSICAL MEDICINE & REHABILITATION
Payer: MEDICARE

## 2022-09-28 PROCEDURE — 72148 MRI LUMBAR SPINE W/O DYE: CPT

## 2022-09-29 ENCOUNTER — HOSPITAL ENCOUNTER (OUTPATIENT)
Dept: PHYSICAL THERAPY | Age: 47
Discharge: HOME | End: 2022-09-29
Payer: MEDICARE

## 2022-09-29 PROCEDURE — 97113 AQUATIC THERAPY/EXERCISES: CPT

## 2022-09-29 RX ORDER — TRIAMTERENE/HYDROCHLOROTHIAZID 37.5-25 MG
1 TABLET ORAL DAILY
COMMUNITY

## 2022-09-29 RX ORDER — CETIRIZINE HCL 10 MG
10 TABLET ORAL
COMMUNITY

## 2022-09-29 RX ORDER — ASPIRIN 325 MG
50000 TABLET, DELAYED RELEASE (ENTERIC COATED) ORAL
COMMUNITY

## 2022-09-29 RX ORDER — ACETAMINOPHEN 500 MG
1000 TABLET ORAL
COMMUNITY

## 2022-09-29 RX ORDER — TRAZODONE HYDROCHLORIDE 50 MG/1
50 TABLET ORAL
COMMUNITY

## 2022-09-29 NOTE — PROGRESS NOTES
[facility] - [facility], [address]  [phone] fax    Continued Plan of Care/ Re-certification for Physical Therapy Services      Patient name: Josh Álvarez Start of Care:  2022   Referral source: Avril Mathur MD : 1975   Medical/Treatment Diagnosis: Cervicalgia [M54.2]  Other low back pain [M54.59]  Payor: [facility] / Plan: [employer] / Product Type: Managed Care Medicare /  Onset Date:  22 (script)   Prior Hospitalization: see medical history Provider#: [id number]   Medications: Verified on Patient Summary List    Comorbidities:  Arthritis; Asthma; Back pain - scoliosis; hx CA - in remission; Depression; HTN; Schizoaffective disorder; PTSD; ADHD; Lymphedema LEs - finished treatment at [facility]  Prior Level of Function:Not working, lives in [facility] access hospital apt alone;     Visits from Start of Care: 7    Missed Visits: 0    The Plan of Care and following information is based on the patient's current status:  Short Term Goals: To be accomplished in 1 weeks:  Goal: Pt to be compliant with initial HEP to improve hip mobility, postural positioning, core activation. Status at last note/certification: Established and reviewed with Pt  Current: MET: Pt compliant with HEP exercises. Goal: Pt to initiate aquatics program without increased pain to aid in achievement of all LTGs. Status at last note/certification: N/A  Current: MET: Pt able to perform all aquatic exercises without increases in pain. Long Term Goals: To be accomplished in 5 weeks:  Goal: Pt to be able to stand erect consistently during therapy sessions without pain to reduce strain to low back with standing/amb activities. Status at last note/certification: FWD trunk posture with all standing/amb with pain  Current: Progressing: Pt able to maintain upright posture for 10 minutes.  (2022)  Goal: Pt to increase B hip/knee strength to 4/5 grossly to increase ease of transfers and performance of ADLs. Status at last note/certification: 3/5 grossly B hip/knee strength  Current: Progressing: Bilateral knee flexion and extension: 3+/5, Hip flexion bilateral 3+/5,  bilateral hip flexion 3+/5, (standing)bilateral hip abduction 3+/5. (9/29/2022)  Goal: Pt to increase standing/amb tolerance to 15 minutes to increase ease of meal preparation, performance of light household chores. Status at last note/certification: 5 minute tolerance  Current: not met: 5 minute walking tolerance. (9/29/2022)  Goal: Pt to report < 4/10 pain at worst to increase ease with ADLs. Status at last note/certification: 0/63 pain at worst  Current: not met: 8/10 pain at worst, while lying down on her back during her MRI. (9/29/2022)  Goal: Pt to report FOTO score of 39 pts to show improved function and quality of life. Status at last note/certification: FOTO 25 pts  Current: Progressing: FOTO 28 points (9/29/2022)    Key functional changes: Pt has attended 7 skilled therapy sessions and is making progress towards her goals. Her functional deficits include inability to lay flat on her back, increased pain when standing for greater than 5 minutes, and increased difficulty when performing light ADLs. Pt's functional gains include decreased overall pain levels, and improved posture with ADLs. She also describes her lower back and neck as, \"looser\" and notes increased ease with transfers. Her max pain level is a 8/10 and occurred when lying down to have her MRI performed. Her best pain level is a 0/10 at rest. She gives herself a 60 percent self reported improvement since starting therapy. Pt also would benefit from alternating land/ pool therapy sessions. Continued therapy recommended, to address functional deficits. Problems/ barriers to goal attainment: none.      Problem List: pain affecting function, decrease ROM, decrease strength, edema affecting function, impaired gait/ balance, decrease ADL/ functional abilitiies, decrease activity tolerance, and decrease flexibility/ joint mobility    Treatment Plan: Therapeutic exercise, Therapeutic activities, Neuromuscular re-education, Physical agent/modality, Gait/balance training, Manual therapy, Aquatic therapy, Patient education, Self Care training, and Functional mobility training     Patient Goal (s) has been updated and includes: \"I want to walk further, and work on my posture. I Also want to improve my stamina. \"     Goals for this certification period to be accomplished in 5 weeks:  Goal: Pt to be able to stand erect consistently during therapy sessions without pain to reduce strain to low back with standing/amb activities. Status at last note/certification: Pt able to maintain upright posture for 10 minutes  Current:   Goal: Pt to increase B hip/knee strength to 4/5 grossly to increase ease of transfers and performance of ADLs. Status at last note/certification: Bilateral knee flexion and extension: 3+/5, Hip flexion bilateral 3+/5, (standing)bilateral hip abduction 3+/5. Current:   Goal: Pt to increase standing/amb tolerance to 15 minutes to increase ease of meal preparation, performance of light household chores. Status at last note/certification: 5 minute tolerance  Current:  Goal: Pt to report < 4/10 pain at worst to increase ease with ADLs. Status at last note/certification: 0/55 pain at worst, while lying down on her back during her MRI. Current:  Goal: Pt to report FOTO score of 39 pts to show improved function and quality of life. Status at last note/certification: FOTO 28 points   Current:    Frequency / Duration: Patient to be seen 2 times per week for 5 weeks:    Assessment / Recommendations: Continued therapy recommended to address poor standing/ walking endurance, and increase ADL tolerance.       Certification Period: 9/29/2022- 10/28/2022    Lorraine Rai, PT 9/29/2022 1:33 PM    ________________________________________________________________________  I certify that the above Therapy Services are being furnished while the patient is under my care. I agree with the treatment plan and certify that this therapy is necessary. [] I have read the above and request that my patient continue as recommended.   [] I have read the above report and request that my patient continue therapy with the following changes/special instructions: ______________________________________  [] I have read the above report and request that my patient be discharged from therapy    Physician's Signature:____________Date:_________TIME:________     Misael Archuleta MD  ** Signature, Date and Time must be completed for valid certification **    Please sign and return to [facility] - [address]  [phone] fax

## 2022-09-29 NOTE — PERIOP NOTES
PRE-SURGICAL INSTRUCTIONS        Patient's Name:  Soraida Villar      HCGWV'Q Date:  9/29/2022            Covid Testing Date and Time:    Surgery Date:  10/3/2022                Do NOT eat or drink anything, including candy, gum, or ice chips after midnight on 10/02, unless you have specific instructions from your surgeon or anesthesia provider to do so. You may brush your teeth before coming to the hospital.  No smoking 24 hours prior to the day of surgery. No alcohol 24 hours prior to the day of surgery. No recreational drugs for one week prior to the day of surgery. Leave all valuables, including money/purse, at home. Remove all jewelry, nail polish, acrylic nails, and makeup (including mascara); no lotions powders, deodorant, or perfume/cologne/after shave on the skin. Follow instruction for Hibiclens washes and CHG wipes from surgeon's office. Glasses/contact lenses and dentures may be worn to the hospital.  They will be removed prior to surgery. Call your doctor if symptoms of a cold or illness develop within 24-48 hours prior to your surgery. 11.  If you are having an outpatient procedure, please make arrangements for a responsible ADULT TO [address] [place] [address] and stay with you for 24 hours after your surgery. 12. ONE VISITOR in the hospital at this time for outpatient procedures. Exceptions may be made for surgical admissions, per nursing unit guidelines      Special Instructions:      Bring list of CURRENT medications. Bring inhaler. Bring any pertinent legal medical records. Take these medications the morning of surgery with a sip of water:  Ibesartan  Follow physician instructions about stopping anticoagulants. On the day of surgery, come in the main entrance of [facility]. Let the  at the desk know you are there for surgery. A staff member will come escort you to the surgical area on the second floor.     If you have any questions or concerns, please do not hesitate to call:     (Prior to the day of surgery) PAT department:  [phone]   (Day of surgery) Pre-Op department:  [phone]    These surgical instructions were reviewed with [facility] during the [facility] phone call.

## 2022-09-30 ENCOUNTER — ANESTHESIA EVENT (OUTPATIENT)
Dept: ENDOSCOPY | Age: 47
End: 2022-09-30
Payer: MEDICARE

## 2022-10-03 ENCOUNTER — ANESTHESIA (OUTPATIENT)
Dept: ENDOSCOPY | Age: 47
End: 2022-10-03
Payer: MEDICARE

## 2022-10-03 ENCOUNTER — HOSPITAL ENCOUNTER (OUTPATIENT)
Age: 47
Setting detail: OUTPATIENT SURGERY
Discharge: HOME | End: 2022-10-03
Attending: INTERNAL MEDICINE | Admitting: INTERNAL MEDICINE
Payer: MEDICARE

## 2022-10-03 VITALS
BODY MASS INDEX: 45.16 KG/M2 | RESPIRATION RATE: 18 BRPM | OXYGEN SATURATION: 98 % | SYSTOLIC BLOOD PRESSURE: 101 MMHG | WEIGHT: 230 LBS | HEART RATE: 64 BPM | HEIGHT: 60 IN | TEMPERATURE: 98 F | DIASTOLIC BLOOD PRESSURE: 66 MMHG

## 2022-10-03 PROCEDURE — 76040000019: Performed by: INTERNAL MEDICINE

## 2022-10-03 PROCEDURE — 77030019988 HC FCPS ENDOSC DISP BSC -B: Performed by: INTERNAL MEDICINE

## 2022-10-03 PROCEDURE — 76060000031 HC ANESTHESIA FIRST 0.5 HR: Performed by: INTERNAL MEDICINE

## 2022-10-03 PROCEDURE — 00731 ANES UPR GI NDSC PX NOS: CPT | Performed by: NURSE ANESTHETIST, CERTIFIED REGISTERED

## 2022-10-03 PROCEDURE — 74011250636 HC RX REV CODE- 250/636: Performed by: NURSE ANESTHETIST, CERTIFIED REGISTERED

## 2022-10-03 PROCEDURE — 88305 TISSUE EXAM BY PATHOLOGIST: CPT

## 2022-10-03 PROCEDURE — 2709999900 HC NON-CHARGEABLE SUPPLY: Performed by: INTERNAL MEDICINE

## 2022-10-03 PROCEDURE — 00731 ANES UPR GI NDSC PX NOS: CPT | Performed by: ANESTHESIOLOGY

## 2022-10-03 PROCEDURE — 74011000250 HC RX REV CODE- 250: Performed by: NURSE ANESTHETIST, CERTIFIED REGISTERED

## 2022-10-03 PROCEDURE — 77030008565 HC TBNG SUC IRR ERBE -B: Performed by: INTERNAL MEDICINE

## 2022-10-03 RX ORDER — FLUMAZENIL 0.1 MG/ML
0.2 INJECTION INTRAVENOUS
Status: CANCELLED | OUTPATIENT
Start: 2022-10-03 | End: 2022-10-03

## 2022-10-03 RX ORDER — DEXTROMETHORPHAN/PSEUDOEPHED 2.5-7.5/.8
1.2 DROPS ORAL
Status: CANCELLED | OUTPATIENT
Start: 2022-10-03

## 2022-10-03 RX ORDER — NALOXONE HYDROCHLORIDE 0.4 MG/ML
0.4 INJECTION, SOLUTION INTRAMUSCULAR; INTRAVENOUS; SUBCUTANEOUS
Status: CANCELLED | OUTPATIENT
Start: 2022-10-03 | End: 2022-10-03

## 2022-10-03 RX ORDER — EPINEPHRINE 0.1 MG/ML
1 INJECTION INTRACARDIAC; INTRAVENOUS
Status: CANCELLED | OUTPATIENT
Start: 2022-10-03 | End: 2022-10-04

## 2022-10-03 RX ORDER — SODIUM CHLORIDE 0.9 % (FLUSH) 0.9 %
5-40 SYRINGE (ML) INJECTION EVERY 8 HOURS
Status: CANCELLED | OUTPATIENT
Start: 2022-10-03

## 2022-10-03 RX ORDER — ATROPINE SULFATE 0.1 MG/ML
0.5 INJECTION INTRAVENOUS
Status: CANCELLED | OUTPATIENT
Start: 2022-10-03 | End: 2022-10-04

## 2022-10-03 RX ORDER — SODIUM CHLORIDE, SODIUM LACTATE, POTASSIUM CHLORIDE, CALCIUM CHLORIDE 600; 310; 30; 20 MG/100ML; MG/100ML; MG/100ML; MG/100ML
50 INJECTION, SOLUTION INTRAVENOUS CONTINUOUS
Status: DISCONTINUED | OUTPATIENT
Start: 2022-10-03 | End: 2022-10-03 | Stop reason: HOSPADM

## 2022-10-03 RX ORDER — SODIUM CHLORIDE 0.9 % (FLUSH) 0.9 %
5-40 SYRINGE (ML) INJECTION AS NEEDED
Status: CANCELLED | OUTPATIENT
Start: 2022-10-03

## 2022-10-03 RX ORDER — LIDOCAINE HYDROCHLORIDE 10 MG/ML
0.1 INJECTION, SOLUTION EPIDURAL; INFILTRATION; INTRACAUDAL; PERINEURAL AS NEEDED
Status: DISCONTINUED | OUTPATIENT
Start: 2022-10-03 | End: 2022-10-03 | Stop reason: HOSPADM

## 2022-10-03 RX ORDER — PROPOFOL 10 MG/ML
INJECTION, EMULSION INTRAVENOUS AS NEEDED
Status: DISCONTINUED | OUTPATIENT
Start: 2022-10-03 | End: 2022-10-03 | Stop reason: HOSPADM

## 2022-10-03 RX ADMIN — FAMOTIDINE 20 MG: 10 INJECTION INTRAVENOUS at 10:57

## 2022-10-03 RX ADMIN — PROPOFOL 50 MG: 10 INJECTION, EMULSION INTRAVENOUS at 11:48

## 2022-10-03 RX ADMIN — SODIUM CHLORIDE, POTASSIUM CHLORIDE, SODIUM LACTATE AND CALCIUM CHLORIDE 50 ML/HR: 600; 310; 30; 20 INJECTION, SOLUTION INTRAVENOUS at 10:56

## 2022-10-03 RX ADMIN — PROPOFOL 20 MG: 10 INJECTION, EMULSION INTRAVENOUS at 11:45

## 2022-10-03 RX ADMIN — PROPOFOL 50 MG: 10 INJECTION, EMULSION INTRAVENOUS at 11:44

## 2022-10-03 RX ADMIN — PROPOFOL 30 MG: 10 INJECTION, EMULSION INTRAVENOUS at 11:46

## 2022-10-03 NOTE — DISCHARGE INSTRUCTIONS
Upper GI Endoscopy: What to Expect at [address] had an upper GI endoscopy. Your doctor used a thin, lighted tube that bends to look at the inside of your esophagus, your stomach, and the first part of the small intestine, called the duodenum. After you have an endoscopy, you will stay at the hospital or clinic for 1 to 2 hours. This will allow the medicine to wear off. You will be able to go home after your doctor or nurse checks to make sure that you're not having any problems. You may have to stay overnight if you had treatment during the test. You may have a sore throat for a day or two after the test.  This care sheet gives you a general idea about what to expect after the test.  How can you care for yourself at home? Activity   Rest as much as you need to after you go home. You should be able to go back to your usual activities the day after the test.  Diet   Follow your doctor's directions for eating after the test.  Drink plenty of fluids (unless your doctor has told you not to). Medications   If you have a sore throat the day after the test, use an over-the-counter spray to numb your throat. Follow-up care is a key part of your treatment and safety. Be sure to make and go to all appointments, and call your doctor if you are having problems. It's also a good idea to know your test results and keep a list of the medicines you take. When should you call for help? Call 911 anytime you think you may need emergency care. For example, call if:    You passed out (lost consciousness). You have trouble breathing. You pass maroon or bloody stools. Call your doctor now or seek immediate medical care if:    You have pain that does not get better after your take pain medicine. You have new or worse belly pain. You have blood in your stools. You are sick to your stomach and cannot keep fluids down. You have a fever. You cannot pass stools or gas.    Watch closely for changes in your health, and be sure to contact your doctor if:    Your throat still hurts after a day or two. You do not get better as expected. Where can you learn more? Go to http://www.ponce.com/  Enter J454 in the search box to learn more about \"Upper GI Endoscopy: What to Expect at Home. \"  Current as of: September 8, 2021               Content Version: 13.2  © 2006-2022 [employer]. Care instructions adapted under license by [facility] (which disclaims liability or warranty for this information). If you have questions about a medical condition or this instruction, always ask your healthcare professional. [address] any warranty or liability for your use of this information. Esophageal Dilation: What to Expect at [address] had an esophageal dilation. This procedure can open up narrow areas of the esophagus. After the procedure, you will stay at the hospital or surgery center for 1 to 2 hours. This will allow the medicine to wear off. You will be able to go home after your doctor or nurse checks to make sure that you're not having any problems. This care sheet gives you a general idea about how long it will take for you to recover. But each person recovers at a different pace. Follow the steps below to get better as quickly as possible. How can you care for yourself at home? Activity    Rest as much as you need to after you go home. You should be able to go back to your usual activities the day after the procedure. Diet    Follow your doctor's directions for eating after the procedure. Drink plenty of fluids (unless your doctor has told you not to). Medicines    Your doctor will tell you if and when you can restart your medicines. He or she will also give you instructions about taking any new medicines.      If you take aspirin or some other blood thinner, ask your doctor if and when to start taking it again. Make sure that you understand exactly what your doctor wants you to do. If you have a sore throat the day after the procedure, use an over-the-counter spray to numb your throat. Sucking on throat lozenges and gargling with warm salt water may also help relieve your symptoms. Follow-up care is a key part of your treatment and safety. Be sure to make and go to all appointments, and call your doctor if you are having problems. It's also a good idea to know your test results and keep a list of the medicines you take. When should you call for help? Call 911 anytime you think you may need emergency care. For example, call if:    You passed out (lost consciousness). You have trouble breathing. Your stools are maroon or very bloody   Call your doctor now or seek immediate medical care if:    You have new or worse belly pain. You have a fever. You have new or more blood in your stools. You are sick to your stomach and cannot drink fluids. You cannot pass stools or gas. You have pain that does not get better after you take pain medicine. Watch closely for changes in your health, and be sure to contact your doctor if:    Your throat still hurts after a day or two. You do not get better as expected. Where can you learn more? Go to http://www.gray.com/  Enter J014 in the search box to learn more about \"Esophageal Dilation: What to Expect at Home. \"  Current as of: September 8, 2021               Content Version: 13.2  © 2006-2022 [employer]. Care instructions adapted under license by [employer] (which disclaims liability or warranty for this information). If you have questions about a medical condition or this instruction, always ask your healthcare professional. [place] [address] any warranty or liability for your use of this information.     DISCHARGE SUMMARY from Nurse    PATIENT INSTRUCTIONS:    After general anesthesia or intravenous sedation, for 24 hours or while taking prescription Narcotics:  Limit your activities  Do not drive and operate hazardous machinery  Do not make important personal or business decisions  Do  not drink alcoholic beverages  If you have not urinated within 8 hours after discharge, please contact your surgeon on call. Report the following to your surgeon:  Excessive pain, swelling, redness or odor of or around the surgical area  Temperature over 100.5  Nausea and vomiting lasting longer than 4 hours or if unable to take medications  Any signs of decreased circulation or nerve impairment to extremity: change in color, persistent  numbness, tingling, coldness or increase pain  Any questions        These are general instructions for a healthy lifestyle:    No smoking/ No tobacco products/ Avoid exposure to second hand smoke  Surgeon General's Warning:  Quitting smoking now greatly reduces serious risk to your health. Obesity, smoking, and sedentary lifestyle greatly increases your risk for illness    A healthy diet, regular physical exercise & weight monitoring are important for maintaining a healthy lifestyle    You may be retaining fluid if you have a history of heart failure or if you experience any of the following symptoms:  Weight gain of 3 pounds or more overnight or 5 pounds in a week, increased swelling in our hands or feet or shortness of breath while lying flat in bed. Please call your doctor as soon as you notice any of these symptoms; do not wait until your next office visit. The discharge information has been reviewed with the patient. The patient verbalized understanding. Discharge medications reviewed with the patient and appropriate educational materials and side effects teaching were provided.   ___________________________________________________________________________________________________________________________________

## 2022-10-03 NOTE — PROCEDURES
[email or web address]  [facility], [address]      Brief Procedure Note    [place] Screen  1975  [id number]    Date of Procedure: 10/3/2022    Preoperative diagnosis: Gastroesophageal reflux disease, unspecified whether esophagitis present [F28.8]  Helicobacter pylori gastritis [K29.70, B96.81]  Chronic constipation [K59.09]  Dysphagia, unspecified type [R13.10]    Postoperative diagnosis: Non-obstructive dysphagia with 48Fr dialtion, esophagus bx's r/o EOE    Type of Anesthesia: MAC (Monitored anesthesia care)    Description of findings: same as post op dx    Procedure: Procedure(s):  UPPER ENDOSCOPY with bx's & 48Fr dilation    :  Dr. Basil Street MD    Assistant(s): Endoscopy Technician-1: Marquis Wilkins Staff: Wayne Oreilly RN    EBL:None    Specimens:   ID Type Source Tests Collected by Time Destination   1 : esophagus bxs Preservative Esophagus  Moriah Goddard MD 10/3/2022 1147 Pathology       Findings: See printed and scanned procedure note    Complications: None    Dr. Bsail Street MD  10/3/2022  11:54 AM

## 2022-10-03 NOTE — ANESTHESIA PREPROCEDURE EVALUATION
Relevant Problems   ENDOCRINE   (+) Severe obesity (HCC)       Anesthetic History   No history of anesthetic complications            Review of Systems / Medical History  Patient summary reviewed and pertinent labs reviewed    Pulmonary            Asthma : well controlled       Neuro/Psych         Psychiatric history    Comments: Schizoaffective disorder Cardiovascular    Hypertension                   GI/Hepatic/Renal  Within defined limits              Endo/Other        Morbid obesity     Other Findings              Physical Exam    Airway  Mallampati: II  TM Distance: 4 - 6 cm  Neck ROM: normal range of motion   Mouth opening: Normal     Cardiovascular  Regular rate and rhythm,  S1 and S2 normal,  no murmur, click, rub, or gallop             Dental  No notable dental hx       Pulmonary  Breath sounds clear to auscultation               Abdominal  GI exam deferred       Other Findings            Anesthetic Plan    ASA: 3  Anesthesia type: MAC          Induction: Intravenous  Anesthetic plan and risks discussed with: Patient

## 2022-10-03 NOTE — H&P
[email or web address]  [phone]    GASTROENTEROLOGY Pre-Procedure H and P      Impression/Plan:   1. This patient is consented for an EGD for dysphagia, GERD and h/o H.pylori gastritis. Chief Complaint: dysphagia, GERD and h/o H.pylori gastritis. HPI:  Stephy Lee is a 50 y.o. female who presents for an EGD for evaluation of dysphagia, GERD and h/o H.pylori gastritis.       PMH:   Past Medical History:   Diagnosis Date    Anxiety     Asthma     Bronchitis     Depression     pt states anxiety schizoaffective, ptsd,adhd,mixed receptive language disorder    Eye twitch     Hypertension     Kidney carcinoma, left ([facility] [address]     Nephrectomy    Lymphedema     Psychotic affective disorder ([facility] [address]     attention deficit disorder, and PTSD    Psychotic disorder (HCC)     Schizoaffective disorder       PSH:   Past Surgical History:   Procedure Laterality Date    COLONOSCOPY N/A 11/09/2021    COLONOSCOPY performed by Freya Godoy MD at [facility] ENDOSCOPY    HX COLONOSCOPY      HX ENDOSCOPY      HX HYSTERECTOMY      HX NEPHRECTOMY Left     HX OOPHORECTOMY Right        Social HX:   Social History     Socioeconomic History    Marital status: SINGLE     Spouse name: Not on file    Number of children: Not on file    Years of education: Not on file    Highest education level: Not on file   Occupational History    Not on file   Tobacco Use    Smoking status: Former     Types: Cigarettes     Quit date: 11/27/2011     Years since quitting: 10.8    Smokeless tobacco: Never   Vaping Use    Vaping Use: Never used   Substance and Sexual Activity    Alcohol use: Yes     Comment: special occasion    Drug use: No    Sexual activity: Not on file   Other Topics Concern    Not on file   Social History Narrative    ** Merged History Encounter **          Social Determinants of Health     Financial Resource Strain: Not on file   Food Insecurity: Not on file   Transportation Needs: Not on file   Physical Activity: Not on file   Stress: Not on file Social Connections: Not on file   Intimate Partner Violence: Not on file   Housing Stability: Not on file       [id number] [place],[address]:   History reviewed. No pertinent family history. Allergy:   Allergies   Allergen Reactions    Seafood Hives and Swelling    Iodine Other (comments)    Iodinated Contrast Media Swelling    Lyrica [Pregabalin] Other (comments)     rash    Nsaids (Non-Steroidal Anti-Inflammatory Drug) Other (comments)    Shellfish Derived Angioedema       Home Medications:     Medications Prior to Admission   Medication Sig    traZODone (DESYREL) 50 mg tablet Take 50 mg by mouth nightly. cholecalciferol (VITAMIN D3) (50,000 UNITS /1250 MCG) capsule Take 50,000 Units by mouth every seven (7) days. cetirizine (ZYRTEC) 10 mg tablet Take 10 mg by mouth daily as needed for Allergies. Indications: seasonal runny nose    acetaminophen (TYLENOL) 500 mg tablet Take 1,000 mg by mouth every six (6) hours as needed for Pain.    triamterene-hydroCHLOROthiazide (MAXZIDE) 37.5-25 mg per tablet Take 1 Tablet by mouth daily. gabapentin (NEURONTIN) 300 mg capsule Take 1 Capsule by mouth two (2) times a day. Max Daily Amount: 600 mg.    fluconazole (DIFLUCAN) 100 mg tablet Take 100 mg by mouth every seven (7) days. QUEtiapine (SEROquel) 50 mg tablet Take 50 mg by mouth daily. benzonatate (TESSALON) 100 mg capsule Take 100 mg by mouth two (2) times daily as needed. benztropine (COGENTIN) 1 mg tablet Take 1 mg by mouth daily as needed. lidocaine (LIDODERM) 5 % APPLY 1-2 PATCHES DAILY TO AFFECTED AREAS AS NEEDED FOR PAIN. 12 HOURS ON 12 HOURS OFF    sertraline (ZOLOFT) 100 mg tablet Take 1 Tablet by mouth daily. irbesartan (AVAPRO) 300 mg tablet Take 1 Tablet by mouth nightly. (Patient taking differently: Take 300 mg by mouth daily.)    ciclopirox (PENLAC) 8 % solution Apply  to affected area daily. Compression Socks, Large misc 2 Each by Does Not Apply route daily.     simvastatin (ZOCOR) 20 mg tablet Take 20 mg by mouth nightly. Linzess 290 mcg cap capsule Take 290 mcg by mouth as needed. pantoprazole (PROTONIX) 40 mg tablet Take 40 mg by mouth daily. cyanocobalamin 1,000 mcg tablet Take 1,000 mcg by mouth daily. hydrOXYzine pamoate (VISTARIL) 25 mg capsule Take 25 mg by mouth three (3) times daily as needed. ciclopirox (LOPROX) 0.77 % topical cream APPLY TO AFFECTED AREA TWICE A DAY    Amitiza 24 mcg capsule Take 24 mcg by mouth daily (with breakfast). Indications: as needed    multivitamin (ONE A DAY) tablet Take 1 Tab by mouth daily. Ascorbic Acid-Multivits-Min 1,000 mg pwep Take 1,000 mg by mouth daily. zinc 50 mg tab tablet Take 50 mg by mouth daily as needed. fluticasone propionate (FLONASE) 50 mcg/actuation nasal spray 1 Spray daily as needed. albuterol (PROVENTIL HFA, VENTOLIN HFA, PROAIR HFA) 90 mcg/actuation inhaler Take 1-2 Puffs by inhalation every six (6) hours as needed. Review of Systems:     A complete 10 point review of systems was performed and pertinents are as per the HPI. Remainder of the review of systems was negative. Visit Vitals  BP (!) 129/59   Pulse 72   Temp 97.9 °F (36.6 °C)   Resp 20   Ht 5' (1.524 m)   Wt 104.3 kg (230 lb)   SpO2 99%   Breastfeeding No   BMI 44.92 kg/m²       Physical Assessment:     General: alert, cooperative, no acute distress, appears stated age. HEENT: normocephalic, no scleral icterus, moist mucous membranes, EOMs intact, no neck masses noted. Respiratory: lungs clear to auscultation bilaterally. Cardiovascular: regular rate and rhythm, no murmurs, rubs or gallops. Abdomen: normal bowel sounds, soft, non-tender to palpation. Extremities: no lower extremity edema, no cyanosis or clubbing. Neuro: alert and oriented x 3; non-focal exam.  Skin: no rashes. Psych: normal mood and affect. Kasia Queen MD  [facility]  [email or web address]  Phone: [phone]  Pager: [phone]  [email or web address]

## 2022-10-03 NOTE — ANESTHESIA POSTPROCEDURE EVALUATION
Procedure(s):  UPPER ENDOSCOPY with bx's & 48Fr dilation. MAC    Anesthesia Post Evaluation      Multimodal analgesia: multimodal analgesia used between 6 hours prior to anesthesia start to PACU discharge  Patient location during evaluation: PACU  Patient participation: complete - patient participated  Level of consciousness: awake and alert  Pain management: adequate  Airway patency: patent  Anesthetic complications: no  Cardiovascular status: acceptable  Respiratory status: acceptable  Hydration status: acceptable  Post anesthesia nausea and vomiting:  controlled  Final Post Anesthesia Temperature Assessment:  Normothermia (36.0-37.5 degrees C)      INITIAL Post-op Vital signs: No vitals data found for the desired time range.

## 2022-10-04 ENCOUNTER — APPOINTMENT (OUTPATIENT)
Dept: PHYSICAL THERAPY | Age: 47
End: 2022-10-04
Attending: PHYSICAL MEDICINE & REHABILITATION
Payer: MEDICARE

## 2022-10-04 ENCOUNTER — HOSPITAL ENCOUNTER (OUTPATIENT)
Dept: PHYSICAL THERAPY | Age: 47
Discharge: HOME | End: 2022-10-04
Attending: PHYSICAL MEDICINE & REHABILITATION
Payer: MEDICARE

## 2022-10-04 PROCEDURE — 97110 THERAPEUTIC EXERCISES: CPT

## 2022-10-04 PROCEDURE — 97530 THERAPEUTIC ACTIVITIES: CPT

## 2022-10-04 NOTE — PROGRESS NOTES
PT DAILY TREATMENT NOTE     Patient Name: Sugar Bustillo  Date:10/4/2022  : 1975  [x]  Patient  Verified  Payor: Rene Cerna / Plan: [facility] MEDICAID / Product Type: Managed Care Medicaid /    In time:12:02  Out time:12:45  Total Treatment Time (min): 43  Visit #: 2 of 10    Medicare/BCBS Only   Total Timed Codes (min):  43 1:1 Treatment Time:  43       Treatment Area: Cervicalgia [M54.2]  Other low back pain [M54.59]    SUBJECTIVE  Pain Level (0-10 scale): 6  Any medication changes, allergies to medications, adverse drug reactions, diagnosis change, or new procedure performed?: [x] No    [] Yes (see summary sheet for update)  Subjective functional status/changes:   [] No changes reported  My lower back is bothering me today. OBJECTIVE    28 min Therapeutic Exercise:  [] See flow sheet :   Rationale: increase ROM and increase strength to improve the patients ability to bend, lift and squat. 15 min Therapeutic Activity:  []  See flow sheet :   Rationale: increase strength, improve coordination, and improve balance  to improve the patients ability to negotiate stairs. With   [x] TE   [x] TA   [x] neuro   [] other: Patient Education: [x] Review HEP    [] Progressed/Changed HEP based on:   [] positioning   [] body mechanics   [] transfers   [] heat/ice application    [] other:      Other Objective/Functional Measures: exercises per chart. Pain Level (0-10 scale) post treatment: 0    ASSESSMENT/Changes in Function: Pt reports to skilled therapy session with increased lower back pain with ADLs and decreased function LE strength. Pt noted minor increases in lower back pain during standing exercises with Pt experiencing improvement following sitting rest breaks. Tactile cures were provided during PPTs to engage the TA and improve standing endurance. Pt was unable to lay supine for extended periods due to increased symptoms, but improved with seated Swiss ball flexion.  Visual cues were provided during seated shoulder horizontal abduction  to engage the posterior deltoid and improve ease with reaching overhead. Session tolerated well with only minor increases in pain. All post treatment modalities were declined,    Patient will continue to benefit from skilled PT services to modify and progress therapeutic interventions, address functional mobility deficits, address ROM deficits, address strength deficits, analyze and address soft tissue restrictions, analyze and cue movement patterns, analyze and modify body mechanics/ergonomics, and assess and modify postural abnormalities to attain remaining goals. []  See Plan of Care  []  See progress note/recertification  []  See Discharge Summary         Progress towards goals / Updated goals:  Goal: Pt to be able to stand erect consistently during therapy sessions without pain to reduce strain to low back with standing/amb activities. Status at last note/certification: Pt able to maintain upright posture for 10 minutes  Current:   Goal: Pt to increase B hip/knee strength to 4/5 grossly to increase ease of transfers and performance of ADLs. Status at last note/certification: Bilateral knee flexion and extension: 3+/5, Hip flexion bilateral 3+/5, (standing)bilateral hip abduction 3+/5. Current:   Goal: Pt to increase standing/amb tolerance to 15 minutes to increase ease of meal preparation, performance of light household chores. Status at last note/certification: 5 minute tolerance  Current:  Goal: Pt to report < 4/10 pain at worst to increase ease with ADLs. Status at last note/certification: 2/39 pain at worst, while lying down on her back during her MRI. Current:  Goal: Pt to report FOTO score of 39 pts to show improved function and quality of life.   Status at last note/certification: FOTO 28 points   Current:    PLAN  [x]  Upgrade activities as tolerated     [x]  Continue plan of care  []  Update interventions per flow sheet       [] Discharge due to:_  []  Other:_      Starlet Body, PTA 10/4/2022  12:09 PM    Future Appointments   Date Time Provider Frederic Sanders   10/6/2022  1:30 PM POOL [facility]OURCE [facility]   10/11/2022 12:00 PM Piyush Sandoval [facility]   11/3/2022  4:00 PM Trace Reyes MD [facility]

## 2022-10-06 ENCOUNTER — APPOINTMENT (OUTPATIENT)
Dept: PHYSICAL THERAPY | Age: 47
End: 2022-10-06
Attending: PHYSICAL MEDICINE & REHABILITATION
Payer: MEDICARE

## 2022-10-11 ENCOUNTER — HOSPITAL ENCOUNTER (OUTPATIENT)
Dept: PHYSICAL THERAPY | Age: 47
Discharge: HOME | End: 2022-10-11
Attending: PHYSICAL MEDICINE & REHABILITATION
Payer: MEDICARE

## 2022-10-11 PROCEDURE — 97113 AQUATIC THERAPY/EXERCISES: CPT

## 2022-10-13 ENCOUNTER — HOSPITAL ENCOUNTER (OUTPATIENT)
Dept: PHYSICAL THERAPY | Age: 47
Discharge: HOME | End: 2022-10-13
Attending: PHYSICAL MEDICINE & REHABILITATION
Payer: MEDICARE

## 2022-10-13 PROCEDURE — 97113 AQUATIC THERAPY/EXERCISES: CPT

## 2022-10-18 ENCOUNTER — HOSPITAL ENCOUNTER (OUTPATIENT)
Dept: PHYSICAL THERAPY | Age: 47
Discharge: HOME | End: 2022-10-18
Attending: PHYSICAL MEDICINE & REHABILITATION
Payer: MEDICARE

## 2022-10-18 ENCOUNTER — APPOINTMENT (OUTPATIENT)
Dept: PHYSICAL THERAPY | Age: 47
End: 2022-10-18
Attending: PHYSICAL MEDICINE & REHABILITATION
Payer: MEDICARE

## 2022-10-18 PROCEDURE — 97014 ELECTRIC STIMULATION THERAPY: CPT

## 2022-10-18 PROCEDURE — 97112 NEUROMUSCULAR REEDUCATION: CPT

## 2022-10-18 PROCEDURE — 97140 MANUAL THERAPY 1/> REGIONS: CPT

## 2022-10-18 PROCEDURE — 97110 THERAPEUTIC EXERCISES: CPT

## 2022-10-18 NOTE — PROGRESS NOTES
PT DAILY TREATMENT NOTE     Patient Name: Omi Maria  Date:10/18/2022  : 1975  [x]  Patient  Verified  Payor: Leon Burn / [facility] / Product Type: Managed Care Medicare /    In time:3:50  Out time:4:45  Total Treatment Time (min): 55  Visit #: 5 of 10    Medicare/BCBS Only   Total Timed Codes (min):  45 1:1 Treatment Time:  45       Treatment Area: Cervicalgia [M54.2]  Other low back pain [M54.59]    SUBJECTIVE  Pain Level (0-10 scale): 7  Any medication changes, allergies to medications, adverse drug reactions, diagnosis change, or new procedure performed?: [x] No    [] Yes (see summary sheet for update)  Subjective functional status/changes:   [] No changes reported  The pool helps for a couple hours, but then the pain comes back       OBJECTIVE    Modality rationale: decrease pain and increase tissue extensibility to improve the patients ability to improve ease of function, pain reduction   Min Type Additional Details   10 [x] Estim:  [x]Unatt       [x]IFC  []Premod                        []Other:  []w/ice   [x]w/heat  Position: S/L  Location: LB    [] Estim: []Att    []TENS instruct  []NMES                    []Other:  []w/US   []w/ice   []w/heat  Position:  Location:    []  Traction: [] Cervical       []Lumbar                       [] Prone          []Supine                       []Intermittent   []Continuous Lbs:  [] before manual  [] after manual    []  Ultrasound: []Continuous   [] Pulsed                           []1MHz   []3MHz W/cm2:  Location:    []  Iontophoresis with dexamethasone         Location: [] Take home patch   [] In clinic    []  Ice     []  heat  []  Ice massage  []  Laser   []  Anodyne Position:  Location:    []  Laser with stim  []  Other:  Position:  Location:    []  Vasopneumatic Device    []  Right     []  Left  Pre-treatment girth:  Post-treatment girth:  Measured at (location):  Pressure:       [] lo [] med [] hi   Temperature: [] lo [] med [] hi   [x] Skin assessment post-treatment:  [x]intact []redness- no adverse reaction    []redness - adverse reaction:         15 min Therapeutic Exercise:  [] See flow sheet :   Rationale: increase ROM and increase strength to improve the patients ability to improve ease of daily tasks, walking, ADL's     20 min Neuromuscular Re-education:  []  See flow sheet :   Rationale: increase strength and improve coordination  to improve the patients ability to increase core stability, ease of standing/walking for meal prep and functional asks    10 min Manual Therapy:  STM to lumbar paraspinals in S/L    The manual therapy interventions were performed at a separate and distinct time from the therapeutic activities interventions. Rationale: decrease pain, increase ROM, and increase tissue extensibility to reduce muscle tension, ease of activity tolerance          With   [] TE   [] TA   [] neuro   [] other: Patient Education: [x] Review HEP    [] Progressed/Changed HEP based on:   [] positioning   [] body mechanics   [] transfers   [] heat/ice application    [] other:      Other Objective/Functional Measures: added STM to LB for pain reduction     Pain Level (0-10 scale) post treatment: 1    ASSESSMENT/Changes in Function: pt seen today to address Cervicalgia [M54.2], Other low back pain [M54.59]. Pain level remains elevated at 7/10. Pt seen today for land based PT, with treatments focused on core activation and lumbar mobility. Pt reports right LE pain with DKTC once hips are greater than 90 degrees flexion, eased up to 90 degrees.   Added E-stim with MH at end of session for pain reduction, with symptoms decreased to 1/10     Patient will continue to benefit from skilled PT services to modify and progress therapeutic interventions, address functional mobility deficits, address ROM deficits, address strength deficits, analyze and address soft tissue restrictions, analyze and cue movement patterns, analyze and modify body mechanics/ergonomics, assess and modify postural abnormalities, address imbalance/dizziness, and instruct in home and community integration to attain remaining goals. []  See Plan of Care  []  See progress note/recertification  []  See Discharge Summary         Progress towards goals / Updated goals:  Goal: Pt to be able to stand erect consistently during therapy sessions without pain to reduce strain to low back with standing/amb activities. Status at last note/certification: Pt able to maintain upright posture for 10 minutes  Current:   Goal: Pt to increase B hip/knee strength to 4/5 grossly to increase ease of transfers and performance of ADLs. Status at last note/certification: Bilateral knee flexion and extension: 3+/5, Hip flexion bilateral 3+/5, (standing)bilateral hip abduction 3+/5. Current:   Goal: Pt to increase standing/amb tolerance to 15 minutes to increase ease of meal preparation, performance of light household chores. Status at last note/certification: 5 minute tolerance  Current:  Goal: Pt to report < 4/10 pain at worst to increase ease with ADLs. Status at last note/certification: 2/31 pain at worst, while lying down on her back during her MRI. Current:  Goal: Pt to report FOTO score of 39 pts to show improved function and quality of life.   Status at last note/certification: FOTO 28 points   Current:       PLAN  [x]  Upgrade activities as tolerated     []  Continue plan of care  []  Update interventions per flow sheet       []  Discharge due to:_  []  Other:_      Christiano Schneider, DAMON 10/18/2022  3:53 PM    Future Appointments   Date Time Provider Frederic Sanders   10/20/2022  1:45 PM POOL RESOURCE [facility]   10/25/2022  2:15 PM Kathy Agosto, PT [facility]   10/27/2022  1:45 PM POOL RESOURCE [facility]   11/3/2022  4:00 PM Janna Ledezma MD [facility]

## 2022-10-20 ENCOUNTER — HOSPITAL ENCOUNTER (OUTPATIENT)
Dept: PHYSICAL THERAPY | Age: 47
Discharge: HOME | End: 2022-10-20
Attending: PHYSICAL MEDICINE & REHABILITATION
Payer: MEDICARE

## 2022-10-20 PROCEDURE — 97113 AQUATIC THERAPY/EXERCISES: CPT

## 2022-10-25 ENCOUNTER — APPOINTMENT (OUTPATIENT)
Dept: PHYSICAL THERAPY | Age: 47
End: 2022-10-25
Attending: PHYSICAL MEDICINE & REHABILITATION
Payer: MEDICARE

## 2022-10-25 ENCOUNTER — HOSPITAL ENCOUNTER (OUTPATIENT)
Dept: PHYSICAL THERAPY | Age: 47
Discharge: HOME | End: 2022-10-25
Attending: PHYSICAL MEDICINE & REHABILITATION
Payer: MEDICARE

## 2022-10-25 PROCEDURE — 97112 NEUROMUSCULAR REEDUCATION: CPT

## 2022-10-25 PROCEDURE — 97110 THERAPEUTIC EXERCISES: CPT

## 2022-10-25 PROCEDURE — 97530 THERAPEUTIC ACTIVITIES: CPT

## 2022-10-25 NOTE — PROGRESS NOTES
PT DAILY TREATMENT NOTE     Patient Name: Aleksey Monroe  Date:10/25/2022  : 1975  [x]  Patient  Verified  Payor: Yandy Cruz / Plan: [facility] / Product Type: Managed Care Medicare /    In time:1423 pm  Out time:1514 pm  Total Treatment Time (min): 51  Visit #: 7 of 10    Medicare/BCBS Only   Total Timed Codes (min):  38 1:1 Treatment Time:  38       Treatment Area: Cervicalgia [M54.2]  Other low back pain [M54.59]    SUBJECTIVE  Pain Level (0-10 scale): 6  Any medication changes, allergies to medications, adverse drug reactions, diagnosis change, or new procedure performed?: [x] No    [] Yes (see summary sheet for update)  Subjective functional status/changes:   [] No changes reported  Pt reports pain in low back and neck    OBJECTIVE    Modality rationale: decrease pain and increase tissue extensibility to improve the patients ability to perform ADLs.    Min Type Additional Details    [] Estim:  []Unatt       []IFC  []Premod                        []Other:  []w/ice   []w/heat  Position:   Location:     [] Estim: []Att    []TENS instruct  []NMES                    []Other:  []w/US   []w/ice   []w/heat  Position:   Location:     []  Traction: [] Cervical       []Lumbar                       [] Prone          []Supine                       []Intermittent   []Continuous Lbs:  [] before manual  [] after manual    []  Ultrasound: []Continuous   [] Pulsed                           []1MHz   []3MHz W/cm2:  Location:    []  Iontophoresis with dexamethasone         Location: [] Take home patch   [] In clinic   10' + 3' set up []  Ice     [x]  heat  []  Ice massage  []  Laser   []  Anodyne Position: right side-lying with pillow at knees  Location: low back     []  Laser with stim  []  Other:  Position:  Location:    []  Vasopneumatic Device    []  Right     []  Left  Pre-treatment girth:  Post-treatment girth:  Measured at (location):  Pressure:       [] lo [] med [] hi   Temperature: [] lo [] med [] hi   [x] Skin assessment post-treatment:  [x]intact []redness- no adverse reaction    []redness - adverse reaction:     22 min Therapeutic Exercise:  [x] See flow sheet :   Rationale: increase ROM and increase strength to improve LE strength/mobility and  lumbar mobility to improve ease of ADLs and functional activities. 8 min Therapeutic Activity:  [x]  See flow sheet :   Rationale: increase strength, improve coordination, improve balance, and increase proprioception  to improve the patients ability to perform transfers, stair negotiation, functional lifts, and functional carries. Pt education: importance of compliance with HEP     8 min Neuromuscular Re-education:  [x]  See flow sheet :   Rationale: increase strength, improve coordination, improve balance and increase proprioception  to improve the patients ability to perform functional tasks with improved abdominal brace utilization, improved control. With   [x] TE   [x] TA   [x] neuro   [] other: Patient Education: [x] Review HEP    [] Progressed/Changed HEP based on:   [] positioning   [] body mechanics   [] transfers   [] heat/ice application    [] other:      Other Objective/Functional Measures:   Pain at worst in the last week: 10/10; on average: 3-4/10   Subjective % improvement since SOC: 70%  Functional improvement: \"I've got my good days and my bad days\", Pt reports improved ability to balance, stand up straight and posture in the pool  Functional limitations: pt reports pain wakes patient out of her sleep, \"sitting for a long time and laying in bed over night - I'm very stiff, it takes me a minute to loosen up\"      Strength:   Right (/5) Left (/5)   Hip     Flexion 3 to 3+ 3 to 3+             Abduction 3 to 3+ 3 to 3+        Pain Level (0-10 scale) post treatment: 0 \"stiff\"    ASSESSMENT/Changes in Function: No significant progress observed in hip strength with MMT testing.  Pt requires encouragement to participate throughout session to perform exercises. She reports relief with LE's elevated (hamstring stretch in supine and LE's on swiss ball). She may continue to benefit from skilled OP PT services to continue to work toward set goals and to establish and educate patient in updated, comprehensive HEP to continue to manage care independently after discharge. Patient will continue to benefit from skilled PT services to modify and progress therapeutic interventions, address functional mobility deficits, address ROM deficits, address strength deficits, analyze and address soft tissue restrictions, analyze and cue movement patterns, analyze and modify body mechanics/ergonomics, assess and modify postural abnormalities and address imbalance/dizziness to attain remaining goals and improve overall function. []  See Plan of Care  []  See progress note/recertification  []  See Discharge Summary         Progress towards goals / Updated goals:  Goal: Pt to be able to stand erect consistently during therapy sessions without pain to reduce strain to low back with standing/amb activities. Status at last note/certification: Pt able to maintain upright posture for 10 minutes  Current:   Goal: Pt to increase B hip/knee strength to 4/5 grossly to increase ease of transfers and performance of ADLs. Status at last note/certification: Bilateral knee flexion and extension: 3+/5, Hip flexion bilateral 3+/5, (standing)bilateral hip abduction 3+/5. Current:   Strength (10/25/22) not met:   Right (/5) Left (/5)   Hip     Flexion 3 to 3+ 3 to 3+             Abduction 3 to 3+ 3 to 3+     Goal: Pt to increase standing/amb tolerance to 15 minutes to increase ease of meal preparation, performance of light household chores. Status at last note/certification: 5 minute tolerance  Current:  Goal: Pt to report < 4/10 pain at worst to increase ease with ADLs.   Status at last note/certification: 2/59 pain at worst, while lying down on her back during her MRI.  Current:Pain at worst in the last week: 10/10; on average: 3-4/10 (10/25/22) not met   Goal: Pt to report FOTO score of 39 pts to show improved function and quality of life.   Status at last note/certification: FOTO 28 points   Current:      PLAN  [x]  Upgrade activities as tolerated     [x]  Continue plan of care  []  Update interventions per flow sheet       []  Discharge due to:_  []  Other:_      Joana Christina, PT 10/25/2022  3:30 PM     Future Appointments   Date Time Provider Frederic Sanders   10/27/2022  1:45 PM POOL [facility]   11/3/2022  4:00 PM Elena Montilla MD [facility]

## 2022-10-27 ENCOUNTER — APPOINTMENT (OUTPATIENT)
Dept: PHYSICAL THERAPY | Age: 47
End: 2022-10-27
Attending: PHYSICAL MEDICINE & REHABILITATION
Payer: MEDICARE

## 2022-11-01 ENCOUNTER — APPOINTMENT (OUTPATIENT)
Dept: PHYSICAL THERAPY | Age: 47
End: 2022-11-01
Attending: PHYSICAL MEDICINE & REHABILITATION
Payer: MEDICARE

## 2022-11-01 ENCOUNTER — TELEPHONE (OUTPATIENT)
Dept: PHYSICAL THERAPY | Age: 47
End: 2022-11-01

## 2022-11-03 ENCOUNTER — OFFICE VISIT (OUTPATIENT)
Dept: ORTHOPEDIC SURGERY | Age: 47
End: 2022-11-03
Payer: MEDICARE

## 2022-11-03 VITALS
RESPIRATION RATE: 18 BRPM | HEIGHT: 60 IN | HEART RATE: 81 BPM | OXYGEN SATURATION: 100 % | DIASTOLIC BLOOD PRESSURE: 86 MMHG | TEMPERATURE: 97.5 F | BODY MASS INDEX: 46.33 KG/M2 | SYSTOLIC BLOOD PRESSURE: 135 MMHG | WEIGHT: 236 LBS

## 2022-11-03 DIAGNOSIS — M47.816 LUMBAR FACET ARTHROPATHY: Primary | ICD-10-CM

## 2022-11-03 DIAGNOSIS — M50.30 DDD (DEGENERATIVE DISC DISEASE), CERVICAL: ICD-10-CM

## 2022-11-03 DIAGNOSIS — M51.36 DDD (DEGENERATIVE DISC DISEASE), LUMBAR: ICD-10-CM

## 2022-11-03 DIAGNOSIS — M54.12 LEFT CERVICAL RADICULOPATHY: ICD-10-CM

## 2022-11-03 DIAGNOSIS — M70.61 GREATER TROCHANTERIC BURSITIS OF RIGHT HIP: ICD-10-CM

## 2022-11-03 PROCEDURE — G8427 DOCREV CUR MEDS BY ELIG CLIN: HCPCS | Performed by: PHYSICAL MEDICINE & REHABILITATION

## 2022-11-03 PROCEDURE — G8432 DEP SCR NOT DOC, RNG: HCPCS | Performed by: PHYSICAL MEDICINE & REHABILITATION

## 2022-11-03 PROCEDURE — G8417 CALC BMI ABV UP PARAM F/U: HCPCS | Performed by: PHYSICAL MEDICINE & REHABILITATION

## 2022-11-03 PROCEDURE — 99214 OFFICE O/P EST MOD 30 MIN: CPT | Performed by: PHYSICAL MEDICINE & REHABILITATION

## 2022-11-03 RX ORDER — LIDOCAINE 50 MG/G
PATCH TOPICAL
Qty: 60 EACH | Refills: 2 | Status: SHIPPED | OUTPATIENT
Start: 2022-11-03

## 2022-11-03 RX ORDER — GABAPENTIN 300 MG/1
300 CAPSULE ORAL 2 TIMES DAILY
Qty: 180 CAPSULE | Refills: 0 | Status: SHIPPED | OUTPATIENT
Start: 2022-11-03

## 2022-11-03 RX ORDER — DICLOFENAC SODIUM 10 MG/G
2 GEL TOPICAL 4 TIMES DAILY
Qty: 100 G | Refills: 2 | Status: SHIPPED | OUTPATIENT
Start: 2022-11-03

## 2022-11-03 RX ORDER — QUETIAPINE FUMARATE 25 MG/1
TABLET, FILM COATED ORAL
COMMUNITY
Start: 2022-10-11

## 2022-11-03 RX ORDER — SUCRALFATE 1 G/1
1 TABLET ORAL
COMMUNITY
Start: 2022-10-05

## 2022-11-03 NOTE — PROGRESS NOTES
Celestecherise Chairez presents today for   Chief Complaint   Patient presents with    Back Pain    Neck Pain       Is someone accompanying this pt? no    Is the patient using any DME equipment during OV? no    Depression Screening:  No flowsheet data found. Learning Assessment:  Learning Assessment 6/14/2019   PRIMARY LEARNER Patient   BARRIERS PRIMARY LEARNER NONE   PRIMARY LANGUAGE ENGLISH   LEARNER PREFERENCE PRIMARY DEMONSTRATION     LISTENING     READING   ANSWERED BY self   RELATIONSHIP SELF       Abuse Screening:  No flowsheet data found. Fall Risk  No flowsheet data found. OPIOID RISK TOOL  No flowsheet data found. Coordination of Care:  1. Have you been to the ER, urgent care clinic since your last visit? no  Hospitalized since your last visit? no    2. Have you seen or consulted any other health care providers outside of the [address] [place] [address] since your last visit? no Include any pap smears or colon screening.  no

## 2022-11-03 NOTE — PROGRESS NOTES
[address], [address]  [place], [address] [place] [address] Street  Phone: [phone]  Fax: [phone]        Emily Enamorado  : 1975  PCP: Calli Medina MD    PROGRESS NOTE      ASSESSMENT AND PLAN    Diagnoses and all orders for this visit:    1. Lumbar facet arthropathy    2. Greater trochanteric bursitis of right hip    3. DDD (degenerative disc disease), cervical  -     gabapentin (NEURONTIN) 300 mg capsule; Take 1 Capsule by mouth two (2) times a day. Max Daily Amount: 600 mg.  -     lidocaine (LIDODERM) 5 %; APPLY 1-2 PATCHES DAILY TO AFFECTED AREAS AS NEEDED FOR PAIN. 12 HOURS ON 12 HOURS OFF    4. DDD (degenerative disc disease), lumbar  -     lidocaine (LIDODERM) 5 %; APPLY 1-2 PATCHES DAILY TO AFFECTED AREAS AS NEEDED FOR PAIN. 12 HOURS ON 12 HOURS OFF    5. Left cervical radiculopathy  -     EMG ONE EXTREMITY UPPER LT; Future    Other orders  -     diclofenac (VOLTAREN) 1 % gel; Apply 2 g to affected area four (4) times daily. Alto Screen is a 50 y.o. female w/CLBP, mild deg changes on MRI. Trial of Voltaren Gel  RF Lidoderm patches  RF Gabapentin 300 bid  LUE EMG for numbness, tingling, and weakness    Follow-up and Dispositions    Return in 3 months (on 2/3/2023). HISTORY OF PRESENT ILLNESS      Karen Screen is a 50 y.o. female presents for follow up of back pain. L MRI reviewed with patient. Pt continues to have low back pain radiating into her R hip. Her pain increases with sitting, standing, and walking. Positive shopping cart sign. Denies numbness or tingling BLE. She also complains of neck pain. She has numbness, tingling, and weakness in her LUE. Pt takes Gabapentin 300 mg BID with some benefit, though it causes drowsiness. She is currently in physical therapy, which helps. Asking me for letter endorsing a buttock reduction to \"help with her pain. \"    Pain Assessment  11/3/2022   Location of Pain Back   Severity of Pain 6   Quality of Pain Sharp;Dull   Quality of Pain Comment -   Duration of Pain Persistent   Frequency of Pain Constant   Aggravating Factors Stretching;Straightening;Bending;Exercise;Kneeling;Squatting;Standing;Walking;Stairs   Aggravating Factors Comment -   Limiting Behavior Yes   Relieving Factors Other (Comment)   Relieving Factors Comment sitting upside down   Result of Injury No   Work-Related Injury -   Type of Injury -   Type of Injury Comment -       Investigations:   L MRI L 9/2022:  mild disc bulge L5/S1,  mild facet inflammation  XR 7/2022: left SIJ changes and mild disc height loss L5-S1  C XR 2/2022: DDD C5/6/7  C MRI 2020: mild stenosis C4/5/6 (L > R)  Spine surgery consult: unknown     Treatments:  Physical therapy: 10/2022 for neck, 9/2022 for neck/LB with benefit, 2020 aqua PT   Spinal injections: no  Spinal surgery- no  Beneficial medications: Tylenol, Lidoderm patches, Gabapentin - helps but promotes somnolence  Failed medications: NSAIDs - renal issues     Work Status: on disability since 2019  Pertinent PMHx:  ADHD, CKD, HTN, renal cancer, PTSD, posterior cervical lymphadenopathy, ESR 55 6/2022, CODI negative 6/2022, esophageal dilation 10/2022    PHYSICAL EXAMINATION    Visit Vitals  /86 (BP 1 Location: Right arm, BP Patient Position: Sitting, BP Cuff Size: Adult)   Pulse 81   Temp 97.5 °F (36.4 °C) (Temporal)   Resp 18   Ht 5' (1.524 m)   Wt 236 lb (107 kg)   SpO2 100% Comment: RA   BMI 46.09 kg/m²       TTP L4-5, right trochanteric bursa  Negative Logan's   Ambulatory without AD, non antalgic gait  Forward flexed                Written by Aida Meehan, as dictated by Josué Zavala MD.

## 2022-11-08 ENCOUNTER — HOSPITAL ENCOUNTER (OUTPATIENT)
Dept: PHYSICAL THERAPY | Age: 47
Discharge: HOME | End: 2022-11-08
Attending: PHYSICAL MEDICINE & REHABILITATION
Payer: MEDICARE

## 2022-11-08 PROCEDURE — 97113 AQUATIC THERAPY/EXERCISES: CPT

## 2022-11-08 NOTE — PROGRESS NOTES
[facility] - [facility], [facility]  [phone] fax    Continued Plan of Care/ Re-certification for Physical Therapy Services      Patient name: Celeste Chairez Start of Care: 2022   Referral source: Sudha Gilbert MD : 1975   Medical/Treatment Diagnosis: Cervicalgia [M54.2]  Other low back pain [M54.59]  Payor: Ani Vazquez / Plan: [place] / Product Type: Managed Care Medicare /  Onset Date:22 (Script)     Prior Hospitalization: see medical history Provider#: [id number]   Medications: Verified on Patient Summary List    Comorbidities:   Arthritis; Asthma; Back pain - scoliosis; hx CA - in remission; Depression; HTN; Schizoaffective disorder; PTSD; ADHD; Lymphedema LEs - finished treatment [facility]  Prior Level of Function:Not working, lives in [facility] apt alone;     Visits from Start of Care: 14    Missed Visits: 1    The Plan of Care and following information is based on the patient's current status:    Goal: Pt to be able to stand erect consistently during therapy sessions without pain to reduce strain to low back with standing/amb activities. Status at last note/certification: Pt able to maintain upright posture for 10 minutes  Current: progressing: Pt able to maintain upright posture for 15-20 minutes (2022)  Goal: Pt to increase B hip/knee strength to 4/5 grossly to increase ease of transfers and performance of ADLs. Status at last note/certification: Bilateral knee flexion and extension: 3+/5, Hip flexion bilateral 3+/5, (standing)bilateral hip abduction 3+/5. Current:   Strength (10/25/22) not met:    Right (/5) Left (/5)   Hip     Flexion 3 to 3+ 3 to 3+             Abduction 3 to 3+ 3 to 3+      Goal: Pt to increase standing/amb tolerance to 15 minutes to increase ease of meal preparation, performance of light household chores.   Status at last note/certification: 5 minute tolerance  Current: progressin-10 minute standing tolerance. (2022)  Goal: Pt to report < 4/10 pain at worst to increase ease with ADLs. Status at last note/certification: 60 pain at worst, while lying down on her back during her MRI. Current: not met: Pain at worst in the last week: 10/10; on average: 3-4/10 (10/25/22)   Goal: Pt to report FOTO score of 39 pts to show improved function and quality of life. Status at last note/certification: FOTO 28 points   Current: progressing: FOTO 32 points (2022)    Key functional changes: Pt has attended  14 sessions of skilled therapy and is making progress towards her LTGs. Her functional gains include greater ease with light ADLs and performing sit to stand transfers. She also notes improved standing balance and is able to maintain a more upright posture for longer periods of time. Her functional deficits include inability to sit for longer than 1 hour and increased stiffness in the lower back and legs first thing in the morning. Her max pain level is 10/10 with prolonged standing. Her best pain level is a 3/10 at rest. She gives herself a 70 percent self reported improvement since starting therapy. Pt expressed interest in the post rehab program to continue long term progress. Pt will transition to land therapy for the duration of her treatment, in preparation for her final HEP.       Problems/ barriers to goal attainment: none     Problem List: pain affecting function, decrease ROM, decrease strength, edema affecting function, impaired gait/ balance, decrease ADL/ functional abilitiies, decrease activity tolerance, decrease flexibility/ joint mobility, and decrease transfer abilities    Treatment Plan: Therapeutic exercise, Neuromuscular reeducation, Manual therapy, Therapeutic activity, Self care/home management, Electric stim unattended , Vasopneumatic device, Aquatic therapy, Gait training, Ultrasound, Mechanical traction, and Electric stim attended     Patient Goal (s) has been updated and includes: \"I want to build my core and leg strength\"     Goals for this certification period to be accomplished in 4 weeks:  Goal: Pt to be able to stand erect consistently during therapy sessions without pain to reduce strain to low back with standing/amb activities. Status at last note/certification: Pt able to maintain upright posture for 15-20 minutes (2022)  Current:   Goal: Pt to increase B hip/knee strength to 4/5 grossly to increase ease of transfers and performance of ADLs. Status at last note/certification:   Strength (10/25/22) not met:    Right (/5) Left (/5)   Hip     Flexion 3 to 3+ 3 to 3+             Abduction 3 to 3+ 3 to 3+     Current:   Goal: Pt to increase standing/amb tolerance to 15 minutes to increase ease of meal preparation, performance of light household chores. Status at last note/certification: progressin-10 minute standing tolerance. (2022)  Current:  Goal: Pt to report < 4/10 pain at worst to increase ease with ADLs. Status at last note/certification: not met: Pain at worst in the last week: 10/10; on average: 3-4/10 (10/25/22)    Current:  Goal: Pt to report FOTO score of 39 pts to show improved function and quality of life. Status at last note/certification: progressing: FOTO 32 points (2022)  Current:     Frequency / Duration: Patient to be seen 1 times per week for 4 weeks:    Assessment / Recommendations:Continued therapy recommended to improve standing endurance and functional LE strength to improve ease with daily tasks/ ADLs. Certification Period: 2022- 2022    Lalita Rai, PT 2022 3:05 PM    ________________________________________________________________________  I certify that the above Therapy Services are being furnished while the patient is under my care. I agree with the treatment plan and certify that this therapy is necessary.     [] I have read the above and request that my patient continue as recommended.   [] I have read the above report and request that my patient continue therapy with the following changes/special instructions: ______________________________________  [] I have read the above report and request that my patient be discharged from therapy    Physician's Signature:____________Date:_________TIME:________     Robert Sarabia MD  ** Signature, Date and Time must be completed for valid certification **    Please sign and return to [facility] - [address]  [phone] fax

## 2022-11-10 DIAGNOSIS — M54.12 LEFT CERVICAL RADICULOPATHY: ICD-10-CM

## 2022-11-16 ENCOUNTER — HOSPITAL ENCOUNTER (OUTPATIENT)
Dept: PHYSICAL THERAPY | Age: 47
Discharge: HOME | End: 2022-11-16
Attending: PHYSICAL MEDICINE & REHABILITATION
Payer: MEDICARE

## 2022-11-16 PROCEDURE — 97140 MANUAL THERAPY 1/> REGIONS: CPT

## 2022-11-16 PROCEDURE — 97110 THERAPEUTIC EXERCISES: CPT

## 2022-11-16 PROCEDURE — 97112 NEUROMUSCULAR REEDUCATION: CPT

## 2022-11-16 NOTE — PROGRESS NOTES
PT DAILY TREATMENT NOTE     Patient Name: Rodolfo Noe  Date:2022  : 1975  [x]  Patient  Verified  Payor: Yuliya Ruiz / Plan: [employer] / Product Type: Managed Care Medicare /    In time:12:03  Out time:12:58  Total Treatment Time (min): 55  Visit #: 2 of 4    Medicare/BCBS Only   Total Timed Codes (min):  45 1:1 Treatment Time:  45       Treatment Area: Cervicalgia [M54.2]  Other low back pain [M54.59]    SUBJECTIVE  Pain Level (0-10 scale): 5  Any medication changes, allergies to medications, adverse drug reactions, diagnosis change, or new procedure performed?: [x] No    [] Yes (see summary sheet for update)  Subjective functional status/changes:   [] No changes reported  I'm doing well today, my pain isn't too bad. OBJECTIVE    Modality rationale: decrease pain and increase tissue extensibility to improve the patients ability to perform ADLs for longer. Min Type Additional Details   10 [x] Estim:  []Unatt       [x]IFC  []Premod                        []Other:  []w/ice   [x]w/heat  Position:  left sidelying  Location: lower back.      [] Estim: []Att    []TENS instruct  []NMES                    []Other:  []w/US   []w/ice   []w/heat  Position:  Location:    []  Traction: [] Cervical       []Lumbar                       [] Prone          []Supine                       []Intermittent   []Continuous Lbs:  [] before manual  [] after manual    []  Ultrasound: []Continuous   [] Pulsed                           []1MHz   []3MHz W/cm2:  Location:    []  Iontophoresis with dexamethasone         Location: [] Take home patch   [] In clinic    []  Ice     []  heat  []  Ice massage  []  Laser   []  Anodyne Position:  Location:    []  Laser with stim  []  Other:  Position:  Location:    []  Vasopneumatic Device    []  Right     []  Left  Pre-treatment girth:  Post-treatment girth:  Measured at (location):  Pressure:       [] lo [] med [] hi   Temperature: [] lo [] med [] hi [x] Skin assessment post-treatment:  [x]intact []redness- no adverse reaction    []redness - adverse reaction:     15 min Therapeutic Exercise:  [] See flow sheet :   Rationale: increase ROM and increase strength to improve the patients ability to stand for longer periods. 20 min Neuromuscular Re-education:  []  See flow sheet :   Rationale: improve coordination, improve balance, and increase proprioception  to improve the patients ability to maintain proper posture during ADLs. 10 min Manual Therapy:  Seated: STM to the bilateral upper traps/ rhomboids. TPR to the bilateral levator scapulae. The manual therapy interventions were performed at a separate and distinct time from the therapeutic activities interventions. Rationale: decrease pain, increase ROM, increase tissue extensibility, and decrease trigger points to turn her head. With   [x] TE   [] TA   [x] neuro   [] other: Patient Education: [x] Review HEP    [] Progressed/Changed HEP based on:   [] positioning   [] body mechanics   [] transfers   [] heat/ice application    [] other:      Other Objective/Functional Measures: exercises per chart. Pain Level (0-10 scale) post treatment: 1    ASSESSMENT/Changes in Function: Pt reports to skilled therapy session with decreased standing tolerance and poor functional LE strength. Tactile cues were provided during sidelying clamshells to engage the gluteus medius and increase standing endurance. Pt tolerated the addition of paloff presses but required a sitting rest break during the set due to increases in systemic fatigue. Pt received new HEP exercises and verbalized understanding. Session tolerated well and concluded with E-stim to the lower back.      Patient will continue to benefit from skilled PT services to modify and progress therapeutic interventions, address functional mobility deficits, address ROM deficits, address strength deficits, analyze and address soft tissue restrictions, analyze and cue movement patterns, analyze and modify body mechanics/ergonomics, and assess and modify postural abnormalities to attain remaining goals. [x]  See Plan of Care  []  See progress note/recertification  []  See Discharge Summary         Progress towards goals / Updated goals:  Goal: Pt to be able to stand erect consistently during therapy sessions without pain to reduce strain to low back with standing/amb activities. Status at last note/certification: Pt able to maintain upright posture for 15-20 minutes (2022)  Current:   Goal: Pt to increase B hip/knee strength to 4/5 grossly to increase ease of transfers and performance of ADLs. Status at last note/certification:   Strength (10/25/22) not met:    Right (/5) Left (/5)   Hip     Flexion 3 to 3+ 3 to 3+             Abduction 3 to 3+ 3 to 3+      Current:   Goal: Pt to increase standing/amb tolerance to 15 minutes to increase ease of meal preparation, performance of light household chores. Status at last note/certification: progressin-10 minute standing tolerance. (2022)  Current:10 minute standing tolerance. (2022)  Goal: Pt to report < 4/10 pain at worst to increase ease with ADLs. Status at last note/certification: not met: Pain at worst in the last week: 10/10; on average: 3-4/10 (10/25/22)    Current: 6/10 pain the past week. (2022)  Goal: Pt to report FOTO score of 39 pts to show improved function and quality of life.   Status at last note/certification: progressing: FOTO 32 points (2022)  Current:        PLAN  [x]  Upgrade activities as tolerated     [x]  Continue plan of care  []  Update interventions per flow sheet       []  Discharge due to:_  []  Other:_      Jayden Carmona PTA 2022  12:09 PM    Future Appointments   Date Time Provider Frederic Sanders   2022  3:00 PM Paola Montgomery PTA [facility]   2022  3:00 PM Will Rai, PT [facility]   2023  4:00 PM Keisha Palomo, Thom Ross MD [facility]

## 2022-11-23 ENCOUNTER — HOSPITAL ENCOUNTER (OUTPATIENT)
Dept: PHYSICAL THERAPY | Age: 47
Discharge: HOME | End: 2022-11-23
Attending: PHYSICAL MEDICINE & REHABILITATION
Payer: MEDICARE

## 2022-11-23 PROCEDURE — 97110 THERAPEUTIC EXERCISES: CPT

## 2022-11-23 PROCEDURE — 97112 NEUROMUSCULAR REEDUCATION: CPT

## 2022-11-23 NOTE — PROGRESS NOTES
PT DAILY TREATMENT NOTE     Patient Name: Omi Maria  Date:2022  : 1975  [x]  Patient  Verified  Payor: Leon Burn / [facility] [address] [facility] / Product Type: Managed Care Medicare /    In time:3:02  Out time:4:00  Total Treatment Time (min): 58  Visit #: 3 of 4    Medicare/BCBS Only   Total Timed Codes (min):  48 1:1 Treatment Time:  48       Treatment Area: Cervicalgia [M54.2]  Other low back pain [M54.59]    SUBJECTIVE  Pain Level (0-10 scale): 0  Any medication changes, allergies to medications, adverse drug reactions, diagnosis change, or new procedure performed?: [x] No    [] Yes (see summary sheet for update)  Subjective functional status/changes:   [] No changes reported  I'm not in any pain at the moment just stiff. OBJECTIVE    Modality rationale: decrease pain and increase tissue extensibility to improve the patients ability to perform ALDs for longer. Min Type Additional Details   10 [] Estim:  []Unatt       []IFC  [x]Premod                        []Other:  []w/ice   [x]w/heat  Position: left sidelying. Location: upper traps/ lower traps. [] Estim: []Att    []TENS instruct  []NMES                    []Other:  []w/US   []w/ice   []w/heat  Position:  Location:    []  Traction: [] Cervical       []Lumbar                       [] Prone          []Supine                       []Intermittent   []Continuous Lbs:  [] before manual  [] after manual    []  Ultrasound: []Continuous   [] Pulsed                           []1MHz   []3MHz W/cm2:  Location:    []  Iontophoresis with dexamethasone         Location: [] Take home patch   [] In clinic   10 []  Ice     [x]  heat  []  Ice massage  []  Laser   []  Anodyne Position: left side lying. Location:  lower back.      []  Laser with stim  []  Other:  Position:  Location:    []  Vasopneumatic Device    []  Right     []  Left  Pre-treatment girth:  Post-treatment girth:  Measured at (location):  Pressure:       [] lo [] med [] hi   Temperature: [] lo [] med [] hi   [x] Skin assessment post-treatment:  [x]intact []redness- no adverse reaction    []redness - adverse reaction:     20 min Therapeutic Exercise:  [] See flow sheet :   Rationale: increase ROM and increase strength to improve the patients ability to bend, lift and squat. 28 min Neuromuscular Re-education:  []  See flow sheet :   Rationale: increase ROM, improve coordination, and increase proprioception  to improve the patients ability to maintain proper posture with ADLs. With   [x] TE   [] TA   [x] neuro   [] other: Patient Education: [x] Review HEP    [] Progressed/Changed HEP based on:   [] positioning   [] body mechanics   [] transfers   [] heat/ice application    [] other:      Other Objective/Functional Measures: exercises per chart. Pain Level (0-10 scale) post treatment: 0    ASSESSMENT/Changes in Function: Pt reports to skilled therapy session with increased functional LE strength and decreased standing/ walking tolerance. Pt tolerated the addition  of bilateral knee fallouts with theraband to engage the TA and improve standing tolerance. She was unable to tolerate supine for extended periods of time due to increased tightness, but experienced relief with seated swiss ball flexion. Pt received new HEP exercises and verbalized understanding of exercise frequency and performance. Session tolerated well. Patient will continue to benefit from skilled PT services to modify and progress therapeutic interventions, address functional mobility deficits, address ROM deficits, address strength deficits, analyze and address soft tissue restrictions, analyze and cue movement patterns, analyze and modify body mechanics/ergonomics, and assess and modify postural abnormalities to attain remaining goals.      [x]  See Plan of Care  []  See progress note/recertification  []  See Discharge Summary         Progress towards goals / Updated goals:  Goal: Pt to be able to stand erect consistently during therapy sessions without pain to reduce strain to low back with standing/amb activities. Status at last note/certification: Pt able to maintain upright posture for 15-20 minutes (2022)  Current:   Goal: Pt to increase B hip/knee strength to 4/5 grossly to increase ease of transfers and performance of ADLs. Status at last note/certification:   Strength (10/25/22) not met:    Right (/5) Left (/5)   Hip     Flexion 3 to 3+ 3 to 3+             Abduction 3 to 3+ 3 to 3+      Current: not met: (2022)    Right (/5) Left (/5)   Hip     Flexion 3 to 3+ 3 to 3+             Abduction 3 to 3+ 3 to 3+     Goal: Pt to increase standing/amb tolerance to 15 minutes to increase ease of meal preparation, performance of light household chores. Status at last note/certification: progressin-10 minute standing tolerance. (2022)  Current:10 minute standing tolerance. (2022)  Goal: Pt to report < 4/10 pain at worst to increase ease with ADLs. Status at last note/certification: not met: Pain at worst in the last week: 10/10; on average: 3-4/10 (10/25/22)    Current: 3-4/10 pain the past week. (2022)  Goal: Pt to report FOTO score of 39 pts to show improved function and quality of life.   Status at last note/certification: progressing: FOTO 32 points (2022)  Current:        PLAN  [x]  Upgrade activities as tolerated     [x]  Continue plan of care  []  Update interventions per flow sheet       []  Discharge due to:_  []  Other:_      [place], PTA 2022  3:06 PM    Future Appointments   Date Time Provider Frederic Sanders   2022  3:00 PM Ivonne Rai, PT [facility]   2023  4:00 PM Sadaf De Anda MD [facility] AMB

## 2022-11-29 ENCOUNTER — APPOINTMENT (OUTPATIENT)
Dept: PHYSICAL THERAPY | Age: 47
End: 2022-11-29
Attending: PHYSICAL MEDICINE & REHABILITATION
Payer: MEDICARE

## 2022-11-30 ENCOUNTER — APPOINTMENT (OUTPATIENT)
Dept: PHYSICAL THERAPY | Age: 47
End: 2022-11-30
Attending: PHYSICAL MEDICINE & REHABILITATION
Payer: MEDICARE

## 2022-12-08 ENCOUNTER — HOSPITAL ENCOUNTER (OUTPATIENT)
Dept: PHYSICAL THERAPY | Age: 47
Discharge: HOME | End: 2022-12-08
Attending: PHYSICAL MEDICINE & REHABILITATION
Payer: MEDICARE

## 2022-12-08 PROCEDURE — 97535 SELF CARE MNGMENT TRAINING: CPT

## 2022-12-08 PROCEDURE — 97530 THERAPEUTIC ACTIVITIES: CPT

## 2022-12-08 NOTE — PROGRESS NOTES
PT DAILY TREATMENT NOTE     Patient Name: Shaun Nicholson  Date:2022  : 1975  [x]  Patient  Verified  Payor: Alexa Board / Plan: [place] [employer] / Product Type: Managed Care Medicare /    In time:3:50  Out time:4:50  Total Treatment Time (min): 60  Visit #: 1 of 4    Medicare/BCBS Only   Total Timed Codes (min):  60 1:1 Treatment Time:  60       Treatment Area: Cervicalgia [M54.2]  Other low back pain [M54.59]    SUBJECTIVE  Pain Level (0-10 scale): 0/10  Any medication changes, allergies to medications, adverse drug reactions, diagnosis change, or new procedure performed?: [x] No    [] Yes (see summary sheet for update)  Subjective functional status/changes:   [] No changes reported  \"I don't have any pain right now. I'm just stiff. \"    OBJECTIVE    30 min Therapeutic Exercise:  [] See flow sheet :   Rationale: increase ROM and increase strength to improve the patients ability to bend, lift and squat. 30 min Self Care/Home Management: ensured proper fit of newly received AFOs, suggested Pt contact MD regarding foam pad for inside heel of AFO for reducing rubbing, gait sequencing for heel to toe pattern for amb using AFOs, and updated HEP for aquatics for [employer] therapy pool performance on off days from PT to work towards independent, gym routine   Rationale: increase strength, improve coordination, and improve balance  to improve the patients ability to increase safety with gait, increase independence with functional strengthening to prepare for self-maintenance of condition        With   [x] TE   [] TA   [x] neuro   [] other: Patient Education: [x] Review HEP    [] Progressed/Changed HEP based on:   [] positioning   [] body mechanics   [] transfers   [] heat/ice application    [] other:      Other Objective/Functional Measures: Reassessed goals for re-certification note. Updated HEP for aquatic exercises to perform at [facility] for gym-based independent HEP.      Pain Level (0-10 scale) post treatment: 0/10    ASSESSMENT/Changes in Function: Pt has attended skilled therapy for 17 visits to address neck and lower back pain that has impacted postural positioning and upright standing tolerance, core/trunk stability, functional LE strength, and standing balance. Pt's functional gains include ability to stand more erect now x 20-25 minutes, since start of care, reduced pain overall with main complaint being stiffness. Pt's functional deficits include difficulty with prolonged standing/amb of 10 minutes, decreased endurance levels with standing activities, limiting ability to perform household chores and recreational activities. Pt has transitioned from aquatic based therapy to land based therapy to further improve functional strength, endurance, and balance to increase independence in home and community. Pt would continue to benefit from skilled therapy sessions to address aforementioned functional deficits and improve overall function and quality of life with less pain. Patient will continue to benefit from skilled PT services to modify and progress therapeutic interventions, address functional mobility deficits, address ROM deficits, address strength deficits, analyze and address soft tissue restrictions, analyze and cue movement patterns, analyze and modify body mechanics/ergonomics, and assess and modify postural abnormalities to attain remaining goals. []  See Plan of Care  [x]  See progress note/recertification  []  See Discharge Summary         Progress towards goals / Updated goals:  Goal: Pt to be able to stand erect consistently during therapy sessions without pain to reduce strain to low back with standing/amb activities.   Status at last note/certification: Pt able to maintain upright posture for 15-20 minutes (11/8/2022)  Current: progressing - able to stand upright x 20-25 minutes, improving further with issuance of and starting wearing of AFOs (12/8/22)  Goal: Pt to increase B hip/knee strength to 4/5 grossly to increase ease of transfers and performance of ADLs. Status at last note/certification:   Strength (10/25/22)     Right (/5) Left (/5)   Hip     Flexion 3 to 3+ 3 to 3+             Abduction 3 to 3+ 3 to 3+      Current: progressing - hip flexion 3+/5 B, hip ABD 3+/5 B (22)  Goal: Pt to increase standing/amb tolerance to 15 minutes to increase ease of meal preparation, performance of light household chores. Status at last note/certification: progressin-10 minute standing tolerance. (2022)  Current: progressing - 10 minute standing/amb tolerance (2022)  Goal: Pt to report < 4/10 pain at worst to increase ease with ADLs. Status at last note/certification: not met: Pain at worst in the last week: 10/10; on average: 3-4/10 (10/25/22)    Current: progressing - 5-6/10 pain at worst in the past week - moreso tightness than pain (2022)  Goal: Pt to report FOTO score of 39 pts to show improved function and quality of life.   Status at last note/certification: progressing: FOTO 32 points (2022)  Current: met - FOTO 48 pts (22)       PLAN  [x]  Upgrade activities as tolerated     [x]  Continue plan of care  []  Update interventions per flow sheet       []  Discharge due to:_  []  Other:_      Ina Prader Daughtry, PT 2022  3:06 PM    Future Appointments   Date Time Provider Frederic Sanders   2022  1:30 PM [facility]   2022  1:30 PM [facility]   2022  2:15 PM Daughtry, Ina Prader, PT [facility]   2023  4:00 PM Brittnee Roy MD [facility]

## 2022-12-08 NOTE — PROGRESS NOTES
[facility] - [facility], [address]  [phone] fax    Continued Plan of Care/ Re-certification for Physical Therapy Services      Patient name: Rodolfo Noe Start of Care: 2022   Referral source: Nixon Marques MD : 1975   Medical/Treatment Diagnosis: Cervicalgia [M54.2]  Other low back pain [M54.59]  Payor: Yuliya Ruiz / Plan: [employer] / Product Type: Managed Care Medicare /  Onset Date:22 (Script)     Prior Hospitalization: see medical history Provider#: [id number]   Medications: Verified on Patient Summary List    Comorbidities:   Arthritis; Asthma; Back pain - scoliosis; hx CA - in remission; Depression; HTN; Schizoaffective disorder; PTSD; ADHD; Lymphedema LEs - finished treatment at [facility]  Prior Level of Function:Not working, lives in [facility] apt alone;     Visits from Start of Care: 17    Missed Visits: 2    The Plan of Care and following information is based on the patient's current status:    Goal: Pt to be able to stand erect consistently during therapy sessions without pain to reduce strain to low back with standing/amb activities. Status at last note/certification: Pt able to maintain upright posture for 15-20 minutes (2022)  Current: progressing - able to stand upright x 20-25 minutes, improving further with issuance of and starting wearing of AFOs (22)  Goal: Pt to increase B hip/knee strength to 4/5 grossly to increase ease of transfers and performance of ADLs. Status at last note/certification:   Strength (10/25/22)     Right (/5) Left (/5)   Hip     Flexion 3 to 3+ 3 to 3+             Abduction 3 to 3+ 3 to 3+      Current: progressing - hip flexion 3+/5 B, hip ABD 3+/5 B (22)  Goal: Pt to increase standing/amb tolerance to 15 minutes to increase ease of meal preparation, performance of light household chores.   Status at last note/certification: progressing: 5-10 minute standing tolerance. (11/8/2022)  Current: progressing - 10 minute standing/amb tolerance (12/8/2022)  Goal: Pt to report < 4/10 pain at worst to increase ease with ADLs. Status at last note/certification: not met: Pain at worst in the last week: 10/10; on average: 3-4/10 (10/25/22)    Current: progressing - 5-6/10 pain at worst in the past week - moreso tightness than pain (12/8/2022)  Goal: Pt to report FOTO score of 39 pts to show improved function and quality of life. Status at last note/certification: progressing: FOTO 32 points (11/8/2022)  Current: met - FOTO 48 pts (12/8/22)    Key functional changes: Pt has attended skilled therapy for 17 visits to address neck and lower back pain that has impacted postural positioning and upright standing tolerance, core/trunk stability, functional LE strength, and standing balance. Pt's functional gains include ability to stand more erect now x 20-25 minutes, since start of care, reduced pain overall with main complaint being stiffness. Pt's functional deficits include difficulty with prolonged standing/amb of 10 minutes, decreased endurance levels with standing activities, limiting ability to perform household chores and recreational activities. Pt has transitioned from aquatic based therapy to land based therapy to further improve functional strength, endurance, and balance to increase independence in home and community.      Problems/ barriers to goal attainment: none     Problem List: pain affecting function, decrease ROM, decrease strength, edema affecting function, impaired gait/ balance, decrease ADL/ functional abilitiies, decrease activity tolerance, decrease flexibility/ joint mobility, and decrease transfer abilities    Treatment Plan: Therapeutic exercise, Neuromuscular reeducation, Manual therapy, Therapeutic activity, Self care/home management, Electric stim unattended , Vasopneumatic device, Aquatic therapy, Gait training, Ultrasound, Mechanical traction, and Electric stim attended     Patient Goal (s) has been updated and includes: \"I want to be independently working out at [employer]. \"     Goals for this certification period to be accomplished in 4 weeks:  Goal: Pt to be able to stand erect consistently during therapy sessions without pain to reduce strain to low back with standing/amb activities. Status at last note/certification: able to stand upright x 20-25 minutes, improving further with issuance of and starting wearing of AFOs (12/8/22)  Current:   Goal: Pt to increase B hip/knee strength to 4/5 grossly to increase ease of transfers and performance of ADLs. Status at last note/certification: hip flexion 3+/5 B, hip ABD 3+/5 B (12/8/22)  Current:   Goal: Pt to increase standing/amb tolerance to 15 minutes to increase ease of meal preparation, performance of light household chores. Status at last note/certification: 10 minute standing/amb tolerance (12/8/2022)  Current:   Goal: Pt to report < 4/10 pain at worst to increase ease with ADLs. Status at last note/certification: 0-0/98 pain at worst in the past week - moreso tightness than pain (12/8/2022)  Current:   Goal: Pt to report final FOTO score of 39 pts to show improved function and quality of life. Status at last note/certification: met - FOTO 48 pts (12/8/22)  Current:      Frequency / Duration: Patient to be seen 1 times per week for 4 weeks:    Assessment / Recommendations:  Pt would continue to benefit from skilled therapy sessions to address aforementioned functional deficits and improve overall function and quality of life with less pain. Certification Period: 12/8/2022- 1/6/2022    Lalita Rai, PT 12/8/2022 3:05 PM    ________________________________________________________________________  I certify that the above Therapy Services are being furnished while the patient is under my care. I agree with the treatment plan and certify that this therapy is necessary.     [] I have read the above and request that my patient continue as recommended.   [] I have read the above report and request that my patient continue therapy with the following changes/special instructions: ______________________________________  [] I have read the above report and request that my patient be discharged from therapy    Physician's Signature:____________Date:_________TIME:________     Elena Montilla MD  ** Signature, Date and Time must be completed for valid certification **    Please sign and return to [facility] - [address]  [phone] fax

## 2022-12-14 ENCOUNTER — HOSPITAL ENCOUNTER (OUTPATIENT)
Dept: PHYSICAL THERAPY | Age: 47
Discharge: HOME | End: 2022-12-14
Attending: PHYSICAL MEDICINE & REHABILITATION
Payer: MEDICARE

## 2022-12-14 PROCEDURE — 97110 THERAPEUTIC EXERCISES: CPT

## 2022-12-14 PROCEDURE — 97112 NEUROMUSCULAR REEDUCATION: CPT

## 2022-12-14 NOTE — PROGRESS NOTES
PT DAILY TREATMENT NOTE     Patient Name: Karrie Baugh  Date:2022  : 1975  [x]  Patient  Verified  Payor: Cindy Trinidads / Plan: [employer] / Product Type: Managed Care Medicare /    In time:1:45  Out time:2:40  Total Treatment Time (min): 55  Visit #: 2 of 4    Medicare/BCBS Only   Total Timed Codes (min):  45 1:1 Treatment Time:  45       Treatment Area: Cervicalgia [M54.2]  Other low back pain [M54.59]    SUBJECTIVE  Pain Level (0-10 scale): 0  Any medication changes, allergies to medications, adverse drug reactions, diagnosis change, or new procedure performed?: [x] No    [] Yes (see summary sheet for update)  Subjective functional status/changes:   [] No changes reported  I'm not in any pain at the moment my lower back just feels stiff. OBJECTIVE    Modality rationale: decrease pain and increase tissue extensibility to improve the patients ability to perform ADLs for longer. Min Type Additional Details   10 [x] Estim:  []Unatt       []IFC  [x]Premod                        []Other:  []w/ice   [x]w/heat  Position: left side lying   Location: lower back.      [] Estim: []Att    []TENS instruct  []NMES                    []Other:  []w/US   []w/ice   []w/heat  Position:  Location:    []  Traction: [] Cervical       []Lumbar                       [] Prone          []Supine                       []Intermittent   []Continuous Lbs:  [] before manual  [] after manual    []  Ultrasound: []Continuous   [] Pulsed                           []1MHz   []3MHz W/cm2:  Location:    []  Iontophoresis with dexamethasone         Location: [] Take home patch   [] In clinic    []  Ice     []  heat  []  Ice massage  []  Laser   []  Anodyne Position:  Location:    []  Laser with stim  []  Other:  Position:  Location:    []  Vasopneumatic Device    []  Right     []  Left  Pre-treatment girth:  Post-treatment girth:  Measured at (location):  Pressure:       [] lo [] med [] hi Temperature: [] lo [] med [] hi   [x] Skin assessment post-treatment:  [x]intact []redness- no adverse reaction    []redness - adverse reaction:     25 min Therapeutic Exercise:  [] See flow sheet :   Rationale: increase ROM and increase strength to improve the patients ability to bend, lift and squat. 20 min Neuromuscular Re-education:  []  See flow sheet :   Rationale: increase strength, improve coordination, and increase proprioception  to improve the patients ability to maintain proper posture during ADLs. With   [x] TE   [] TA   [x] neuro   [] other: Patient Education: [x] Review HEP    [] Progressed/Changed HEP based on:   [] positioning   [] body mechanics   [] transfers   [] heat/ice application    [] other:      Other Objective/Functional Measures: exercises per chart. Pain Level (0-10 scale) post treatment: 0    ASSESSMENT/Changes in Function: Pt reports to skilled therapy session with decreased functional LE strength and increased lower back pain with ADLs. Tactile cues were provided during supine marches with patient demonstrating proper sequencing following instruction. Pt described a tightness in her lower back following supine marches but experienced relief following swiss ball stretches. Therapist provided motivation to keep patient focused on task. Pt tolerated the addition of PNF D2 flexion to strengthen the lower trapezius and improve scapular stability. Session tolerated well and concluded with MHP and E-stim. Patient will continue to benefit from skilled PT services to modify and progress therapeutic interventions, address functional mobility deficits, address ROM deficits, address strength deficits, analyze and address soft tissue restrictions, analyze and cue movement patterns, analyze and modify body mechanics/ergonomics, and assess and modify postural abnormalities to attain remaining goals.      [x]  See Plan of Care  []  See progress note/recertification  []  See Discharge Summary         Progress towards goals / Updated goals:  Goal: Pt to be able to stand erect consistently during therapy sessions without pain to reduce strain to low back with standing/amb activities. Status at last note/certification: able to stand upright x 20-25 minutes, improving further with issuance of and starting wearing of AFOs (12/8/22)  Current:   Goal: Pt to increase B hip/knee strength to 4/5 grossly to increase ease of transfers and performance of ADLs. Status at last note/certification: hip flexion 3+/5 B, hip ABD 3+/5 B (12/8/22)  Current:   Goal: Pt to increase standing/amb tolerance to 15 minutes to increase ease of meal preparation, performance of light household chores. Status at last note/certification: 10 minute standing/amb tolerance (12/8/2022)  Current:   Goal: Pt to report < 4/10 pain at worst to increase ease with ADLs. Status at last note/certification: 8-5/34 pain at worst in the past week - moreso tightness than pain (12/8/2022)  Current: 7-8 pain at worst when performing laundry. (12/14/2022)  Goal: Pt to report final FOTO score of 39 pts to show improved function and quality of life.   Status at last note/certification: met - FOTO 48 pts (12/8/22)  Current:           PLAN  [x]  Upgrade activities as tolerated     [x]  Continue plan of care  []  Update interventions per flow sheet       []  Discharge due to:_  []  Other:_      Leno Benjamin, DAMON 12/14/2022  1:46 PM    Future Appointments   Date Time Provider Frederic Sanders   12/21/2022  1:30 PM Stacey Floyd [facility]   12/27/2022  2:15 PM Leonard Rai, PT [facility]   2/2/2023  4:00 PM Nikole Campbell MD [facility]

## 2022-12-21 ENCOUNTER — APPOINTMENT (OUTPATIENT)
Dept: PHYSICAL THERAPY | Age: 47
End: 2022-12-21
Attending: PHYSICAL MEDICINE & REHABILITATION
Payer: MEDICARE

## 2022-12-27 ENCOUNTER — APPOINTMENT (OUTPATIENT)
Dept: PHYSICAL THERAPY | Age: 47
End: 2022-12-27
Attending: PHYSICAL MEDICINE & REHABILITATION
Payer: MEDICARE

## 2023-01-12 ENCOUNTER — HOSPITAL ENCOUNTER (OUTPATIENT)
Dept: PHYSICAL THERAPY | Age: 48
Discharge: HOME | End: 2023-01-12
Attending: PHYSICAL MEDICINE & REHABILITATION
Payer: MEDICARE

## 2023-01-12 PROCEDURE — 97110 THERAPEUTIC EXERCISES: CPT

## 2023-01-12 NOTE — PROGRESS NOTES
PT DAILY TREATMENT NOTE     Patient Name: Colette Duran  Date:2023  : 1975  [x]  Patient  Verified  Payor: Cate Macias / Plan: [employer] / Product Type: Managed Care Medicare /    In time:11:41  Out time:12:30  Total Treatment Time (min): 39  Visit #: 3 of 4    Medicare/BCBS Only   Total Timed Codes (min):  39 1:1 Treatment Time:  15       Treatment Area: Cervicalgia [M54.2]  Other low back pain [M54.59]    SUBJECTIVE  Pain Level (0-10 scale): 4  Any medication changes, allergies to medications, adverse drug reactions, diagnosis change, or new procedure performed?: [x] No    [] Yes (see summary sheet for update)  Subjective functional status/changes:   [] No changes reported  I haven't been wearing my AFO's because they are irritating my skin. OBJECTIVE      19 min Therapeutic Exercise:  [] See flow sheet :   Rationale: increase ROM and increase strength to improve the patients ability to improve squats, lifting     20 min Neuromuscular Re-education:  []  See flow sheet :   Rationale: increase strength and improve coordination  to improve the patients ability to improve posture and ease of functional tasks    With   [] TE   [] TA   [] neuro   [] other: Patient Education: [x] Review HEP    [] Progressed/Changed HEP based on:   [] positioning   [] body mechanics   [] transfers   [] heat/ice application    [] other:      Other Objective/Functional Measures: ex's pr card     Pain Level (0-10 scale) post treatment: 2    ASSESSMENT/Changes in Function: pt sent today to address Cervicalgia [M54.2], Other low back pain [M54.59]  reports pain in neck from sleeping on sofa. Cues provided to correct technique.   No increased symptoms during session    Patient will continue to benefit from skilled PT services to modify and progress therapeutic interventions, address functional mobility deficits, address ROM deficits, address strength deficits, analyze and address soft tissue restrictions, analyze and cue movement patterns, analyze and modify body mechanics/ergonomics, assess and modify postural abnormalities, address imbalance/dizziness, and instruct in home and community integration to attain remaining goals. []  See Plan of Care  []  See progress note/recertification  []  See Discharge Summary         Progress towards goals / Updated goals:  Goal: Pt to be able to stand erect consistently during therapy sessions without pain to reduce strain to low back with standing/amb activities. Status at last note/certification: able to stand upright x 20-25 minutes, improving further with issuance of and starting wearing of AFOs (12/8/22)  Current:   Goal: Pt to increase B hip/knee strength to 4/5 grossly to increase ease of transfers and performance of ADLs. Status at last note/certification: hip flexion 3+/5 B, hip ABD 3+/5 B (12/8/22)  Current:   Goal: Pt to increase standing/amb tolerance to 15 minutes to increase ease of meal preparation, performance of light household chores. Status at last note/certification: 10 minute standing/amb tolerance (12/8/2022)  Current:   Goal: Pt to report < 4/10 pain at worst to increase ease with ADLs. Status at last note/certification: 3-6/36 pain at worst in the past week - moreso tightness than pain (12/8/2022)  Current: 7-8 pain at worst when performing laundry. (12/14/2022)  Goal: Pt to report final FOTO score of 39 pts to show improved function and quality of life.   Status at last note/certification: met - FOTO 48 pts (12/8/22)  Current:     PLAN  [x]  Upgrade activities as tolerated     []  Continue plan of care  []  Update interventions per flow sheet       []  Discharge due to:_  []  Other:_      Rosie Postin, PTA 1/12/2023  11:54 AM    Future Appointments   Date Time Provider Frederic Sanders   2/2/2023  4:00 PM Lali Currie MD [facility]

## 2023-01-17 ENCOUNTER — HOSPITAL ENCOUNTER (OUTPATIENT)
Dept: PHYSICAL THERAPY | Age: 48
Discharge: HOME | End: 2023-01-17
Attending: PHYSICAL MEDICINE & REHABILITATION
Payer: MEDICARE

## 2023-01-17 PROCEDURE — 97112 NEUROMUSCULAR REEDUCATION: CPT

## 2023-01-17 PROCEDURE — 97530 THERAPEUTIC ACTIVITIES: CPT

## 2023-01-17 NOTE — PROGRESS NOTES
Physical Therapy Discharge Instructions      [facility], [address] [place] [address]  [phone] fax    Patient: Radha Urias  : 1975      Continue Home Exercise Program at least 4-5 times per week. Rotate through the exercises (pick 6-7 for one day, 6-7 for the next day, etc). Continue with  Heat/ice: 10-20 minutes with appropriate layering to avoid skin burns/lesions- perform skin check during and after applying heat or ice       Follow up with MD:     [x] Upon completion of therapy     [] As needed      Recommendations:     [x]   Return to activity with home program    []   Return to activity with the following modifications:       []Post Rehab Program    [x]Join Independent aquatic post rehab program     []Return to/join local gym        Additional Comments: Please follow up with your doctors as needed. Call us if you have any questions.            Debbie Vega, PT 2023 12:59 PM

## 2023-01-17 NOTE — PROGRESS NOTES
PT DISCHARGE DAILY NOTE AND [id number]    Patient name: Zeyad Zee Start of Care:  2022   Referral source: Anay Edwards MD : 1975   Medical/Treatment Diagnosis: Cervicalgia [M54.2]  Other low back pain [M54.59] Onset Date:22 (script)     Prior Hospitalization: see medical history Provider#: [id number]   Medications: Verified on Patient Summary List    Comorbidities:  Arthritis; Asthma; Back pain - scoliosis; hx CA - in remission; Depression; HTN; Schizoaffective disorder; PTSD; ADHD; Lymphedema LEs - finished treatment at [facility]  Prior Level of Function:Not working, lives in duplex apt alone;     Visits from Start of Care: 20    Missed Visits since start of care: 3    Reporting Period : 22 to 23     Date:2023  : 1975  [x]  Patient  Verified  Payor: K[employer] / Plan: [employer] / Product Type: Managed Care Medicare /    In time:1241 pm  Out time:1311 pm  Total Treatment Time (min): 30  Visit #: 4 of 4    Medicare/BCBS Only   Total Timed Codes (min):  30 1:1 Treatment Time:  30       SUBJECTIVE  Pain Level (0-10 scale): 6  Any medication changes, allergies to medications, adverse drug reactions, diagnosis change, or new procedure performed?: [x] No    [] Yes (see summary sheet for update)  Subjective functional status/changes:   [] No changes reported  \"Sore\"  Pt reports she is still dealing with lymphedema.     OBJECTIVE    Modality rationale: decrease pain and increase tissue extensibility to improve the patients ability to rest comfortably following PT session    Min Type Additional Details    [] Estim:  []Unatt       []IFC  []Premod                        []Other:  []w/ice   []w/heat  Position:  Location:    [] Estim: []Att    []TENS instruct  []NMES                    []Other:  []w/US   []w/ice   []w/heat  Position:  Location:    []  Traction: [] Cervical       []Lumbar                       [] Prone          []Supine []Intermittent   []Continuous Lbs:  [] before manual  [] after manual    []  Ultrasound: []Continuous   [] Pulsed                           []1MHz   []3MHz W/cm2:  Location:    []  Iontophoresis with dexamethasone         Location: [] Take home patch   [] In clinic   PD []  Ice     []  heat  []  Ice massage  []  Laser   []  Anodyne Position:  Location:    []  Laser with stim  []  Other:  Position:  Location:    []  Vasopneumatic Device    []  Right     []  Left  Pre-treatment girth:  Post-treatment girth:  Measured at (location):  Pressure:       [] lo [] med [] hi   Temperature: [] lo [] med [] hi   [] Skin assessment post-treatment:  []intact []redness- no adverse reaction    []redness - adverse reaction:       20 min Therapeutic Activity: discharge instructions, pool post rehab information, importance of compliance with HEP to avoid functional regression. Rationale:   to improve the patients ability to maintain functional progress made in skilled OP PT     10 min Neuromuscular Re-education:  [x]  See flow sheet :   Rationale: increase ROM, increase strength, improve coordination, improve balance, and increase proprioception  to improve the patients ability to perform ADLs, lifting, carrying with improved scapular and LE stability, abdominal strength/bracing.          With   [] TE   [] TA   [] neuro   [] other: Patient Education: [x] Review HEP    [] Progressed/Changed HEP based on:   [] positioning   [] body mechanics   [] transfers   [] heat/ice application    [] other:      Other Objective/Functional Measures:   Pain at worst in the last week: 7/10; on average: 7/10  Subjective % improvement since start of care: 80% - before the holidays,\" I was doing good before the holidays\"; 70% now  Functional improvements: being able to walk a little further, posture has improved, improved stamina, strength had improved    Functional limitations: posture has taken a step back due to not wearing AFOs, not wearing AFO's \"is going backwards\"  FOTO: 44    Strength:   Right (/5) Left (/5)   Hip     Flexion 3+ 3+             Abduction 3+ 3+             Extension 3- 2+             ER 3+ 3+             IR 3+ 3+      HEP compliance: \"not lately\"     Pain Level (0-10 scale) post treatment: 6 'stiffness\"    Summary of Care:  Goal: Pt to be able to stand erect consistently during therapy sessions without pain to reduce strain to low back with standing/amb activities. Status at last note/certification: able to stand upright x 20-25 minutes, improving further with issuance of and starting wearing of AFOs (12/8/22)  Current: pt laying down throughout PT session and taking seated breaks as needed (1/17/23) not met   Goal: Pt to increase B hip/knee strength to 4/5 grossly to increase ease of transfers and performance of ADLs. Status at last note/certification: hip flexion 3+/5 B, hip ABD 3+/5 B (12/8/22)  Current: not met (1/17/23) see strength measurements above   Goal: Pt to increase standing/amb tolerance to 15 minutes to increase ease of meal preparation, performance of light household chores. Status at last note/certification: 10 minute standing/amb tolerance (12/8/2022)  Current: walking/standing tolerance: pt reports she is able to do ~1 mile at a speed of 1.5 (1/17/23)   Goal: Pt to report < 4/10 pain at worst to increase ease with ADLs. Status at last note/certification: 7-2/92 pain at worst in the past week - moreso tightness than pain (12/8/2022)  Current: 7-8 pain at worst when performing laundry. (12/14/2022) Pain at worst in the last week: 7/10; on average: 7/10 (1/17/23) not met   Goal: Pt to report final FOTO score of 39 pts to show improved function and quality of life. Status at last note/certification: met - FOTO 48 pts (12/8/22)  Current: 44 (1/17/23) still met     ASSESSMENT/Changes in Function:   Pt is being seen today for retro re-certification and discharge.  Pt attended therapy consistently for 20 visits including initial evaluation for the treatment of Cervicalgia [M54.2]  Other low back pain [M54.59]. At this point, the patient reports 70% improvement since [facility] with specific improvements in the following areas: \"being able to walk a little further, posture has improved, improved stamina, strength had improved\" Pt reports slight regression since the holidays and she reports non compliance with HEP lately. The patient has also demonstrated overall improvement in lumbar spine FOTO score from 25 pts at intake to 44 pts at time of discharge, demonstrating improved function in the home and community. Pt reports she is not currently wearing AFO's due to heel bruising, per patient, she has an appt with \"[facility]\" tomorrow. Discussed with patient pool post rehab program as patient informed therapist she will be joining. Pt is being d/c at this time due to plateau in functional progress with minimal improvements noted in strength and in ambulation/standing tolerance as well as pain levels. The patient is appropriate for discharge at this time with a comprehensive HEP and will follow up with our office about any questions that arise following discharge. Thank you for this referral.       PLAN  [x]Discontinue therapy: []Patient has reached or is progressing toward set goals      []Patient is non-compliant or has abdicated      [x]Due to lack of appreciable progress towards set goals/plateau in functional progress    Ross Mich, PT 1/17/2023  1:25 PM     NOTE TO PHYSICIAN:  Your patient's insurance requires this discharge note be signed and returned. PLEASE COMPLETE THE ORDERS BELOW AND RETURN TO:  [facility]    ___ I have read the above report and request that my patient be discharged from therapy.      Physician Signature:        Date:       Time:                                        Allyssa Padilla MD       To ensure we are able to process the patients encounter and avoid risk of your patient receiving a bill for our services, please sign and return this discharge summary by 02/15/23. (30days following DC date)     Future Appointments   Date Time Provider Frederic Sanders   2/2/2023  4:00 PM Narendra Fletcher MD [facility]

## 2023-01-17 NOTE — PROGRESS NOTES
[facility] - [facility] [place], [address]  [phone] fax    Continued Plan of Care/ Re-certification for Physical Therapy Services      Patient name: Genna Doyle Start of Care:  2022   Referral source: Rohith Maddox MD : 1975   Medical/Treatment Diagnosis: Cervicalgia [M54.2]  Other low back pain [M54.59]  Payor: Daljit Elsi / Plan: [email or web address] / Product Type: Managed Care Medicare /  Onset Date:22 (script)     Prior Hospitalization: see medical history Provider#: [id number]   Medications: Verified on Patient Summary List    Comorbidities:  Arthritis; Asthma; Back pain - scoliosis; hx CA - in remission; Depression; HTN; Schizoaffective disorder; PTSD; ADHD; Lymphedema LEs - finished treatment at [facility]  Prior Level of Function:Not working, lives in [facility] apt alone;     Visits from Start of Care: 20    Missed Visits: 3    The Plan of Care and following information is based on the patient's current status:  Goal: Pt to be able to stand erect consistently during therapy sessions without pain to reduce strain to low back with standing/amb activities. Status at last note/certification: able to stand upright x 20-25 minutes, improving further with issuance of and starting wearing of AFOs (22)  Current:   Goal: Pt to increase B hip/knee strength to 4/5 grossly to increase ease of transfers and performance of ADLs. Status at last note/certification: hip flexion 3+/5 B, hip ABD 3+/5 B (22)  Current:   Goal: Pt to increase standing/amb tolerance to 15 minutes to increase ease of meal preparation, performance of light household chores. Status at last note/certification: 10 minute standing/amb tolerance (2022)  Current:   Goal: Pt to report < 4/10 pain at worst to increase ease with ADLs.   Status at last note/certification: 0-4/14 pain at worst in the past week - moreso tightness than pain (12/8/2022)  Current: 7-8 pain at worst when performing laundry. (12/14/2022)  Goal: Pt to report final FOTO score of 39 pts to show improved function and quality of life. Status at last note/certification: met - FOTO 48 pts (12/8/22)  Current:     Key functional changes:   Pain at worst in the last week: 7/10; on average: 7/10  Subjective % improvement since start of care: 80% - before the holidays,\" I was doing good before the holidays\"; 70% now  Functional improvements: being able to walk a little further, posture has improved, improved stamina, strength had improved    Functional limitations: posture has taken a step back due to not wearing AFOs, not wearing AFO's \"is going backwards\"  FOTO: 44          Strength:    Right (/5) Left (/5)   Hip     Flexion 3+ 3+             Abduction 3+ 3+             Extension 3- 2+             ER 3+ 3+             IR 3+ 3+      HEP compliance: \"not lately\"       Problems/ barriers to goal attainment: none     Problem List: pain affecting function, decrease ROM, decrease strength, impaired gait/ balance, decrease ADL/ functional abilitiies, decrease activity tolerance, and decrease flexibility/ joint mobility    Treatment Plan: Therapeutic exercise, Neuromuscular reeducation, Manual therapy, Therapeutic activity, Self care/home management, Electric stim unattended , Vasopneumatic device, Aquatic therapy, Gait training, Ultrasound, Mechanical traction, and Electric stim attended     Patient Goal (s) has been updated and includes: no change    Goals for this certification period to be accomplished in 2 treatments:  Goal: Pt to be able to stand erect consistently during therapy sessions without pain to reduce strain to low back with standing/amb activities.   Status at last note/certification: able to stand upright x 20-25 minutes, improving further with issuance of and starting wearing of AFOs (12/8/22)  Current:   Goal: Pt to increase B hip/knee strength to 4/5 grossly to increase ease of transfers and performance of ADLs. Status at last note/certification: hip flexion 3+/5 B, hip ABD 3+/5 B (12/8/22)  Current:   Goal: Pt to increase standing/amb tolerance to 15 minutes to increase ease of meal preparation, performance of light household chores. Status at last note/certification: 10 minute standing/amb tolerance (12/8/2022)  Current:   Goal: Pt to report < 4/10 pain at worst to increase ease with ADLs. Status at last note/certification: 9-8/09 pain at worst in the past week - moreso tightness than pain (12/8/2022)  Current: 7-8 pain at worst when performing laundry. (12/14/2022)  Goal: Pt to report final FOTO score of 39 pts to show improved function and quality of life. Status at last note/certification: met - FOTO 48 pts (12/8/22)  Current:     Frequency / Duration: Patient to be seen 1 times per week for 2 treatments:    Assessment / Recommendations:  Pt is being seen today for retro re-certification and discharge. Pt attended therapy consistently for 20 visits including initial evaluation for the treatment of Cervicalgia [M54.2]  Other low back pain [M54.59]. At this point, the patient reports 70% improvement since [facility] with specific improvements in the following areas: \"being able to walk a little further, posture has improved, improved stamina, strength had improved\" Pt reports slight regression since the holidays and she reports non compliance with HEP lately. The patient has also demonstrated overall improvement in lumbar spine FOTO score from 25 pts at intake to 44 pts at time of discharge, demonstrating improved function in the home and community. Pt reports she is not currently wearing AFO's due to heel bruising, per patient, she has an appt with \"[facility]\" tomorrow. Discussed with patient pool post rehab program as patient informed therapist she will be joining.  Pt is being d/c at this time due to plateau in functional progress with minimal improvements noted in strength and in ambulation/standing tolerance as well as pain levels. The patient is appropriate for discharge at this time with a comprehensive HEP and will follow up with our office about any questions that arise following discharge. Reporting period: 49/87/04 - 3/95/09   Certification Period: 1/7/23 - 2/5/23    Connor Sandifer, PT 1/17/2023  1:30 PM     ________________________________________________________________________  I certify that the above Therapy Services are being furnished while the patient is under my care. I agree with the treatment plan and certify that this therapy is necessary. [] I have read the above and request that my patient continue as recommended.   [] I have read the above report and request that my patient continue therapy with the following changes/special instructions: ______________________________________  [] I have read the above report and request that my patient be discharged from therapy    Physician's Signature:____________Date:_________TIME:________     Nader Fernandes MD  ** Signature, Date and Time must be completed for valid certification **    Please sign and return to [facility] - [address]  [phone] fax

## 2023-02-02 ENCOUNTER — TELEPHONE (OUTPATIENT)
Dept: ORTHOPEDIC SURGERY | Age: 48
End: 2023-02-02

## 2023-05-23 ENCOUNTER — HOSPITAL ENCOUNTER (OUTPATIENT)
Facility: HOSPITAL | Age: 48
Setting detail: RECURRING SERIES
Discharge: HOME | End: 2023-05-26

## 2023-06-20 ENCOUNTER — APPOINTMENT (OUTPATIENT)
Facility: HOSPITAL | Age: 48
End: 2023-06-20

## 2023-06-20 ENCOUNTER — HOSPITAL ENCOUNTER (EMERGENCY)
Facility: HOSPITAL | Age: 48
Discharge: HOME | End: 2023-06-20
Attending: EMERGENCY MEDICINE

## 2023-06-20 VITALS
DIASTOLIC BLOOD PRESSURE: 76 MMHG | RESPIRATION RATE: 18 BRPM | TEMPERATURE: 98.2 F | BODY MASS INDEX: 45.16 KG/M2 | HEART RATE: 82 BPM | HEIGHT: 60 IN | WEIGHT: 230 LBS | SYSTOLIC BLOOD PRESSURE: 130 MMHG | OXYGEN SATURATION: 100 %

## 2023-06-20 DIAGNOSIS — M54.50 ACUTE LEFT-SIDED LOW BACK PAIN, UNSPECIFIED WHETHER SCIATICA PRESENT: ICD-10-CM
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
## 2023-06-20 DIAGNOSIS — S20.212A CONTUSION OF RIB ON LEFT SIDE, INITIAL ENCOUNTER: ICD-10-CM

## 2023-06-20 PROCEDURE — 71101 X-RAY EXAM UNILAT RIBS/CHEST: CPT

## 2023-06-20 PROCEDURE — 99283 EMERGENCY DEPT VISIT LOW MDM: CPT

## 2023-06-20 PROCEDURE — 73502 X-RAY EXAM HIP UNI 2-3 VIEWS: CPT

## 2023-06-20 PROCEDURE — 72100 X-RAY EXAM L-S SPINE 2/3 VWS: CPT

## 2023-06-20 RX ORDER — OXYCODONE HYDROCHLORIDE AND ACETAMINOPHEN 5; 325 MG/1; MG/1
1 TABLET ORAL
Status: DISCONTINUED | OUTPATIENT
Start: 2023-06-20 | End: 2023-06-20 | Stop reason: HOSPADM

## 2023-06-20 RX ORDER — LIDOCAINE 50 MG/G
1 PATCH TOPICAL DAILY
Qty: 10 PATCH | Refills: 0 | Status: SHIPPED | OUTPATIENT
Start: 2023-06-20 | End: 2023-06-30

## 2023-06-20 RX ORDER — METHOCARBAMOL 750 MG/1
750 TABLET, FILM COATED ORAL 3 TIMES DAILY
Qty: 15 TABLET | Refills: 0 | Status: SHIPPED | OUTPATIENT
Start: 2023-06-20 | End: 2023-06-22 | Stop reason: SDUPTHER

## 2023-06-20 RX ORDER — ACETAMINOPHEN 500 MG
1000 TABLET ORAL 4 TIMES DAILY PRN
Qty: 30 TABLET | Refills: 1 | Status: SHIPPED | OUTPATIENT
Start: 2023-06-20

## 2023-06-20 ASSESSMENT — ENCOUNTER SYMPTOMS
SHORTNESS OF BREATH: 0
BACK PAIN: 1

## 2023-06-20 NOTE — ED TRIAGE NOTES
Mechanical fall from chair. Left side hit. No loc. Left hip pain, left shoulder pain, lower back pain. Not on blood thinner. Axox4.

## 2023-06-20 NOTE — ED PROVIDER NOTES
[facility] EMERGENCY DEPT  EMERGENCY DEPARTMENT ENCOUNTER      Pt Name: Madelyn Alonzo  MRN: [id number]  [place] 1975  Date of evaluation: 6/20/2023  Provider: MIGUE Matute    CHIEF COMPLAINT       Chief Complaint   Patient presents with    Fall         HISTORY OF PRESENT ILLNESS   (Location/Symptom, Timing/Onset, Context/Setting, Quality, Duration, Modifying Factors, Severity)  Note limiting factors. Madelyn Alonzo is a 46 y.o. female who presents to the emergency department complaint of left hip left low back left rib pain after falling today. Patient was trying out a new chair at the store sat down to squat and to make sure that it was comfortable even though it appeared like rotting wood and she fell. She hit the back of her left femur on the contour of the seat. Denies any head injury or anticoagulation. She has a history of a left nephrectomy and hysterectomy. Denies any saddle anesthesia bowel incontinence urinary retention fever rash IVDU. Has mild left pain radiating down the left side of her leg. HPI    Nursing Notes were reviewed. REVIEW OF SYSTEMS    (2-9 systems for level 4, 10 or more for level 5)     Review of Systems   Constitutional:  Negative for fatigue and fever. Respiratory:  Negative for shortness of breath. Cardiovascular:  Negative for chest pain. Musculoskeletal:  Positive for arthralgias and back pain. Skin:  Negative for rash and wound. Neurological:  Negative for weakness and numbness. Except as noted above the remainder of the review of systems was reviewed and negative.        PAST MEDICAL HISTORY     Past Medical History:   Diagnosis Date    Anxiety     Asthma     Bronchitis     Depression     pt states anxiety schizoaffective, ptsd,adhd,mixed receptive language disorder    Eye twitch     Hypertension     Kidney carcinoma, left ([facility] [address]     Nephrectomy    Lymphedema     Psychotic affective disorder ([facility] [address]     attention deficit disorder, and PTSD    Psychotic

## 2023-06-21 ENCOUNTER — TELEPHONE (OUTPATIENT)
Age: 48
End: 2023-06-21

## 2023-06-21 NOTE — TELEPHONE ENCOUNTER
Reviewed yesterday's ED notes, xrays, and meds given. Sounds like she has a contusion that is going to take time for the inflammation to go down. Advise ice 20min at a time every hour. Pt can not take NSAIDS. Agree w/Lidoderm, Robaxin, and Lidoderm patches.

## 2023-06-21 NOTE — TELEPHONE ENCOUNTER
Patient called for Lyubov Tracy. Patient said that she was seen yesterday at [facility]. That she had fallen. Patient said that they took an xray of her back, hip and ribs. Patient said that this morning, she woke up with her back Swollen and has Soreness. Patient said that she knows she has an appt with Lyubov Tracy on 7/12/23 for her Back and Neck, but is asking if their is anyway Lyubov Tracy could see her sometime today. That she can not wait until 7/12/23. Patient tel. [phone]. Note : patient last seen on 11/03/2022.

## 2023-06-21 NOTE — TELEPHONE ENCOUNTER
I called and spoke to the pt. The pt was identified using 2 pt identifiers. An appt opened up tomorrow at 0940 with Dr. Jeniffer Ferrell. This was offered to the pt. She accepted the appt and is aware of the office location.

## 2023-06-21 NOTE — TELEPHONE ENCOUNTER
Patient called back stating she does not know how much longer she can wait, her pain is unbearable. Pt was advised to go back to the ER if need be, but she stated they did nothing for her the last time she went. She would like a call as soon as possible to be notified when she can be seen.

## 2023-06-22 ENCOUNTER — OFFICE VISIT (OUTPATIENT)
Age: 48
End: 2023-06-22
Payer: MEDICARE

## 2023-06-22 VITALS
OXYGEN SATURATION: 97 % | HEIGHT: 60 IN | SYSTOLIC BLOOD PRESSURE: 136 MMHG | DIASTOLIC BLOOD PRESSURE: 86 MMHG | HEART RATE: 78 BPM | BODY MASS INDEX: 47.12 KG/M2 | WEIGHT: 240 LBS | TEMPERATURE: 96.8 F

## 2023-06-22 DIAGNOSIS — R07.81 RIB PAIN ON LEFT SIDE: ICD-10-CM

## 2023-06-22 DIAGNOSIS — M79.18 LEFT BUTTOCK PAIN: ICD-10-CM
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
## 2023-06-22 DIAGNOSIS — M54.50 LUMBAR PAIN ON PALPATION: ICD-10-CM

## 2023-06-22 PROCEDURE — 99214 OFFICE O/P EST MOD 30 MIN: CPT | Performed by: PHYSICAL MEDICINE & REHABILITATION

## 2023-06-22 RX ORDER — METHOCARBAMOL 500 MG/1
250-500 TABLET, FILM COATED ORAL 3 TIMES DAILY
Qty: 45 TABLET | Refills: 0 | Status: SHIPPED | OUTPATIENT
Start: 2023-06-22 | End: 2023-07-22

## 2023-06-22 RX ORDER — HYDROXYZINE HYDROCHLORIDE 25 MG/1
TABLET, FILM COATED ORAL
COMMUNITY
Start: 2023-03-23 | End: 2023-06-22

## 2023-06-22 RX ORDER — FLUOXETINE HYDROCHLORIDE 40 MG/1
40 CAPSULE ORAL DAILY
COMMUNITY
Start: 2023-05-12

## 2023-06-22 RX ORDER — ZIPRASIDONE HYDROCHLORIDE 40 MG/1
40 CAPSULE ORAL DAILY
COMMUNITY
Start: 2023-06-20

## 2023-06-22 RX ORDER — ATORVASTATIN CALCIUM 20 MG/1
TABLET, FILM COATED ORAL
COMMUNITY
Start: 2023-05-26

## 2023-06-22 RX ORDER — HYDROCHLOROTHIAZIDE 25 MG/1
25 TABLET ORAL DAILY
COMMUNITY
Start: 2023-05-30

## 2023-06-22 NOTE — PROGRESS NOTES
[facility]    [address], [address]  [place], [address] [place] [address] Street  Phone: [phone]  Fax: [phone]        Juarez   : 1975  PCP: [facility]    PROGRESS NOTE      ASSESSMENT AND PLAN    Hal Romero was seen today for back pain. Diagnoses and all orders for this visit:    Fall from chair, initial encounter  [facility] - [facility] - [facility]    Rib pain on left side  -     [facility] - [facility]  -     methocarbamol (ROBAXIN) 500 MG tablet; Take 0.5-1 tablets by mouth 3 times daily    Lumbar pain on palpation  -     [facility] - [facility] - [facility]  -     methocarbamol (ROBAXIN) 500 MG tablet; Take 0.5-1 tablets by mouth 3 times daily    Left buttock pain        Dwight Avelar is a 46 y.o. female with a history of renal cancer presenting with left-sided rib, flank, low back, and buttock pain post fall from chair several days ago. She had a recent evaluation at the ED, x-rays were negative for acute findings, no soft tissue abnormalities noted. She is having benefit with Lidoderm patches. .   Advised to use ice on affected areas for 15 minutes at a time. Referral to aqua therapy. Advised to continue HEP as tolerated. Continue Robaxin 500 mg PRN, decreased from 750 given through ED. Cautioned about somnolence. .   Continue Lidoderm patches PRN. May use two Lidoderm patches at a time. Advised to use them 12 hours on, 12 hours off. Advised to use Tylenol PRN. Patient may take up to 2g (2,000 mg) every 24 hours for routine use, or 3g (3,000 mg) for short-term use. Follow-up and Dispositions    Return if symptoms worsen or fail to improve. HISTORY OF PRESENT ILLNESS      Dwight Avelar is a 46 y.o. female presents for back pain due to fall. Pt called in for an unscheduled appointment due to a fall from chair.  She was seen

## 2023-06-22 NOTE — PROGRESS NOTES
Paloma Granger presents today for   Chief Complaint   Patient presents with    Back Pain       Is someone accompanying this pt? no    Is the patient using any DME equipment during OV? no      Coordination of Care:  1. Have you been to the ER, urgent care clinic since your last visit? Yes, pt went to the ER recently after she fell   Hospitalized since your last visit? no    2. Have you seen or consulted any other health care providers outside of the [address] [place] [address] since your last visit? Yes, pcp  Include any pap smears or colon screening.  no

## 2023-06-28 ENCOUNTER — TELEPHONE (OUTPATIENT)
Age: 48
End: 2023-06-28

## 2023-06-28 DIAGNOSIS — R07.81 RIB PAIN ON LEFT SIDE: Primary | ICD-10-CM

## 2023-06-28 DIAGNOSIS — M54.50 LUMBAR PAIN ON PALPATION: ICD-10-CM

## 2023-06-28 RX ORDER — LIDOCAINE 50 MG/G
1 PATCH TOPICAL DAILY
Qty: 30 PATCH | Refills: 1 | Status: SHIPPED | OUTPATIENT
Start: 2023-06-28 | End: 2023-08-27

## 2023-06-28 NOTE — TELEPHONE ENCOUNTER
Patient is asking for a refill of Lidocaine patches. Patient uses the [address] [place] [address] on [place], and can be reached at [phone].

## 2023-07-13 ENCOUNTER — HOSPITAL ENCOUNTER (OUTPATIENT)
Facility: HOSPITAL | Age: 48
Setting detail: RECURRING SERIES
Discharge: HOME | End: 2023-07-16
Payer: MEDICARE

## 2023-07-13 PROCEDURE — 97161 PT EVAL LOW COMPLEX 20 MIN: CPT

## 2023-07-18 ENCOUNTER — HOSPITAL ENCOUNTER (OUTPATIENT)
Facility: HOSPITAL | Age: 48
Setting detail: RECURRING SERIES
Discharge: HOME | End: 2023-07-21
Payer: MEDICARE

## 2023-07-18 PROCEDURE — 97113 AQUATIC THERAPY/EXERCISES: CPT

## 2023-07-20 ENCOUNTER — HOSPITAL ENCOUNTER (OUTPATIENT)
Facility: HOSPITAL | Age: 48
Setting detail: RECURRING SERIES
Discharge: HOME | End: 2023-07-23
Payer: MEDICARE

## 2023-07-20 PROCEDURE — 97113 AQUATIC THERAPY/EXERCISES: CPT

## 2023-07-20 PROCEDURE — 97530 THERAPEUTIC ACTIVITIES: CPT

## 2023-07-25 ENCOUNTER — HOSPITAL ENCOUNTER (OUTPATIENT)
Facility: HOSPITAL | Age: 48
Setting detail: RECURRING SERIES
Discharge: HOME | End: 2023-07-28
Payer: MEDICARE

## 2023-07-25 PROCEDURE — 97113 AQUATIC THERAPY/EXERCISES: CPT

## 2023-07-27 ENCOUNTER — APPOINTMENT (OUTPATIENT)
Facility: HOSPITAL | Age: 48
End: 2023-07-27
Payer: MEDICARE

## 2023-08-01 ENCOUNTER — HOSPITAL ENCOUNTER (OUTPATIENT)
Facility: HOSPITAL | Age: 48
Setting detail: RECURRING SERIES
Discharge: HOME | End: 2023-08-04
Payer: MEDICARE

## 2023-08-01 PROCEDURE — 97113 AQUATIC THERAPY/EXERCISES: CPT

## 2023-08-03 ENCOUNTER — HOSPITAL ENCOUNTER (OUTPATIENT)
Facility: HOSPITAL | Age: 48
Setting detail: RECURRING SERIES
Discharge: HOME | End: 2023-08-06
Payer: MEDICARE

## 2023-08-03 PROCEDURE — 97113 AQUATIC THERAPY/EXERCISES: CPT

## 2023-08-08 ENCOUNTER — HOSPITAL ENCOUNTER (OUTPATIENT)
Facility: HOSPITAL | Age: 48
Setting detail: RECURRING SERIES
Discharge: HOME | End: 2023-08-11
Payer: MEDICARE

## 2023-08-08 PROCEDURE — 97113 AQUATIC THERAPY/EXERCISES: CPT

## 2023-08-10 ENCOUNTER — HOSPITAL ENCOUNTER (OUTPATIENT)
Facility: HOSPITAL | Age: 48
Setting detail: RECURRING SERIES
Discharge: HOME | End: 2023-08-13
Payer: MEDICARE

## 2023-08-10 PROCEDURE — 97113 AQUATIC THERAPY/EXERCISES: CPT

## 2023-08-10 PROCEDURE — 97530 THERAPEUTIC ACTIVITIES: CPT

## 2023-08-10 NOTE — PROGRESS NOTES
[address] [facility] PHYSICAL THERAPY AT [facility]   [address] [facility] [place], [address]   Phone: [phone] Fax: [phone]  PROGRESS NOTE  Patient Name: Tammy Em : 1975   Treatment/Medical Diagnosis: Fall from chair, initial encounter [W07. XXXA]  Rib pain on left side [R07.81]  Lumbar pain on palpation [M54.50]   Referral Source: Katia Gao MD     Payor: Payor: Heide Quintana / Plan: Heide Quintana / Product Type: *No Product type* /            Date of Initial Visit: 2023 Attended Visits: 8 Missed Visits: 0       CURRENT GOAL STATUS  Short Term Goals: To be accomplished in 2 weeks:  1- Goal: Pt to be compliant with initial HEP to improve lumbar, hip mobility for ease of ADL performance. Status at last note/certification: Established and reviewed with Pt  Current: MET  2- Goal: Pt to initiate aquatics program without increased pain to aid in achievement of all LTGs. Status at last note/certification: N/A  Current: MET  Long Term Goals: To be accomplished in 24 treatments:  1- Goal: Pt to increase lumbar AROM to 75% of full all planes without increased pain for ease of bending and reaching tasks. Status at last note/certification: FF 57%, Ext 25%, S/B 50% B, Rot 50% B - all with pain  Current: progressing: %, Ext 0%, S/B 75% B, Rot 50% B - all with pain (8/10/2023)  2- Goal: Pt to increase B hip strength to 4/5 grossly for ease of squatting, negotiating stairs into and out of home. Status at last note/certification:     Right (/5) Left (/5)   Hip     Flexion 3+ 3+             Abduction 3+ 3+             Extension 3+ 3+             ER 4- 4-             IR 4- 4-   Current: progressing: (8/10/2023)    Right (/5) Left (/5)   Hip     Flexion 4+ 4+             Abduction (Standing) 3+ 3+             Extension (Standing) 3+ 3+             ER 4 4             IR 4 4+     3- Goal: Pt to report < 5/10 pain at worst to increase ease with ADLs.   Status

## 2023-08-15 ENCOUNTER — HOSPITAL ENCOUNTER (OUTPATIENT)
Facility: HOSPITAL | Age: 48
Setting detail: RECURRING SERIES
Discharge: HOME | End: 2023-08-18
Payer: MEDICARE

## 2023-08-15 PROCEDURE — 97110 THERAPEUTIC EXERCISES: CPT

## 2023-08-15 PROCEDURE — 97112 NEUROMUSCULAR REEDUCATION: CPT

## 2023-08-15 NOTE — PROGRESS NOTES
PHYSICAL / OCCUPATIONAL THERAPY - DAILY TREATMENT NOTE (updated )    Patient Name: Lindell Scheuermann    Date: 8/15/2023    : 1975  Insurance: Payor: Jena Chapman / Plan: Jena Chapman / Product Type: *No Product type* /      Patient  verified Yes     Visit #   Current / Total 1 16   Time   In / Out 9:05 9:41   Pain   In / Out 0/10 0/10   Subjective Functional Status/Changes: I don't have any pain just a lot of stiffness. TREATMENT AREA =  Fall from chair, initial encounter [W07. XXXA]  Rib pain on left side [R07.81]  Lumbar pain on palpation [M54.50]    OBJECTIVE         Therapeutic Procedures: Tx Min Billable or 1:1 Min (if diff from Tx Min) Procedure, Rationale, Specifics   20 20 51388 Therapeutic Exercise (timed):  increase ROM, strength, coordination, balance, and proprioception to improve patient's ability to progress to PLOF and address remaining functional goals. (see flow sheet as applicable)     Details if applicable:       16  92843 Neuromuscular Re-Education (timed):  improve balance, coordination, kinesthetic sense, posture, core stability and proprioception to improve patient's ability to develop conscious control of individual muscles and awareness of position of extremities in order to progress to PLOF and address remaining functional goals.  (see flow sheet as applicable)     Details if applicable:                    39 36 [facility] Totals Reminder: bill using total billable min of TIMED therapeutic procedures (example: do not include dry needle or estim unattended, both untimed codes, in totals to left)  8-22 min = 1 unit; 23-37 min = 2 units; 38-52 min = 3 units; 53-67 min = 4 units; 68-82 min = 5 units   Total Total     [x]  Patient Education billed concurrently with other procedures   [x] Review HEP    [] Progressed/Changed HEP, detail:    [] Other detail:       Objective Information/Functional Measures/Assessment    Pt reports to first skilled therapy land therapy session

## 2023-08-17 ENCOUNTER — HOSPITAL ENCOUNTER (OUTPATIENT)
Facility: HOSPITAL | Age: 48
Setting detail: RECURRING SERIES
Discharge: HOME | End: 2023-08-20
Payer: MEDICARE

## 2023-08-17 PROCEDURE — 97110 THERAPEUTIC EXERCISES: CPT

## 2023-08-17 PROCEDURE — 97112 NEUROMUSCULAR REEDUCATION: CPT

## 2023-08-17 NOTE — PROGRESS NOTES
PHYSICAL / OCCUPATIONAL THERAPY - DAILY TREATMENT NOTE (updated )    Patient Name: Jesús Sharpe    Date: 2023    : 1975  Insurance: Payor: Eligio Alan / Plan: Luciennoman Waqas / Product Type: *No Product type* /      Patient  verified Yes     Visit #   Current / Total 2 16   Time   In / Out 2:50 3:25   Pain   In / Out 0 0   Subjective Functional Status/Changes: Pt reports just stiffness upon arrival.     TREATMENT AREA =  Fall from chair, initial encounter [W07. XXXA]  Rib pain on left side [R07.81]  Lumbar pain on palpation [M54.50]    OBJECTIVE    Therapeutic Procedures: Tx Min Billable or 1:1 Min (if diff from Tx Min) Procedure, Rationale, Specifics   16 16 36564 Therapeutic Exercise (timed):  increase ROM, strength, coordination, balance, and proprioception to improve patient's ability to progress to PLOF and address remaining functional goals. (see flow sheet as applicable)     Details if applicable:       13 13 81108 Neuromuscular Re-Education (timed):  improve balance, coordination, kinesthetic sense, posture, core stability and proprioception to improve patient's ability to develop conscious control of individual muscles and awareness of position of extremities in order to progress to PLOF and address remaining functional goals. (see flow sheet as applicable)     Details if applicable:     6 6 98411 Therapeutic Activity (timed):  use of dynamic activities replicating functional movements to increase ROM, strength, coordination, balance, and proprioception in order to improve patient's ability to progress to PLOF and address remaining functional goals.   (see flow sheet as applicable)     Details if applicable:     35 28 [address] [facility] Reminder: bill using total billable min of TIMED therapeutic procedures (example: do not include dry needle or estim unattended, both untimed codes, in totals to left)  8-22 min = 1 unit; 23-37 min = 2 units; 38-52 min = 3 units; 53-67 min = 4 units;

## 2023-08-23 ENCOUNTER — HOSPITAL ENCOUNTER (OUTPATIENT)
Facility: HOSPITAL | Age: 48
Setting detail: RECURRING SERIES
Discharge: HOME | End: 2023-08-26
Payer: MEDICARE

## 2023-08-23 PROCEDURE — 97112 NEUROMUSCULAR REEDUCATION: CPT

## 2023-08-23 PROCEDURE — 97110 THERAPEUTIC EXERCISES: CPT

## 2023-08-23 NOTE — PROGRESS NOTES
function and quality of life.   Status at last note/certification: FOTO 54 pts (8/10/23)  Current:       Next PN/ RC due 9/8/2023  Auth due ALTAGRACIA    PLAN  YES Continue plan of care  []  Upgrade activities as tolerated  []  Discharge due to :  []  Other:    Wai Powell [facility]    8/23/2023    10:41 AM    Future Appointments   Date Time Provider 4600  [address]   8/25/2023  2:00 PM Juana Pelaez [place] [facility]   8/28/2023 10:20 AM Wai Powell PTA [facility]   8/30/2023 10:20 AM Wai Powell, PTA [facility]   9/5/2023  2:30 PM [facility] RM 4 3D Denis Cash

## 2023-08-25 ENCOUNTER — TELEPHONE (OUTPATIENT)
Age: 48
End: 2023-08-25

## 2023-08-25 ENCOUNTER — HOSPITAL ENCOUNTER (OUTPATIENT)
Facility: HOSPITAL | Age: 48
Setting detail: RECURRING SERIES
Discharge: HOME | End: 2023-08-28
Payer: MEDICARE

## 2023-08-25 PROCEDURE — 97110 THERAPEUTIC EXERCISES: CPT

## 2023-08-25 PROCEDURE — 97530 THERAPEUTIC ACTIVITIES: CPT

## 2023-08-25 PROCEDURE — 97112 NEUROMUSCULAR REEDUCATION: CPT

## 2023-08-25 NOTE — TELEPHONE ENCOUNTER
I would think this would need to come from her PCP. She has history of kidney cancer. Is there a new cancer that Dr Judith Madirgal found? I did not see that she did in the last note.

## 2023-08-25 NOTE — PROGRESS NOTES
PHYSICAL / OCCUPATIONAL THERAPY - DAILY TREATMENT NOTE (updated )    Patient Name: Linwood Wilkinson    Date: 2023    : 1975  Insurance: Payor: González Tamayo / Plan: González Tamayo / Product Type: *No Product type* /      Patient  verified Yes     Visit #   Current / Total 4 16   Time   In / Out 2:02 2:46   Pain   In / Out 0 0   Subjective Functional Status/Changes: Pt reports that she is feeling really good today, with no pain or stiffness. TREATMENT AREA =  Fall from chair, initial encounter [W07. XXXA]  Rib pain on left side [R07.81]  Lumbar pain on palpation [M54.50]    OBJECTIVE    Therapeutic Procedures: Tx Min Billable or 1:1 Min (if diff from Tx Min) Procedure, Rationale, Specifics    76789 Therapeutic Exercise (timed):  increase ROM, strength, coordination, balance, and proprioception to improve patient's ability to progress to PLOF and address remaining functional goals. (see flow sheet as applicable)     Details if applicable:        Neuromuscular Re-Education (timed):  improve balance, coordination, kinesthetic sense, posture, core stability and proprioception to improve patient's ability to develop conscious control of individual muscles and awareness of position of extremities in order to progress to PLOF and address remaining functional goals. (see flow sheet as applicable)     Details if applicable:      60061 Therapeutic Activity (timed):  use of dynamic activities replicating functional movements to increase ROM, strength, coordination, balance, and proprioception in order to improve patient's ability to progress to PLOF and address remaining functional goals.   (see flow sheet as applicable)     Details if applicable:     44 40 [facility] Totals Reminder: bill using total billable min of TIMED therapeutic procedures (example: do not include dry needle or estim unattended, both untimed codes, in totals to left)  8-22 min = 1 unit; 23-37 min = 2 units; 38-52

## 2023-08-25 NOTE — TELEPHONE ENCOUNTER
Patient called to request a referral to Oncology. Patient stated that to be put on the wait list or to be scheduled, a referral is needed. Patient has one more week of physical therapy. [address].   Dara Rousseau   Phone: [phone]    Patient can be reached at: [phone]

## 2023-08-28 ENCOUNTER — HOSPITAL ENCOUNTER (OUTPATIENT)
Facility: HOSPITAL | Age: 48
Setting detail: RECURRING SERIES
Discharge: HOME | End: 2023-08-31
Payer: MEDICARE

## 2023-08-28 PROCEDURE — 97110 THERAPEUTIC EXERCISES: CPT

## 2023-08-28 PROCEDURE — 97112 NEUROMUSCULAR REEDUCATION: CPT

## 2023-08-28 NOTE — TELEPHONE ENCOUNTER
Lymphedema is managed through vascular or wound care. Referral needs to come from PCP. No discussion of lymphedema at last visit 6/2023.

## 2023-08-28 NOTE — TELEPHONE ENCOUNTER
I called and spoke to the pt. The pt was identified using 2 pt identifiers. She was notified of the provider's response to her request for the lymphedema referral. The pt verbalized understanding and has no questions or concerns at this time.

## 2023-08-28 NOTE — TELEPHONE ENCOUNTER
I called and spoke to the pt. The pt was identified using 2 pt identifiers. I informed the pt that she will need to have this referral from her pcp. The pt states that this is for lymphedema and not for cancer. She is requesting the referral for this. Please advise.

## 2023-08-28 NOTE — PROGRESS NOTES
PHYSICAL / OCCUPATIONAL THERAPY - DAILY TREATMENT NOTE (updated )    Patient Name: Imelda Zheng    Date: 2023    : 1975  Insurance: Payor: Leonard Lay / Plan: Leonard Lay / Product Type: *No Product type* /      Patient  verified Yes     Visit #   Current / Total 5 16   Time   In / Out 10:22 11:16   Pain   In / Out 0/10 0/10   Subjective Functional Status/Changes: I'm feeling pretty good today. I'm not stiff either. TREATMENT AREA =  Fall from chair, initial encounter [W07. XXXA]  Rib pain on left side [R07.81]  Lumbar pain on palpation [M54.50]    OBJECTIVE    Modalities Rationale:     decrease pain and increase tissue extensibility to improve patient's ability to progress to PLOF and address remaining functional goals. min [] Estim Unattended, type/location:                                      []  w/ice    []  w/heat    min [] Estim Attended, type/location:                                     []  w/US     []  w/ice    []  w/heat    []  TENS insruct      min []  Mechanical Traction: type/lbs                   []  pro   []  sup   []  int   []  cont    []  before manual    []  after manual    min []  Ultrasound, settings/location:     10+ 2 minute set up min  unbill []  Ice     [x]  Heat    location/position: Supine: hot pack lower back. min []  Paraffin,  details:     min []  Vasopneumatic Device, press/temp:     min []  Starleen Serena / Verlon Brightly: If using vaso (only need to measure limb vaso being performed on)      pre-treatment girth :       post-treatment girth :       measured at (landmark location) :      min []  Other:    Skin assessment post-treatment:  Intact      Therapeutic Procedures:     Tx Min Billable or 1:1 Min (if diff from Tx Min) Procedure, Rationale, Specifics   25 23 [id number] Neuromuscular Re-Education (timed):  improve balance, coordination, kinesthetic sense, posture, core stability and proprioception to improve patient's ability to develop conscious

## 2023-08-30 ENCOUNTER — HOSPITAL ENCOUNTER (OUTPATIENT)
Facility: HOSPITAL | Age: 48
Setting detail: RECURRING SERIES
Discharge: HOME | End: 2023-09-02
Payer: MEDICARE

## 2023-08-30 PROCEDURE — 97112 NEUROMUSCULAR REEDUCATION: CPT

## 2023-08-30 PROCEDURE — 97110 THERAPEUTIC EXERCISES: CPT

## 2023-08-30 NOTE — PROGRESS NOTES
PHYSICAL / OCCUPATIONAL THERAPY - DAILY TREATMENT NOTE (updated )    Patient Name: Carey Quintana    Date: 2023    : 1975  Insurance: Payor: Kellie Crystal / Plan: Kellie Crystal / Product Type: *No Product type* /      Patient  verified Yes     Visit #   Current / Total 6 16   Time   In / Out 10:19 11:10   Pain   In / Out 0/10 0/10   Subjective Functional Status/Changes: I'm very sore and stiff after our last session, but I don't have any real pain. TREATMENT AREA =  Fall from chair, initial encounter [W07. XXXA]  Rib pain on left side [R07.81]  Lumbar pain on palpation [M54.50]    OBJECTIVE    Modalities Rationale:     decrease pain and increase tissue extensibility to improve patient's ability to progress to PLOF and address remaining functional goals. min [] Estim Unattended, type/location:                                      []  w/ice    []  w/heat    min [] Estim Attended, type/location:                                     []  w/US     []  w/ice    []  w/heat    []  TENS insruct      min []  Mechanical Traction: type/lbs                   []  pro   []  sup   []  int   []  cont    []  before manual    []  after manual    min []  Ultrasound, settings/location:     10 + 1 minute set up.  min  unbill []  Ice     []  Heat    location/position: Seated: MHP to the lower back. min []  Paraffin,  details:     min []  Vasopneumatic Device, press/temp:     min []  Carvin Loots / Gilbert Been: If using vaso (only need to measure limb vaso being performed on)      pre-treatment girth :       post-treatment girth :       measured at (landmark location) :      min []  Other:    Skin assessment post-treatment:  Intact      Therapeutic Procedures:     Tx Min Billable or 1:1 Min (if diff from Tx Min) Procedure, Rationale, Specifics   26 50 22393 Therapeutic Exercise (timed):  increase ROM, strength, coordination, balance, and proprioception to improve patient's ability to progress to PLOF and

## 2023-09-01 ENCOUNTER — TRANSCRIBE ORDERS (OUTPATIENT)
Facility: HOSPITAL | Age: 48
End: 2023-09-01

## 2023-09-01 DIAGNOSIS — Z12.31 VISIT FOR SCREENING MAMMOGRAM: Primary | ICD-10-CM

## 2023-09-11 ENCOUNTER — HOSPITAL ENCOUNTER (OUTPATIENT)
Facility: HOSPITAL | Age: 48
Setting detail: RECURRING SERIES
Discharge: HOME | End: 2023-09-14
Payer: MEDICARE

## 2023-09-11 PROCEDURE — 97112 NEUROMUSCULAR REEDUCATION: CPT

## 2023-09-11 PROCEDURE — 97110 THERAPEUTIC EXERCISES: CPT

## 2023-09-11 NOTE — PROGRESS NOTES
[address] [facility] PHYSICAL THERAPY AT [facility]   [address] [facility] [place], [address]   Phone: [phone] Fax: [phone]  PROGRESS NOTE  Patient Name: Oliverio Dutta : 1975   Treatment/Medical Diagnosis: Fall from chair, initial encounter [W07. XXXA]  Rib pain on left side [R07.81]  Lumbar pain on palpation [M54.50]   Referral Source: Ilsa Joyce MD     Payor: Payor: Debarah Ashland / Plan: Debarah Ashland / Product Type: *No Product type* /            Date of Initial Visit: 23 Attended Visits: 15 Missed Visits: 0       CURRENT GOAL STATUS  1- Goal: Pt to increase lumbar AROM to 75% of full all planes without increased pain for ease of bending and reaching tasks. Status at last note/certification: %, Ext 0%, S/B 75% B, Rot 50% B - all with pain (8/10/23)  Current:               Trunk AROM (% of full): flexion 100, extension 25, right side bend 75, left side bend 75, right rotation 50, left rotation 50. (23) progressing in extension      2- Goal: Pt to increase B hip strength to 4/5 grossly for ease of squatting, negotiating stairs into and out of home. Status at last note/certification: progressing - see below (8/10/23)    Right (/5) Left (/5)   Hip     Flexion 4+ 4+             Abduction (Standing) 3+ 3+             Extension (Standing) 3+ 3+             ER 4 4             IR 4 4+      Current: progressing in abduction(23)  Strength: Right (/5) Left (/5)   Hip     Flexion 4+ 4+             Abduction (side-lying) 4- 4             Extension (prone) 2+ 2 to 2+             ER 3+ to 4- 3+ to 4-             IR 4+ 4+      3- Goal: Pt to report < 5/10 pain at worst to increase ease with ADLs.   Status at last note/certification:  6-9/57 pain at worst, usually upon waking or late at night (8/10/23)  Current: 1-2/10 pain at worst. Notes tightness. (2023) 3-4/10 at the worst in the last week (23) met   4- Goal: Pt to report FOTO score of
week (9/11/23) met   4- Goal: Pt to report FOTO score of 59 pts to show improved function and quality of life.   Status at last note/certification: FOTO 54 pts (8/10/23)  Current: 40 (9/11/23 )regression though pt reports no functional regression at this time       Next PN/ RC due 10/10/23  Auth due ALTAGRACIA    PLAN  Yes  Continue plan of care  []  Upgrade activities as tolerated  []  Discharge due to :  []  Other:    Karlie Brannon, PT    9/11/2023  1:41 PM     Future Appointments   Date Time Provider [address]   9/13/2023  9:40 AM Amada Bowie [facility]   9/18/2023 11:40 AM Amada Bowie [facility]   9/20/2023  9:00 AM Amada Bowie [facility]   9/22/2023  1:00 PM [facility] RM 3 3D [facility] [place]   [facility]25[facility]2023 11[facility]

## 2023-09-13 ENCOUNTER — HOSPITAL ENCOUNTER (OUTPATIENT)
Facility: HOSPITAL | Age: 48
Setting detail: RECURRING SERIES
Discharge: HOME | End: 2023-09-16
Payer: MEDICARE

## 2023-09-13 PROCEDURE — 97112 NEUROMUSCULAR REEDUCATION: CPT

## 2023-09-13 PROCEDURE — 97110 THERAPEUTIC EXERCISES: CPT

## 2023-09-13 NOTE — PROGRESS NOTES
due [address] Laureano    9/13/2023    9:51 AM    Future Appointments   Date Time Provider [address]   9/18/2023 11:40 AM Angeles Watters PTA [facility]   9/20/2023  9:00 AM Angeles Watters PTA [facility]   9/22/2023  1:00 PM HBV ORLY RM 3 3D [facility]   9/25/2023 11:00 AM NATALIO Barrientos

## 2023-09-18 ENCOUNTER — HOSPITAL ENCOUNTER (OUTPATIENT)
Facility: HOSPITAL | Age: 48
Setting detail: RECURRING SERIES
Discharge: HOME | End: 2023-09-21
Payer: MEDICARE

## 2023-09-18 PROCEDURE — 97112 NEUROMUSCULAR REEDUCATION: CPT

## 2023-09-18 PROCEDURE — 97110 THERAPEUTIC EXERCISES: CPT

## 2023-09-18 NOTE — PROGRESS NOTES
PHYSICAL / OCCUPATIONAL THERAPY - DAILY TREATMENT NOTE (updated )    Patient Name: Nunu Armstrong    Date: 2023    : 1975  Insurance: Payor: Chase Martin / Plan: Chase Martin / Product Type: *No Product type* /      Patient  verified Yes     Visit #   Current / Total 3 10   Time   In / Out 11:50 12:20   Pain   In / Out 0/10 0/10   Subjective Functional Status/Changes: I'm sorry I'm late I just feel a little stiff. TREATMENT AREA =  Fall from chair, initial encounter [W07. XXXA]  Rib pain on left side [R07.81]  Lumbar pain on palpation [M54.50]    OBJECTIVE         Therapeutic Procedures: Tx Min Billable or 1:1 Min (if diff from Tx Min) Procedure, Rationale, Specifics   18  72965 Therapeutic Exercise (timed):  increase ROM, strength, coordination, balance, and proprioception to improve patient's ability to progress to PLOF and address remaining functional goals. (see flow sheet as applicable)     Details if applicable:        06330 Neuromuscular Re-Education (timed):  improve balance, coordination, kinesthetic sense, posture, core stability and proprioception to improve patient's ability to develop conscious control of individual muscles and awareness of position of extremities in order to progress to PLOF and address remaining functional goals.  (see flow sheet as applicable)     Details if applicable:                    30 27 [facility] Totals Reminder: bill using total billable min of TIMED therapeutic procedures (example: do not include dry needle or estim unattended, both untimed codes, in totals to left)  8-22 min = 1 unit; 23-37 min = 2 units; 38-52 min = 3 units; 53-67 min = 4 units; 68-82 min = 5 units   Total Total     [x]  Patient Education billed concurrently with other procedures   [x] Review HEP    [] Progressed/Changed HEP, detail:    [] Other detail:       Objective Information/Functional Measures/Assessment    Pt reports to skilled therapy session with increased

## 2023-09-20 ENCOUNTER — APPOINTMENT (OUTPATIENT)
Facility: HOSPITAL | Age: 48
End: 2023-09-20
Payer: MEDICARE

## 2023-09-25 ENCOUNTER — HOSPITAL ENCOUNTER (OUTPATIENT)
Facility: HOSPITAL | Age: 48
Setting detail: RECURRING SERIES
Discharge: HOME | End: 2023-09-28
Payer: MEDICARE

## 2023-09-25 PROCEDURE — 97110 THERAPEUTIC EXERCISES: CPT

## 2023-09-25 PROCEDURE — 97112 NEUROMUSCULAR REEDUCATION: CPT

## 2023-09-27 ENCOUNTER — HOSPITAL ENCOUNTER (OUTPATIENT)
Facility: HOSPITAL | Age: 48
Setting detail: RECURRING SERIES
Discharge: HOME | End: 2023-09-30
Payer: MEDICARE

## 2023-09-27 PROCEDURE — 97112 NEUROMUSCULAR REEDUCATION: CPT

## 2023-09-27 PROCEDURE — 97110 THERAPEUTIC EXERCISES: CPT

## 2023-09-27 NOTE — PROGRESS NOTES
PHYSICAL / OCCUPATIONAL THERAPY - DAILY TREATMENT NOTE (updated )    Patient Name: Yandy Finer    Date: 2023    : 1975  Insurance: Payor: Aimee Bowels / Plan: Aimee Bowels / Product Type: *No Product type* /      Patient  verified Yes     Visit #   Current / Total 5 10   Time   In / Out 2:02 2:40   Pain   In / Out 0 0   Subjective Functional Status/Changes: Pt reports some stiffness from prolonged sitting. States she almost fell at a restaurant the other day but was able to catch herself. TREATMENT AREA =  Fall from chair, initial encounter [W07. XXXA]  Rib pain on left side [R07.81]  Lumbar pain on palpation [M54.50]    OBJECTIVE    Therapeutic Procedures: Tx Min Billable or 1:1 Min (if diff from Tx Min) Procedure, Rationale, Specifics   17 17 50729 Therapeutic Exercise (timed):  increase ROM, strength, coordination, balance, and proprioception to improve patient's ability to progress to PLOF and address remaining functional goals. (see flow sheet as applicable)     Details if applicable:        58207 Neuromuscular Re-Education (timed):  improve balance, coordination, kinesthetic sense, posture, core stability and proprioception to improve patient's ability to develop conscious control of individual muscles and awareness of position of extremities in order to progress to PLOF and address remaining functional goals.  (see flow sheet as applicable)     Details if applicable:     42 39 [facility] Totals Reminder: bill using total billable min of TIMED therapeutic procedures (example: do not include dry needle or estim unattended, both untimed codes, in totals to left)  8-22 min = 1 unit; 23-37 min = 2 units; 38-52 min = 3 units; 53-67 min = 4 units; 68-82 min = 5 units   Total Total     [x]  Patient Education billed concurrently with other procedures   [x] Review HEP    [] Progressed/Changed HEP, detail:    [] Other detail:       Objective Information/Functional

## 2023-10-02 ENCOUNTER — HOSPITAL ENCOUNTER (OUTPATIENT)
Facility: HOSPITAL | Age: 48
Setting detail: RECURRING SERIES
Discharge: HOME | End: 2023-10-05
Payer: MEDICARE

## 2023-10-02 PROCEDURE — 97110 THERAPEUTIC EXERCISES: CPT

## 2023-10-02 NOTE — PROGRESS NOTES
PHYSICAL / OCCUPATIONAL THERAPY - DAILY TREATMENT NOTE (updated )    Patient Name: Dioni Villalba    Date: 10/2/2023    : 1975  Insurance: Payor: Kane Bullock / Plan: Kane Bullock / Product Type: *No Product type* /      Patient  verified Yes     Visit #   Current / Total 6 10   Time   In / Out 12:24 1:03   Pain   In / Out 0/10 0/10   Subjective Functional Status/Changes: I'm just stiff. TREATMENT AREA =  Fall from chair, initial encounter [W07. XXXA]  Rib pain on left side [R07.81]  Lumbar pain on palpation [M54.50]    OBJECTIVE         Therapeutic Procedures: Tx Min Billable or 1:1 Min (if diff from Tx Min) Procedure, Rationale, Specifics   39 39 30242 Therapeutic Exercise (timed):  increase ROM, strength, coordination, balance, and proprioception to improve patient's ability to progress to PLOF and address remaining functional goals. (see flow sheet as applicable)     Details if applicable:                           44 39 [facility] Totals Reminder: bill using total billable min of TIMED therapeutic procedures (example: do not include dry needle or estim unattended, both untimed codes, in totals to left)  8-22 min = 1 unit; 23-37 min = 2 units; 38-52 min = 3 units; 53-67 min = 4 units; 68-82 min = 5 units   Total Total     [x]  Patient Education billed concurrently with other procedures   [x] Review HEP    [] Progressed/Changed HEP, detail:    [] Other detail:       Objective Information/Functional Measures/Assessment  Functional gains: greater standing walking tolerance ( 1-2 blocks), Decreased overall pain levels,   Functional deficits:   Pt reports to skilled therapy session with increased lower back pain with ADLs. Pt initiated gym PT with therapist providing skilled cues to promote proper sequencing and technique. Pt received new HEP exercises and verbalized understanding of exercise frequency and performance. Pt denies any increases in symptoms. Session tolerated well.      Patient

## 2023-10-04 ENCOUNTER — HOSPITAL ENCOUNTER (OUTPATIENT)
Facility: HOSPITAL | Age: 48
Setting detail: RECURRING SERIES
Discharge: HOME | End: 2023-10-07
Payer: MEDICARE

## 2023-10-04 PROCEDURE — 97530 THERAPEUTIC ACTIVITIES: CPT

## 2023-10-04 PROCEDURE — 97110 THERAPEUTIC EXERCISES: CPT

## 2023-10-04 NOTE — PROGRESS NOTES
PHYSICAL THERAPY - DISCHARGE DAILY NOTE AND SUMMARY (updated )    Patient Name: Saadia Drake : 1975   Treatment/Medical Diagnosis: Fall from chair, initial encounter [W07. XXXA]  Rib pain on left side [R07.81]  Lumbar pain on palpation [M54.50]   Referral Source: Elsa Izaguirre MD     Payor: Payor: Greta Vaughan / Plan: Greta Harden / Product Type: *No Product type* /            Reporting Period : 2023 to 10/4/2023    Date: 10/4/2023    Patient  verified yes     Visit #   Current / Total 6 10   Time   In / Out 12:30 1:11   Pain   In / Out 0/10 0/10   Subjective Functional Status/Changes: I'm feeling pretty good today. TREATMENT AREA =  Fall from chair, initial encounter [W07. XXXA]  Rib pain on left side [R07.81]  Lumbar pain on palpation [M54.50]    OBJECTIVE  Therapeutic Procedures: Tx Min Billable or 1:1 Min (if diff from Tx Min) Procedure, Rationale, Specifics   10 10 21119 Therapeutic Activity (timed):  use of dynamic activities replicating functional movements to increase ROM, strength, coordination, balance, and proprioception in order to improve patient's ability to progress to PLOF and address remaining functional goals. (see flow sheet as applicable)     Details if applicable:     31 22 19892 Therapeutic Exercise (timed):  increase ROM, strength, coordination, balance, and proprioception to improve patient's ability to progress to PLOF and address remaining functional goals.  (see flow sheet as applicable)     Details if applicable:                    39 41 [facility] Totals Reminder: bill using total billable min of TIMED therapeutic procedures (example: do not include dry needle or estim unattended, both untimed codes, in totals to left)  8-22 min = 1 unit; 23-37 min = 2 units; 38-52 min = 3 units; 53-67 min = 4 units; 68-82 min = 5 units   Total Total     [x]  Patient Education billed concurrently with other procedures   [x] Review HEP    [] Progressed/Changed HEP,

## 2023-10-04 NOTE — PROGRESS NOTES
Physical Therapy Discharge Instructions      [facility] - Eliane Borges  [address] [place] [address], [address] [facility]   [phone] fax      Patient: Carey Quintana  : 1975      Continue Home Exercise Program once  per day for 5 weeks, then decrease to 2-3 times per week      Continue with    [] Ice  as needed 2 times per day     [x] Heat           Follow up with MD:     [] Upon completion of therapy     [x] As needed      Recommendations:     [x]   Return to activity with home program    []   Return to activity with the following modifications:       []Post Rehab Program    []Join Independent aquatic program     [x]Return to/join local gym        Additional Comments: good luck!!!           Jo Suarez PTA 10/4/2023 1:04 PM

## 2023-10-11 ENCOUNTER — APPOINTMENT (OUTPATIENT)
Facility: HOSPITAL | Age: 48
End: 2023-10-11
Payer: MEDICARE

## 2025-01-13 ENCOUNTER — HOSPITAL ENCOUNTER (OUTPATIENT)
Facility: HOSPITAL | Age: 50
Setting detail: RECURRING SERIES
Discharge: HOME | End: 2025-01-16
Payer: MEDICARE

## 2025-01-13 PROCEDURE — 97162 PT EVAL MOD COMPLEX 30 MIN: CPT

## 2025-01-13 NOTE — PROGRESS NOTES
[facility] - [facility] AT [facility]  [address], [place] [address] Phone: [phone] Fax [phone]  Plan of Care / Statement of Necessity for Physical Therapy Services     Patient Name: Thee Basilio : 1975   Treatment   Diagnosis: M25.511  RIGHT SHOULDER PAIN and M54.2  NECK PAIN Medical Diagnosis: Cervicalgia [M54.2]   Onset Date: 2024 (referral) Payor Source: Payor: [employer] / Plan: [employer] CSNP / Product Type: *No Product type* /    Referral Source: Chad Ariza MD Start of Care (SOC): 2025   Prior Hospitalization: See medical history Provider #: [id number]   Prior Level of Function: Modified independent   Comorbidities: Asthma, HTN, hx of cancer, Anxiety, Depression, PTSD, Schizoaffective disorder, Diabetes, chronic LBP     Assessment / key information: Pt is a pleasant 49 y.o. female who presents to PT with severe headaches, neck pain, and left shoulder pain with sudden and unknown onset; headache describes to stay local to left posterior parietal lobe segment per pt demonstration. Pt demonstrates decreased ROM with all planes of cervical mobility, however may be limited due to pain. Pt reported severe tenderness with palpation to left left sternomastoid and suboccipitals - with increased headache symptoms after testing. Pt's impairments have limited their ability to perform all ADL without pain, feel unstable with ambulation, have a day without a headache. Pt would benefit from skilled PT to address above deficits in order to improve their ability to perfrom pain-free unrestricted ADLs/IADLs in order to enhance the Pt's overall quality of life.      Evaluation Complexity:  History:  HIGH Complexity :3+ comorbidities / personal factors will impact the outcome/ POC ; Examination:  MEDIUM Complexity : 3 Standardized tests and measures addressin body structure, function, activity limitation and / 
of care  []  Update interventions per flow sheet       []  Other:_      Na Morales, PT 1/13/2025  3:40 PM     If an interpreting service was utilized for treatment of this patient, the contents of this document represent the material reviewed with the patient via the .

## 2025-01-28 ENCOUNTER — HOSPITAL ENCOUNTER (OUTPATIENT)
Facility: HOSPITAL | Age: 50
Setting detail: RECURRING SERIES
Discharge: HOME | End: 2025-01-31
Payer: MEDICARE

## 2025-01-28 PROCEDURE — 97112 NEUROMUSCULAR REEDUCATION: CPT

## 2025-01-28 PROCEDURE — 97110 THERAPEUTIC EXERCISES: CPT

## 2025-01-30 ENCOUNTER — APPOINTMENT (OUTPATIENT)
Facility: HOSPITAL | Age: 50
End: 2025-01-30
Payer: MEDICARE

## 2025-02-04 ENCOUNTER — HOSPITAL ENCOUNTER (OUTPATIENT)
Facility: HOSPITAL | Age: 50
Setting detail: RECURRING SERIES
Discharge: HOME | End: 2025-02-07
Payer: MEDICARE

## 2025-02-04 PROCEDURE — 97530 THERAPEUTIC ACTIVITIES: CPT

## 2025-02-04 PROCEDURE — 97110 THERAPEUTIC EXERCISES: CPT

## 2025-02-04 NOTE — PROGRESS NOTES
PHYSICAL / OCCUPATIONAL THERAPY - DAILY TREATMENT NOTE    Patient Name: Thee Basilio    Date: 2025    : 1975  Insurance: Payor: [employer] / Plan: [employer] ENGAGE HMO CSNP / Product Type: *No Product type* /      Patient  verified Yes     Visit #   Current / Total 3 24   Time   In / Out 12:25 1:05   Pain   In / Out 5 3   Subjective Functional Status/Changes: Tight tight tight!!!!  Can't get comfortable to sleep     TREATMENT AREA =  Cervicalgia [M54.2]     OBJECTIVE    Modalities Rationale:     decrease pain to improve patient's ability to progress to PLOF and address remaining functional goals.     min [] Estim Unattended, type/location:                                      []  w/ice    []  w/heat    min [] Estim Attended, type/location:                                     []  w/US     []  w/ice    []  w/heat    []  TENS insruct      min []  Mechanical Traction: type/lbs                   []  pro   []  sup   []  int   []  cont    []  before manual    []  after manual    min []  Ultrasound, settings/location:     5 min  unbill []  Ice     [x]  Heat    location/position: neck, sitting    min []  Paraffin,  details:     min []  Vasopneumatic Device, press/temp:     min []  Whirlpool / Fluido:    If using vaso (only need to measure limb vaso being performed on)      pre-treatment girth :       post-treatment girth :       measured at (landmark location) :      min []  Other:    Skin assessment post-treatment:   Intact      Therapeutic Procedures:    Tx Min Billable or 1:1 Min (if diff from Tx Min) Procedure, Rationale, Specifics   20  [id number] Therapeutic Exercise (timed):  increase ROM, strength, coordination, balance, and proprioception to improve patient's ability to progress to PLOF and address remaining functional goals. (see flow sheet as applicable)     Details if applicable:       15 15 [id number] Therapeutic Activity (timed):  use of dynamic activities replicating functional movements

## 2025-02-06 ENCOUNTER — HOSPITAL ENCOUNTER (OUTPATIENT)
Facility: HOSPITAL | Age: 50
Setting detail: RECURRING SERIES
Discharge: HOME | End: 2025-02-09
Payer: MEDICARE

## 2025-02-06 PROCEDURE — 97110 THERAPEUTIC EXERCISES: CPT

## 2025-02-06 PROCEDURE — 97032 APPL MODALITY 1+ESTIM EA 15: CPT

## 2025-02-06 NOTE — PROGRESS NOTES
cervical flexion/extension to 35 degrees or better to demonstrate improved mobility required for ADL.   Status at last note/certification:                  Flexion 15   Extension 20      Long Term Goals: To be accomplished in 18 treatments   Pt to improve cervical flexion/extension to 60/45 degrees or better to demonstrate improved mobility required for ADL.   Status at last note/certification:                  Flexion 15   Extension 20      2.  Pt to improve bilateral lateral side bending to 45 degrees without reporting pain to demonstrate improved muscle length and mobility required for ADL.   Status at last note/certification:                  Right Left   Side Bend 25 p!  20   Current: status quo with fear to go further into range (2/6/2025)  3.  Pt to report 60% improvement in headache symptoms to demonstrate improved quality of life.   Status at last note/certification: pt reports constant headaches that last all day and into the night affecting sleep    notmet:  HA's persista ndinterrpupt sleep  92/4/2025_  4.  Pt to improve NDI score to 36% or better to demonstrate improved functional mobility and quality of life.   Status at last note/certification:  NDI: 56%      Next PN/ RC due 2/11/25   Auth due (visit number/ date) 24 visits, expires 4/12/25    PLAN  - Continue Plan of Care  - Upgrade activities as tolerated    Na Morales, PT    2/6/2025    2:31 PM  If an interpreting service was utilized for treatment of this patient, the contents of this document represent the material reviewed with the patient via the .     Future Appointments   Date Time Provider Department Center   2/11/2025  2:00 PM [id number]

## 2025-02-11 ENCOUNTER — HOSPITAL ENCOUNTER (OUTPATIENT)
Facility: HOSPITAL | Age: 50
Setting detail: RECURRING SERIES
Discharge: HOME | End: 2025-02-14
Payer: MEDICARE

## 2025-02-11 PROCEDURE — 97032 APPL MODALITY 1+ESTIM EA 15: CPT

## 2025-02-11 PROCEDURE — 97110 THERAPEUTIC EXERCISES: CPT

## 2025-02-11 PROCEDURE — 97530 THERAPEUTIC ACTIVITIES: CPT

## 2025-02-11 NOTE — PROGRESS NOTES
PHYSICAL / OCCUPATIONAL THERAPY - DAILY TREATMENT NOTE    Patient Name: Thee Basilio    Date: 2025    : 1975  Insurance: Payor: [employer] / Plan: [place] [employer] ENGAGE [employer]MO CSNP / Product Type: *No Product type* /      Patient  verified Yes     Visit #   Current / Total 5 24   Time   In / Out 2:05 3:00   Pain   In / Out 5 3.5   Subjective Functional Status/Changes: I woke up with a \"crick\" in my neck.       TREATMENT AREA =  Cervicalgia [M54.2]     OBJECTIVE    Modalities Rationale:     decrease pain to improve patient's ability to progress to PLOF and address remaining functional goals.     min [] Estim Unattended, type/location:                                      []  w/ice    []  w/heat   10 + 3 set-up min [x] Estim Attended, type/location: US/Stim combo left UT    US 50%                                     []  w/US     []  w/ice    []  w/heat    []  TENS insruct      min []  Mechanical Traction: type/lbs                   []  pro   []  sup   []  int   []  cont    []  before manual    []  after manual    min []  Ultrasound, settings/location:     10 min  unbill []  Ice     [x]  Heat    location/position: Sitting, right UT    min []  Paraffin,  details:     min []  Vasopneumatic Device, press/temp:     min []  Whirlpool / Fluido:    If using vaso (only need to measure limb vaso being performed on)      pre-treatment girth :       post-treatment girth :       measured at (landmark location) :      min []  Other:    Skin assessment post-treatment:   Intact      Therapeutic Procedures:    Tx Min Billable or 1:1 Min (if diff from Tx Min) Procedure, Rationale, Specifics   35 30 23027 Therapeutic Activity (timed):  use of dynamic activities replicating functional movements to increase ROM, strength, coordination, balance, and proprioception in order to improve patient's ability to progress to PLOF and address remaining functional goals.  (see flow sheet as applicable)     Details if 
increased pain impacting ROM     Right Left   Side Bend 22 p!  15 p!      3.  Pt to report 60% improvement in headache symptoms to demonstrate improved quality of life.   Status at last note/certification:  50% reduction in HA's.  Sleep has improved sleep more recently.  Still has episodes of \"kink\" in her neck at times that can awaken her, but less frequent  (2/1/2025)    4.  Pt to improve NDI score to 36% or better to demonstrate improved functional mobility and quality of life.   Status at last note/certification:   NDI 54%  (2/11/2025)    RECOMMENDATIONS  Patient would benefit from the continuation of skilled rehab interventions, per initial Plan of Care or most recent Medicare Recert, for functional progress to achieving above stated clinically significant goals.    If you have any questions/comments please contact us directly.  Thank you for allowing us to assist in the care of your patient.    Merari Velez PTA       2/11/2025       2:51 PM     Therapist Signature: Merari Velez PTA Date: 2/11/2025 Time:  2:51 PM

## 2025-02-19 ENCOUNTER — APPOINTMENT (OUTPATIENT)
Facility: HOSPITAL | Age: 50
End: 2025-02-19
Payer: MEDICARE

## 2025-02-21 ENCOUNTER — HOSPITAL ENCOUNTER (OUTPATIENT)
Facility: HOSPITAL | Age: 50
Setting detail: RECURRING SERIES
Discharge: HOME | End: 2025-02-24
Payer: MEDICARE

## 2025-02-21 PROCEDURE — 97112 NEUROMUSCULAR REEDUCATION: CPT

## 2025-02-21 PROCEDURE — 97140 MANUAL THERAPY 1/> REGIONS: CPT

## 2025-02-21 PROCEDURE — 97110 THERAPEUTIC EXERCISES: CPT

## 2025-02-21 NOTE — PROGRESS NOTES
PHYSICAL / OCCUPATIONAL THERAPY - DAILY TREATMENT NOTE    Patient Name: Thee Basilio    Date: 2025    : 1975  Insurance: Payor: [employer]ICARE / Plan: [place] [employer] ENGAGE HMO CSNP / Product Type: *No Product type* /      Patient  verified Yes     Visit #   Current / Total 1 18   Time   In / Out 1:44 2:34   Pain   In / Out 5 - stiff 3   Subjective Functional Status/Changes: Pt reports increased pain and \"catching\" in her neck when looking side to side.     TREATMENT AREA =  Cervicalgia [M54.2]     OBJECTIVE    Modalities Rationale:     decrease pain and increase tissue extensibility to improve patient's ability to progress to PLOF and address remaining functional goals.    8 min  unbill []  Ice     [x]  Heat    location/position: Neck and shoulders/ sitting   Skin assessment post-treatment:   Redness, no adverse reactions       Therapeutic Procedures:    Tx Min Billable or 1:1 Min (if diff from Tx Min) Procedure, Rationale, Specifics   19 16 43718 Therapeutic Exercise (timed):  increase ROM, strength, coordination, balance, and proprioception to improve patient's ability to progress to PLOF and address remaining functional goals. (see flow sheet as applicable)     Details if applicable:       13 13 90848 Neuromuscular Re-Education (timed):  improve balance, coordination, kinesthetic sense, posture, core stability and proprioception to improve patient's ability to develop conscious control of individual muscles and awareness of position of extremities in order to progress to PLOF and address remaining functional goals. (see flow sheet as applicable)     Details if applicable:     10 10 72459 Manual Therapy (timed):  decrease pain, increase ROM, and increase tissue extensibility to improve patient's ability to progress to PLOF and address remaining functional goals.  The manual therapy interventions were performed at a separate and distinct time from the therapeutic activities interventions .

## 2025-02-26 ENCOUNTER — HOSPITAL ENCOUNTER (OUTPATIENT)
Facility: HOSPITAL | Age: 50
Setting detail: RECURRING SERIES
Discharge: HOME | End: 2025-03-01
Payer: MEDICARE

## 2025-02-26 PROCEDURE — 97110 THERAPEUTIC EXERCISES: CPT

## 2025-02-26 PROCEDURE — 97140 MANUAL THERAPY 1/> REGIONS: CPT

## 2025-02-26 NOTE — PROGRESS NOTES
PHYSICAL / OCCUPATIONAL THERAPY - DAILY TREATMENT NOTE    Patient Name: Thee Basilio    Date: 2025    : 1975  Insurance: Payor: [employer] / Plan: [employer] ENGAGE HMO CSNP / Product Type: *No Product type* /      Patient  verified Yes     Visit #   Current / Total 2 18   Time   In / Out 12:28 1:10   Pain   In / Out 6 2   Subjective Functional Status/Changes: Today the HA is on the left but it switches sides from time to time     TREATMENT AREA =  Cervicalgia [M54.2]     OBJECTIVE    Modalities Rationale:     decrease pain and increase tissue extensibility to improve patient's ability to progress to PLOF and address remaining functional goals.     min [] Estim Unattended, type/location:                                      []  w/ice    []  w/heat    min [] Estim Attended, type/location:                                     []  w/US     []  w/ice    []  w/heat    []  TENS insruct      min []  Mechanical Traction: type/lbs                   []  pro   []  sup   []  int   []  cont    []  before manual    []  after manual    min []  Ultrasound, settings/location:     8 min  unbill []  Ice     [x]  Heat    location/position: Sitting, neck    min []  Paraffin,  details:     min []  Vasopneumatic Device, press/temp:     min []  Whirlpool / Fluido:    If using vaso (only need to measure limb vaso being performed on)      pre-treatment girth :       post-treatment girth :       measured at (landmark location) :      min []  Other:    Skin assessment post-treatment:   Intact      Therapeutic Procedures:    Tx Min Billable or 1:1 Min (if diff from Tx Min) Procedure, Rationale, Specifics   18 18 [id number] Manual Therapy (timed):  decrease pain, increase ROM, and increase tissue extensibility to improve patient's ability to progress to PLOF and address remaining functional goals.  The manual therapy interventions were performed at a separate and distinct time from the therapeutic activities

## 2025-05-23 NOTE — PROGRESS NOTES
Instructions for your procedure at [facility]      Today's Date: 5/23/2025      Patient's Name: Thee Basilio      Procedure Date: May 29, 2025        Please enter the main entrance of the hospital and check-in at the  located in the lobby.      Do NOT eat or drink anything, including candy, gum, or ice chips after midnight prior to your procedure, unless it is part of your prep.  Brush your teeth before coming to the hospital.You may swish with water, but do not swallow.  No smoking/Vaping/E-Cigarettes 24 hours prior to the day of procedure.  No alcohol 24 hours prior to the day of procedure.  No recreational drugs for one week prior to the day of procedure.  Bring Photo ID, Insurance information, and Co-pay if required on day of procedure.  Bring in pertinent legal documents, such as, Medical Power of , DNR, Advance Directive, etc.  Leave all other valuables, including money/purse, weapons at home.  Remove jewelry, including ALL body piercings, nail polish, acrylic nails, and makeup (including mascara); no lotions, powders, deodorant, and/or perfume/cologne/after shave on the skin.  Glasses and dentures may be worn to the hospital.  They must be removed prior to procedure. Please bring case/container for glasses or dentures.  11. Contacts should not be worn on day of procedure.   12. Call the office ([phone]) if you have symptoms of a cold or illness within 24-48 hours prior to your procedure.   13. AN ADULT (relative or friend 18 years or older) MUST DRIVE YOU HOME AFTER YOUR PROCEDURE.   14. Please make arrangements for a responsible adult (18 years or older) to be with you for 24 hours after your procedure.   15. TWO VISITORS will be allowed in the waiting area during your procedure.       Special Instructions:      Bring list of CURRENT medications.  Follow instructions from the office regarding Bowel Prep, Vitamins, Iron, Blood Thinners, Insulin, Seizure,

## 2025-05-28 ENCOUNTER — ANESTHESIA EVENT (OUTPATIENT)
Facility: HOSPITAL | Age: 50
End: 2025-05-28
Payer: MEDICARE

## 2025-05-29 ENCOUNTER — HOSPITAL ENCOUNTER (OUTPATIENT)
Facility: HOSPITAL | Age: 50
Setting detail: OUTPATIENT SURGERY
Discharge: HOME | End: 2025-05-29
Attending: INTERNAL MEDICINE | Admitting: INTERNAL MEDICINE
Payer: MEDICARE

## 2025-05-29 ENCOUNTER — ANESTHESIA (OUTPATIENT)
Facility: HOSPITAL | Age: 50
End: 2025-05-29
Payer: MEDICARE

## 2025-05-29 VITALS
HEART RATE: 72 BPM | WEIGHT: 218 LBS | TEMPERATURE: 98 F | HEIGHT: 60 IN | DIASTOLIC BLOOD PRESSURE: 72 MMHG | RESPIRATION RATE: 16 BRPM | SYSTOLIC BLOOD PRESSURE: 106 MMHG | BODY MASS INDEX: 42.8 KG/M2 | OXYGEN SATURATION: 100 %

## 2025-05-29 PROCEDURE — 7100000000 HC PACU RECOVERY - FIRST 15 MIN: Performed by: INTERNAL MEDICINE

## 2025-05-29 PROCEDURE — 6360000002 HC RX W HCPCS: Performed by: ANESTHESIOLOGY

## 2025-05-29 PROCEDURE — 7100000010 HC PHASE II RECOVERY - FIRST 15 MIN: Performed by: INTERNAL MEDICINE

## 2025-05-29 PROCEDURE — 3600007502: Performed by: INTERNAL MEDICINE

## 2025-05-29 PROCEDURE — 2709999900 HC NON-CHARGEABLE SUPPLY: Performed by: INTERNAL MEDICINE

## 2025-05-29 PROCEDURE — 2580000003 HC RX 258: Performed by: NURSE ANESTHETIST, CERTIFIED REGISTERED

## 2025-05-29 PROCEDURE — 3700000000 HC ANESTHESIA ATTENDED CARE: Performed by: INTERNAL MEDICINE

## 2025-05-29 PROCEDURE — 88305 TISSUE EXAM BY PATHOLOGIST: CPT

## 2025-05-29 PROCEDURE — 7100000011 HC PHASE II RECOVERY - ADDTL 15 MIN: Performed by: INTERNAL MEDICINE

## 2025-05-29 RX ORDER — FENTANYL CITRATE 50 UG/ML
INJECTION, SOLUTION INTRAMUSCULAR; INTRAVENOUS
Status: DISCONTINUED | OUTPATIENT
Start: 2025-05-29 | End: 2025-05-29 | Stop reason: SDUPTHER

## 2025-05-29 RX ORDER — PROPOFOL 10 MG/ML
INJECTION, EMULSION INTRAVENOUS
Status: DISCONTINUED | OUTPATIENT
Start: 2025-05-29 | End: 2025-05-29 | Stop reason: SDUPTHER

## 2025-05-29 RX ORDER — SODIUM CHLORIDE 0.9 % (FLUSH) 0.9 %
5-40 SYRINGE (ML) INJECTION PRN
Status: DISCONTINUED | OUTPATIENT
Start: 2025-05-29 | End: 2025-05-29 | Stop reason: HOSPADM

## 2025-05-29 RX ORDER — GLYCOPYRROLATE 0.2 MG/ML
INJECTION INTRAMUSCULAR; INTRAVENOUS
Status: DISCONTINUED | OUTPATIENT
Start: 2025-05-29 | End: 2025-05-29 | Stop reason: SDUPTHER

## 2025-05-29 RX ORDER — LIDOCAINE HYDROCHLORIDE 10 MG/ML
1 INJECTION, SOLUTION EPIDURAL; INFILTRATION; INTRACAUDAL; PERINEURAL
Status: DISCONTINUED | OUTPATIENT
Start: 2025-05-29 | End: 2025-05-29 | Stop reason: HOSPADM

## 2025-05-29 RX ORDER — SODIUM CHLORIDE, SODIUM LACTATE, POTASSIUM CHLORIDE, CALCIUM CHLORIDE 600; 310; 30; 20 MG/100ML; MG/100ML; MG/100ML; MG/100ML
INJECTION, SOLUTION INTRAVENOUS CONTINUOUS
Status: DISCONTINUED | OUTPATIENT
Start: 2025-05-29 | End: 2025-05-29 | Stop reason: HOSPADM

## 2025-05-29 RX ADMIN — PROPOFOL 50 MG: 10 INJECTION, EMULSION INTRAVENOUS at 09:11

## 2025-05-29 RX ADMIN — SODIUM CHLORIDE, SODIUM LACTATE, POTASSIUM CHLORIDE, AND CALCIUM CHLORIDE: .6; .31; .03; .02 INJECTION, SOLUTION INTRAVENOUS at 09:00

## 2025-05-29 RX ADMIN — FENTANYL CITRATE 50 MCG: 50 INJECTION INTRAMUSCULAR; INTRAVENOUS at 09:11

## 2025-05-29 RX ADMIN — PROPOFOL 50 MG: 10 INJECTION, EMULSION INTRAVENOUS at 09:10

## 2025-05-29 RX ADMIN — GLYCOPYRROLATE 0.2 MG: 0.2 INJECTION INTRAMUSCULAR; INTRAVENOUS at 09:07

## 2025-05-29 RX ADMIN — FENTANYL CITRATE 50 MCG: 50 INJECTION INTRAMUSCULAR; INTRAVENOUS at 09:07

## 2025-05-29 ASSESSMENT — PAIN - FUNCTIONAL ASSESSMENT
PAIN_FUNCTIONAL_ASSESSMENT: NONE - DENIES PAIN
PAIN_FUNCTIONAL_ASSESSMENT: 0-10

## 2025-05-29 NOTE — ANESTHESIA POSTPROCEDURE EVALUATION
Department of Anesthesiology  Postprocedure Note    Patient: Thee Basilio  MRN: [id number]  YOB: 1975  Date of evaluation: 5/29/2025    Procedure Summary       Date: 05/29/25 Room / Location: [facility] ENDO 02 / [facility] ENDOSCOPY    Anesthesia Start: 0905 Anesthesia Stop: 0919    Procedure: ESOPHAGOGASTRODUODENOSCOPY w/ BX and 48F Dilation (Upper GI Region) Diagnosis:       Dysphagia, unspecified type      Chronic constipation      Gastroesophageal reflux disease, unspecified whether esophagitis present      Colon cancer screening      (Dysphagia, unspecified type [R13.10])      (Chronic constipation [K59.09])      (Gastroesophageal reflux disease, unspecified whether esophagitis present [K21.9])      (Colon cancer screening [Z12.11])    Surgeons: Quoc Arellano MD Responsible Provider: Brian Hoffman MD    Anesthesia Type: MAC ASA Status: 3            Anesthesia Type: MAC    Saúl Phase I: Saúl Score: 9    Saúl Phase II:      Anesthesia Post Evaluation    Patient location during evaluation: PACU  Patient participation: complete - patient participated  Level of consciousness: awake  Pain score: 0  Airway patency: patent  Nausea & Vomiting: no nausea and no vomiting  Cardiovascular status: hemodynamically stable  Respiratory status: acceptable  Hydration status: euvolemic  Pain management: adequate    No notable events documented.

## 2025-05-29 NOTE — ANESTHESIA PRE PROCEDURE
(220 lb) 98.9 kg (218 lb)   Height: 1.524 m (5') 1.524 m (5')                                              BP Readings from Last 3 Encounters:   05/29/25 128/83   06/22/23 136/86   06/20/23 130/76       NPO Status: Time of last liquid consumption: 2330                        Time of last solid consumption: 2330                        Date of last liquid consumption: 05/28/25                        Date of last solid food consumption: 05/28/25    BMI:   Wt Readings from Last 3 Encounters:   05/29/25 98.9 kg (218 lb)   06/22/23 108.9 kg (240 lb)   06/20/23 104.3 kg (230 lb)     Body mass index is 42.58 kg/m².    CBC:   Lab Results   Component Value Date/Time    WBC 4.6 12/17/2019 02:05 PM    RBC 4.30 12/17/2019 02:05 PM    HGB 12.4 12/17/2019 02:05 PM    HCT 36.5 12/17/2019 02:05 PM    MCV 84.9 12/17/2019 02:05 PM    RDW 13.6 12/17/2019 02:05 PM     12/17/2019 02:05 PM       CMP:   Lab Results   Component Value Date/Time     07/22/2021 04:02 PM    K 3.5 07/22/2021 04:02 PM    CL 98 07/22/2021 04:02 PM    CO2 29 07/22/2021 04:02 PM    BUN 13 07/22/2021 04:02 PM    CREATININE 0.8 07/22/2021 04:02 PM    GFRAA >60.0 07/22/2021 04:02 PM    AGRATIO 1.6 07/22/2021 04:02 PM    LABGLOM >60.0 07/22/2021 04:02 PM    GLUCOSE 86 07/22/2021 04:02 PM    CALCIUM 9.3 07/22/2021 04:02 PM    BILITOT 0.6 07/22/2021 04:02 PM    ALKPHOS 72 07/22/2021 04:02 PM    AST 17 07/22/2021 04:02 PM    ALT 19 07/22/2021 04:02 PM       POC Tests: No results for input(s): \"POCGLU\", \"POCNA\", \"POCK\", \"POCCL\", \"POCBUN\", \"POCHEMO\", \"POCHCT\" in the last 72 hours.    Coags: No results found for: \"PROTIME\", \"INR\", \"APTT\"    HCG (If Applicable):   Lab Results   Component Value Date    PREGTESTUR NEGATIVE 12/17/2019        ABGs: No results found for: \"PHART\", \"PO2ART\", \"YYZ5UYL\", \"ODM7JZM\", \"BEART\", \"W6TZAOBO\"     Type & Screen (If Applicable):  No results found for: \"ABORH\", \"LABANTI\"    Drug/Infectious Status (If Applicable):  No results found

## 2025-05-29 NOTE — H&P
GASTROENTEROLOGY Pre-Procedure H and P      Impression/Plan:   1. This patient is consented for an EGD for dysphagia.      Chief Complaint: dysphagia.      HPI:  Thee Basilio is a 50 y.o. female who presents for an EGD for evaluation of dysphagia.      PMH:   Past Medical History:   Diagnosis Date    Anxiety     Asthma     Bronchitis     Depression     pt states anxiety schizoaffective, ptsd,adhd,mixed receptive language disorder    Eye twitch     Hypertension     Kidney carcinoma, left (HCC) 2012    Nephrectomy    Lymphedema     Psychotic affective disorder     attention deficit disorder, and PTSD    Psychotic disorder (HCC)     Schizoaffective disorder       PSH:   Past Surgical History:   Procedure Laterality Date    COLONOSCOPY N/A 2021    COLONOSCOPY performed by Quoc Arellano MD at [facility] ENDOSCOPY    COLONOSCOPY      HYSTERECTOMY (CERVIX STATUS UNKNOWN)      KIDNEY REMOVAL Left     OVARY REMOVAL Right     UPPER GASTROINTESTINAL ENDOSCOPY         Social HX:   Social History     Socioeconomic History    Marital status: Single     Spouse name: Not on file    Number of children: Not on file    Years of education: Not on file    Highest education level: Not on file   Occupational History    Not on file   Tobacco Use    Smoking status: Former     Current packs/day: 0.00     Types: Cigarettes     Quit date: 2011     Years since quittin.5    Smokeless tobacco: Never   Vaping Use    Vaping status: Never Used   Substance and Sexual Activity    Alcohol use: Not Currently    Drug use: Never    Sexual activity: Not on file   Other Topics Concern    Not on file   Social History Narrative         ** Merged History Encounter **     Social Drivers of Health     Financial Resource Strain: Not on file   Food Insecurity: Not on file   Transportation Needs: Not on file   Physical Activity: Not on file   Stress: Not on file   Social Connections: Not on file   Intimate Partner Violence: Not on file

## (undated) DEVICE — FCPS RAD JAW 4LC 240CM W/NDL -- BX/20 RADIAL JAW 4

## (undated) DEVICE — BITE BLOCK ENDOSCP UNIV AD 6 TO 9.4 MM

## (undated) DEVICE — BASIN EMSIS 16OZ GRAPHITE PLAS KID SHP MOLD GRAD FOR ORAL

## (undated) DEVICE — SYRINGE MED 25GA 3ML L5/8IN SUBQ PLAS W/ DETACH NDL SFTY

## (undated) DEVICE — ENDOSCOPY PUMP TUBING/ CAP SET: Brand: ERBE

## (undated) DEVICE — CATHETER SUCT TR FL TIP 14FR W/ O CTRL

## (undated) DEVICE — FORCEPS BX L240CM JAW DIA2.4MM ORNG L CAP W/ NDL DISP RAD

## (undated) DEVICE — SYR 50ML SLIP TIP NSAF LF STRL --

## (undated) DEVICE — STERILE POLYISOPRENE POWDER-FREE SURGICAL GLOVES: Brand: PROTEXIS

## (undated) DEVICE — SOLUTION IRRIG 1000ML H2O STRL BLT

## (undated) DEVICE — GOWN ISOL IMPERV UNIV, DISP, OPEN BACK, BLUE --

## (undated) DEVICE — FLUFF AND POLYMER UNDERPAD,EXTRA HEAVY: Brand: WINGS

## (undated) DEVICE — FLEX ADVANTAGE 3000CC: Brand: FLEX ADVANTAGE

## (undated) DEVICE — MEDI-VAC NON-CONDUCTIVE SUCTION TUBING: Brand: CARDINAL HEALTH

## (undated) DEVICE — SYR 20ML LL STRL LF --

## (undated) DEVICE — BASIN EMESIS 500CC ROSE 250/CS 60/PLT: Brand: MEDEGEN MEDICAL PRODUCTS, LLC

## (undated) DEVICE — GAUZE,SPONGE,4"X4",16PLY,STRL,LF,10/TRAY: Brand: MEDLINE

## (undated) DEVICE — MEDI-VAC SUCTION HIGH CAPACITY: Brand: CARDINAL HEALTH

## (undated) DEVICE — UNDERPAD INCONT W23XL36IN STD BLU POLYPR BK FLUF SFT

## (undated) DEVICE — CANNULA NSL AD TBNG L14FT STD PVC O2 CRV CONN NONFLARED NSL

## (undated) DEVICE — LINER SUCT CANSTR 3000CC PLAS SFT PRE ASSEMB W/OUT TBNG W/

## (undated) DEVICE — SYRINGE MED 50ML LUERSLIP TIP

## (undated) DEVICE — YANKAUER,SMOOTH HANDLE,HIGH CAPACITY: Brand: MEDLINE INDUSTRIES, INC.

## (undated) DEVICE — SYR 10ML LUER LOK 1/5ML GRAD --

## (undated) DEVICE — CANNULA ORIG TL CLR W FOAM CUSHIONS AND 14FT SUPL TB 3 CHN

## (undated) DEVICE — AIRLIFE™ NASAL OXYGEN CANNULA CURVED, FLARED TIP WITH 14 FOOT (4.3 M) CRUSH-RESISTANT TUBING, OVER-THE-EAR STYLE: Brand: AIRLIFE™